# Patient Record
Sex: MALE | Race: WHITE | NOT HISPANIC OR LATINO | Employment: OTHER | URBAN - METROPOLITAN AREA
[De-identification: names, ages, dates, MRNs, and addresses within clinical notes are randomized per-mention and may not be internally consistent; named-entity substitution may affect disease eponyms.]

---

## 2017-02-06 ENCOUNTER — GENERIC CONVERSION - ENCOUNTER (OUTPATIENT)
Dept: OTHER | Facility: OTHER | Age: 72
End: 2017-02-06

## 2017-03-13 ENCOUNTER — GENERIC CONVERSION - ENCOUNTER (OUTPATIENT)
Dept: OTHER | Facility: OTHER | Age: 72
End: 2017-03-13

## 2018-01-05 ENCOUNTER — ALLSCRIPTS OFFICE VISIT (OUTPATIENT)
Dept: OTHER | Facility: OTHER | Age: 73
End: 2018-01-05

## 2018-01-05 DIAGNOSIS — Z12.5 ENCOUNTER FOR SCREENING FOR MALIGNANT NEOPLASM OF PROSTATE: ICD-10-CM

## 2018-01-05 DIAGNOSIS — I25.10 ATHEROSCLEROTIC HEART DISEASE OF NATIVE CORONARY ARTERY WITHOUT ANGINA PECTORIS: ICD-10-CM

## 2018-01-05 DIAGNOSIS — E78.5 HYPERLIPIDEMIA: ICD-10-CM

## 2018-01-06 NOTE — PROGRESS NOTES
Assessment   1  Current every day smoker (305 1) (F17 200)  2  Tobacco use (305 1) (Z72 0)  3  ASCVD (arteriosclerotic cardiovascular disease) (429 2,440 9) (I25 10)  4  Inguinal hernia, right (550 90) (K40 90)  5  Special screening examination for neoplasm of prostate (V76 44) (Z12 5)  6  Hyperlipidemia (272 4) (E78 5)  7  Osteopenia (733 90) (M85 80)  8  At risk for bone density loss (V49 89) (Z91 89)  9  Peripheral arterial disease (443 9) (I73 9)    Plan   ASCVD (arteriosclerotic cardiovascular disease)    · 1 - Aliya Richardson DO  Cardiology Co-Management  *  Status: Active  Requested for:    92MFA7132  () Care Summary provided  : Yes   · (1) CBC/PLT/DIFF; Status:Active; Requested ORJ:[id number]; Health Maintenance    · Stop: Clopidogrel Bisulfate 75 MG Oral Tablet (Plavix)  Hyperlipidemia    · (1) COMPREHENSIVE METABOLIC PANEL; Status:Active; Requested FUB:[id number];    · (1) LIPID PANEL, FASTING; Status:Active; Requested QYT:[id number]; Inguinal hernia, right    · 1 Sheryl Saunders MD, Mamadou Boothe  (General Surgery) Co-Management  *  Status: Active  Requested    for: 87BZQ4179  () Care Summary provided  : Yes  SocHx: Tobacco use    · We recommend you quit smoking  Time spent counseling today was greater than 3    minutes ; Status:Complete - Retrospective By Protocol Authorization;   Done: 99FEV1388  Special screening examination for neoplasm of prostate    · (1) PSA (SCREEN) (Dx V76 44 Screen for Prostate Cancer); Status:Active; Requested    PQX:[id number]; Discussion/Summary      Patient presents today for an inguinal bulge which appears to be a right inguinal hernia and I am going to refer him to surgery  Fortunately, he is not having any pain and this can be done electively  am going to send him to Dr Rachel Ayers for his history of coronary disease as he has had multiple different cardiologist at his current group  is due for some blood work which was ordered  This should be done in a fasting state      he has a history of osteopenia and I will consider repeating a DEXA scan this summer  I will have him follow up for a Medicare wellness visit in the next 4 months or so  regards to his peripheral arterial disease, I did encourage him to quit smoking  Fortunately he is not having any significant claudication at this time and I would encourage ongoing exercise  Chief Complaint   c/o lump on lower right quadrant with Flu shot      History of Present Illness   HPI: Patient presents today for chief complaint of a bulge in his right groin  This has been present for at least 2-3 weeks  He has a history of left hernia repair  exertional chest pain or shortness of breath  He does have known peripheral arterial disease but is having no current claudication  Unfortunately, he does continue to smoke but has cut back to 8 cigarettes per day  Review of Systems        Constitutional: no fever,-- not feeling poorly,-- no chills-- and-- not feeling tired  Cardiovascular: the heart rate was not slow,-- no chest pain,-- the heart rate was not fast-- and-- no palpitations  Respiratory: no shortness of breath,-- no cough,-- no wheezing-- and-- no shortness of breath during exertion  Gastrointestinal: no abdominal pain,-- no nausea,-- no vomiting,-- no constipation-- and-- no diarrhea  Genitourinary: urinary hesitancy, but-- no dysuria-- and-- no nocturia  Active Problems   1  ASCVD (arteriosclerotic cardiovascular disease) (429 2,440 9) (I25 10)  2  At risk for bone density loss (V49 89) (Z91 89)  3  Current every day smoker (305 1) (F17 200)  4  Hearing loss (389 9) (H91 90)  5  Heme + stool (792 1) (R19 5)  6  Hyperlipidemia (272 4) (E78 5)  7  Lower back pain (724 2) (M54 5)  8  Medicare annual wellness visit, initial (V70 0) (Z00 00)  9  Myalgia (729 1) (M79 1)  10  Osteopenia (733 90) (M85 80)  11   Polyp of sigmoid colon (211 3) (D12 5)  12  Special screening examination for neoplasm of prostate (V76 44) (Z12 5)  13  Tobacco use (305 1) (Z72 0)    Past Medical History   1  History of Acute URI (465 9) (J06 9)  2  History of Ecchymoses, spontaneous (782 7) (R23 3)  3  History of acute bronchitis (V12 69) (Z87 09)  4  History of hypertension (V12 59) (Z86 79)  5  History of intermittent claudication (V12 50) (Z86 79)  6  History of Influenza vaccination administered during current admission (V04 81) (Z23)  7  History of Need for pneumococcal vaccination (V03 82) (Z23)  8  History of Trochanteric bursitis, unspecified laterality (726 5) (M70 60)  Active Problems And Past Medical History Reviewed: The active problems and past medical history were reviewed and updated today  Family History   Mother   1  Family history of Diabetes Mellitus (V18 0)  Father   2  Family history of Reported Previous Cardiac Problems  Sister   3  Family history of COPD, severe  Family History Reviewed: The family history was reviewed and updated today  Social History    · Being A Social Drinker   · Current every day smoker (305 1) (F17 200)   · Tobacco use (305 1) (Z72 0)  The social history was reviewed and updated today  Surgical History   1  History of Cardiac Cath Procedure Outcome:  2  History of Hernia Repair  Surgical History Reviewed: The surgical history was reviewed and updated today  Current Meds   1  Atorvastatin Calcium 80 MG Oral Tablet; take one tablet by mouth one time daily; Therapy: 16Hmb6252 to (Evaluate:12Nov2017)  Requested for: 65CEZ2388; Last     Rx:[id number] Ordered  2  Baby Aspirin 81 MG CHEW; CHEW AND SWALLOW 1 TABLET DAILY; Therapy: (Recorded:46Rxe7709) to Recorded  3  Calcium TABS; Take 1 tablet daily; Therapy: (Recorded:86Zzg3467) to Recorded  4  Clopidogrel Bisulfate 75 MG Oral Tablet; Take 1 tablet daily  Requested for: 94Jzl9842; Last Rx:11Sim9810 Ordered  5   Metoprolol Succinate  MG Oral Tablet Extended Release 24 Hour; take 1 tablet     by mouth every day; Therapy: 32Ssz6748 to (Milind Downey)  Requested for: 44Emn1088; Last     Rx:31Rup9054 Ordered  6  Vitamin D TABS; Take 1 tablet daily; Therapy: (Recorded:23Pqr5052) to Recorded     The medication list was reviewed and updated today  Allergies   1  Chantix TABS    Vitals    Recorded: 52IAB1429 08:03AM   Heart Rate 80   Respiration 16   Systolic 203   Diastolic 84   Height 5 ft 7 5 in   Weight 162 lb    BMI Calculated 25   BSA Calculated 1 86   O2 Saturation 92, RA     Physical Exam        Constitutional      General appearance: No acute distress, well appearing and well nourished  Ears, Nose, Mouth, and Throat      Oropharynx: Normal with no erythema, edema, exudate or lesions  Pulmonary      Respiratory effort: No increased work of breathing or signs of respiratory distress  Auscultation of lungs: Clear to auscultation, equal breath sounds bilaterally, no wheezes, no rales, no rhonci  Cardiovascular      Auscultation of heart: Normal rate and rhythm, normal S1 and S2, without murmurs  Examination of extremities for edema and/or varicosities: Normal        Carotid pulses: Normal        Abdomen      Abdomen: Non-tender, no masses  Liver and spleen: No hepatomegaly or splenomegaly  Lymphatic      Palpation of lymph nodes in neck: No lymphadenopathy  Musculoskeletal      Gait and station: Normal        Neurologic      Cranial nerves: Cranial nerves 2-12 intact         Psychiatric      Orientation to person, place and time: Normal        Mood and affect: Normal           Signatures    Electronically signed by : DAYLIN Cook ; Jan 5 2018  9:15PM EST                       (Author)

## 2018-01-17 ENCOUNTER — ALLSCRIPTS OFFICE VISIT (OUTPATIENT)
Dept: OTHER | Facility: OTHER | Age: 73
End: 2018-01-17

## 2018-01-18 NOTE — CONSULTS
Assessment   1  Inguinal hernia, right (550 90) (K40 90)   2  ASCVD (arteriosclerotic cardiovascular disease) (429 2,440 9) (I25 10)    Discussion/Summary   Discussion Summary:    Jaleesa Rivas is a 71year old male who presents today, per referral by Dr Leslie Casillas, for consultation regarding a possible right inguinal hernia  Physical exam revealed a reducible right inguinal hernia  Discussed risks, benefits, and alternatives to laparoscopic right inguinal hernia repair with mesh  Explained the procedure as well as pre- and post-operative protocol and restrictions  Lifting and activity restrictions will remain in place for at least one month after surgery  Encouraged him to walk for exercise to aid his recovery  He will schedule surgery for his earliest convenience after meeting with his new cardiologist for clearance  He knows to contact our office if any concerns or problems arise  cessation- Encouraged him to quit smoking to reduce risk of hernia formation, poor healing after surgery, and to improve overall health  He understands that his smoking increases his risk of recurrence  Goals and Barriers: The patient has the current Goals: Schedule surgery  Smoking cessation  The patent has the current Barriers: Nicotine addiction  Patient's Capacity to Self-Care: Patient is able to Self-Care  Patient Education: Educational resources provided:      Chief Complaint   Chief Complaint Free Text Note Form: Right inguinal hernia  New patient referred by his PCP, Zenon Martinez, whom he saw and was evaluated for a right inguinal bulge, right inguinal hernia  Patient is presently asymptomatic  No n/v/f/c  Urinating and moving bowels normally  History of left inguinal hernia repair in the past  Medical history of heart disease  Had been on Plavix  Now on aspirin 81mg daily  Smokes about 8 cigarettes a day        History of Present Illness   HPI: Jaleesa Rivas is a 71year old male who presents today, per referral by Dr Therese Werner, for consultation regarding a possible right inguinal hernia  He had a left inguinal hernia repaired 14-16 years ago  He noticed a lump on the right side about 2-3 months ago  He reports it bulges out at night  He denies nausea, vomiting, fever, or chills  Urinating and moving bowels normally  He took Aspirin 81 mg and Plavix, and now is only taking Aspirin  He is seeing a new cardiologist in a few weeks  He currently smokes about 8 cigarettes a day  Review of Systems   Complete-Male:      Constitutional: as noted in HPI,-- no fever-- and-- no chills  Eyes: No complaints of eye pain, no red eyes, no discharge from eyes, no itchy eyes  ENT: no complaints of earache, no hearing loss, no nosebleeds, no nasal discharge, no sore throat, no hoarseness  Cardiovascular: No complaints of slow heart rate, no fast heart rate, no chest pain, no palpitations, no leg claudication, no lower extremity  Respiratory: No complaints of shortness of breath, no wheezing, no cough, no SOB on exertion, no orthopnea or PND  Gastrointestinal: abdominal pain, but-- as noted in HPI,-- no nausea,-- no vomiting,-- no constipation,-- no diarrhea-- and-- no blood in stools  Genitourinary: No complaints of dysuria, no incontinence, no hesitancy, no nocturia, no genital lesion, no testicular pain  Musculoskeletal: No complaints of arthralgia, no myalgias, no joint swelling or stiffness, no limb pain or swelling  Integumentary: No complaints of skin rash or skin lesions, no itching, no skin wound, no dry skin  Neurological: No compliants of headache, no confusion, no convulsions, no numbness or tingling, no dizziness or fainting, no limb weakness, no difficulty walking  Psychiatric: Is not suicidal, no sleep disturbances, no anxiety or depression, no change in personality, no emotional problems        Endocrine: No complaints of proptosis, no hot flashes, no muscle weakness, no erectile dysfunction, no deepening of the voice, no feelings of weakness  Hematologic/Lymphatic: No complaints of swollen glands, no swollen glands in the neck, does not bleed easily, no easy bruising  ROS Reviewed:    ROS reviewed  Active Problems   1  ASCVD (arteriosclerotic cardiovascular disease) (429 2,440 9) (I25 10)   2  At risk for bone density loss (V49 89) (Z91 89)   3  Current every day smoker (305 1) (F17 200)   4  Hearing loss (389 9) (H91 90)   5  Heme + stool (792 1) (R19 5)   6  Hyperlipidemia (272 4) (E78 5)   7  Inguinal hernia, right (550 90) (K40 90)   8  Lower back pain (724 2) (M54 5)   9  Medicare annual wellness visit, initial (V70 0) (Z00 00)   10  Myalgia (729 1) (M79 1)   11  Need for vaccination with 13-polyvalent pneumococcal conjugate vaccine (V03 82) (Z23)   12  Osteopenia (733 90) (M85 80)   13  Peripheral arterial disease (443 9) (I73 9)   14  Polyp of sigmoid colon (211 3) (D12 5)   15  Special screening examination for neoplasm of prostate (V76 44) (Z12 5)   16  Tobacco use (305 1) (Z72 0)    Past Medical History   1  History of Acute URI (465 9) (J06 9)   2  History of Ecchymoses, spontaneous (782 7) (R23 3)   3  History of acute bronchitis (V12 69) (Z87 09)   4  History of hypertension (V12 59) (Z86 79)   5  History of intermittent claudication (V12 50) (Z86 79)   6  History of Influenza vaccination administered during current admission (V04 81) (Z23)   7  History of Need for pneumococcal vaccination (V03 82) (Z23)   8  History of Trochanteric bursitis, unspecified laterality (726 5) (M70 60)  Active Problems And Past Medical History Reviewed: The active problems and past medical history were reviewed and updated today  Surgical History   1  History of Cardiac Cath Procedure Outcome:   2  History of Hernia Repair  Surgical History Reviewed: The surgical history was reviewed and updated today  Family History   Mother    1   Family history of Diabetes Mellitus (V18 0)  Father    2  Family history of Reported Previous Cardiac Problems  Sister    3  Family history of COPD, severe  Family History Reviewed: The family history was reviewed and updated today  Social History    · Being A Social Drinker   · Current every day smoker (305 1) (F17 200)   · Tobacco use (305 1) (Z72 0)  Social History Reviewed: The social history was reviewed and updated today  The social history was reviewed and is unchanged  Current Meds    1  Atorvastatin Calcium 80 MG Oral Tablet; take one tablet by mouth one time daily; Therapy: 27Klw5820 to (Evaluate:12Nov2017)  Requested for: 19KDX1450; Last     Rx:30Qzr5764 Ordered   2  Baby Aspirin 81 MG CHEW; CHEW AND SWALLOW 1 TABLET DAILY; Therapy: (Recorded:17Jan2018) to Recorded   3  Calcium TABS; Take 1 tablet daily; Therapy: (Recorded:81Ppb2027) to Recorded   4  Metoprolol Succinate  MG Oral Tablet Extended Release 24 Hour; take 1 tablet by     mouth every day; Therapy: 80Ucf1392 to (Marilyn Ramirez)  Requested for: 65Zak0740; Last     Rx:68Bdz1844 Ordered   5  Vitamin D TABS; Take 1 tablet daily; Therapy: (Recorded:36Ess3535) to Recorded  Medication List Reviewed: The medication list was reviewed and updated today  Allergies   1  Chantix TABS    Vitals   Vital Signs    Recorded: 86ULD3277 09:54AM   Temperature 97 F, Tympanic   Heart Rate 76, R Radial   Pulse Quality Normal, R Radial   Respiration Quality Normal   Respiration 16   Systolic 823, RUE, Sitting   Diastolic 82, RUE, Sitting   Height 5 ft 7 5 in   Weight 162 lb    BMI Calculated 25   BSA Calculated 1 86     Physical Exam        Constitutional      General appearance: No acute distress, well appearing and well nourished  Eyes      Conjunctiva and lids: No swelling, erythema, or discharge  Pupils and irises: Equal, round and reactive to light  Sclera non-icteric         Ears, Nose, Mouth, and Throat External inspection of ears and nose: Normal        Neck      Supple, symmetric, trachea midline, no masses      Pulmonary      Respiratory effort: No increased work of breathing or signs of respiratory distress  Auscultation of lungs: Clear to auscultation, equal breath sounds bilaterally, no wheezes, no rales, no rhonci  Cardiovascular      Auscultation of heart: Normal rate and rhythm, normal S1 and S2, without murmurs  Examination of extremities for edema and/or varicosities: Normal        Carotid pulses: Normal        Abdomen      Abdomen: Abnormal  -- Reducible right inguinal hernia  Liver and spleen: No hepatomegaly or splenomegaly  Lymphatic      Palpation of lymph nodes in neck: No lymphadenopathy  Musculoskeletal      Digits and nails: Normal without clubbing or cyanosis  Extremities: No clubbing, no cyanosis, no edema      Skin      Skin and subcutaneous tissue: Normal without rashes or lesions  Neurologic      Sensation: Motor and sensory grossly intact  Psychiatric      Orientation to person, place and time: Normal        Mood and affect: Normal        Attending Note   Scribe Attestation:      Scribe Attestation Zion RENDON am acting as a scribe in the presence of the attending physician while the attending physician examines the patient  Physician Attestation:      Moose Pena personally performed the services described in this documentation as scribed in my presence, and it is both accurate and complete        Future Appointments      Date/Time Provider Specialty Site   01/29/2018 08:00 AM Krys Ramires DO Cardiology  CARDIOLOGY  [facility]     Signatures    Electronically signed by : Uzair Moser MD; Jan 17 2018  3:12PM EST                       (Author)

## 2018-01-22 VITALS
HEIGHT: 68 IN | TEMPERATURE: 97 F | DIASTOLIC BLOOD PRESSURE: 82 MMHG | WEIGHT: 162 LBS | BODY MASS INDEX: 24.55 KG/M2 | SYSTOLIC BLOOD PRESSURE: 140 MMHG | RESPIRATION RATE: 16 BRPM | HEART RATE: 76 BPM

## 2018-01-23 VITALS
RESPIRATION RATE: 16 BRPM | BODY MASS INDEX: 24.55 KG/M2 | DIASTOLIC BLOOD PRESSURE: 84 MMHG | WEIGHT: 162 LBS | SYSTOLIC BLOOD PRESSURE: 120 MMHG | HEIGHT: 68 IN | OXYGEN SATURATION: 92 % | HEART RATE: 80 BPM

## 2018-01-24 ENCOUNTER — TRANSCRIBE ORDERS (OUTPATIENT)
Dept: ADMINISTRATIVE | Facility: HOSPITAL | Age: 73
End: 2018-01-24

## 2018-01-24 ENCOUNTER — TELEPHONE (OUTPATIENT)
Dept: FAMILY MEDICINE CLINIC | Facility: CLINIC | Age: 73
End: 2018-01-24

## 2018-01-24 ENCOUNTER — ANESTHESIA EVENT (OUTPATIENT)
Dept: PERIOP | Facility: HOSPITAL | Age: 73
End: 2018-01-24
Payer: MEDICARE

## 2018-01-24 ENCOUNTER — APPOINTMENT (OUTPATIENT)
Dept: LAB | Facility: HOSPITAL | Age: 73
End: 2018-01-24
Payer: MEDICARE

## 2018-01-24 ENCOUNTER — HOSPITAL ENCOUNTER (OUTPATIENT)
Dept: NON INVASIVE DIAGNOSTICS | Facility: HOSPITAL | Age: 73
Discharge: HOME | End: 2018-01-24
Attending: SURGERY
Payer: MEDICARE

## 2018-01-24 ENCOUNTER — APPOINTMENT (OUTPATIENT)
Dept: LAB | Facility: HOSPITAL | Age: 73
End: 2018-01-24
Attending: SURGERY
Payer: MEDICARE

## 2018-01-24 ENCOUNTER — APPOINTMENT (OUTPATIENT)
Dept: PREADMISSION TESTING | Facility: HOSPITAL | Age: 73
End: 2018-01-24
Payer: MEDICARE

## 2018-01-24 DIAGNOSIS — E78.5 HYPERLIPIDEMIA: ICD-10-CM

## 2018-01-24 DIAGNOSIS — K40.90 INGUINAL HERNIA WITHOUT OBSTRUCTION OR GANGRENE, RECURRENCE NOT SPECIFIED, UNSPECIFIED LATERALITY: ICD-10-CM

## 2018-01-24 DIAGNOSIS — Z12.5 ENCOUNTER FOR SCREENING FOR MALIGNANT NEOPLASM OF PROSTATE: ICD-10-CM

## 2018-01-24 DIAGNOSIS — Z01.818 PREOP EXAMINATION: Primary | ICD-10-CM

## 2018-01-24 DIAGNOSIS — Z01.818 PREOP EXAMINATION: ICD-10-CM

## 2018-01-24 DIAGNOSIS — I25.10 ATHEROSCLEROTIC HEART DISEASE OF NATIVE CORONARY ARTERY WITHOUT ANGINA PECTORIS: ICD-10-CM

## 2018-01-24 LAB
ALBUMIN SERPL BCP-MCNC: 3.8 G/DL (ref 3.5–5)
ALP SERPL-CCNC: 82 U/L (ref 46–116)
ALT SERPL W P-5'-P-CCNC: 25 U/L (ref 12–78)
ANION GAP SERPL CALCULATED.3IONS-SCNC: 9 MMOL/L (ref 4–13)
AST SERPL W P-5'-P-CCNC: 20 U/L (ref 5–45)
ATRIAL RATE: 75 BPM
BASOPHILS # BLD AUTO: 0.04 THOUSANDS/ΜL (ref 0–0.1)
BASOPHILS NFR BLD AUTO: 0 % (ref 0–1)
BILIRUB SERPL-MCNC: 0.79 MG/DL (ref 0.2–1)
BUN SERPL-MCNC: 13 MG/DL (ref 5–25)
CALCIUM SERPL-MCNC: 8.4 MG/DL (ref 8.3–10.1)
CHLORIDE SERPL-SCNC: 104 MMOL/L (ref 100–108)
CHOLEST SERPL-MCNC: 194 MG/DL (ref 50–200)
CO2 SERPL-SCNC: 26 MMOL/L (ref 21–32)
CREAT SERPL-MCNC: 0.85 MG/DL (ref 0.6–1.3)
EOSINOPHIL # BLD AUTO: 0.15 THOUSAND/ΜL (ref 0–0.61)
EOSINOPHIL NFR BLD AUTO: 1 % (ref 0–6)
ERYTHROCYTE [DISTWIDTH] IN BLOOD BY AUTOMATED COUNT: 13.8 % (ref 11.6–15.1)
GFR SERPL CREATININE-BSD FRML MDRD: 88 ML/MIN/1.73SQ M
GLUCOSE P FAST SERPL-MCNC: 103 MG/DL (ref 65–99)
HCT VFR BLD AUTO: 51.8 % (ref 36.5–49.3)
HDLC SERPL-MCNC: 55 MG/DL (ref 40–60)
HGB BLD-MCNC: 17.9 G/DL (ref 12–17)
LDLC SERPL CALC-MCNC: 121 MG/DL (ref 0–100)
LYMPHOCYTES # BLD AUTO: 3.76 THOUSANDS/ΜL (ref 0.6–4.47)
LYMPHOCYTES NFR BLD AUTO: 28 % (ref 14–44)
MCH RBC QN AUTO: 34.4 PG (ref 26.8–34.3)
MCHC RBC AUTO-ENTMCNC: 34.6 G/DL (ref 31.4–37.4)
MCV RBC AUTO: 99 FL (ref 82–98)
MONOCYTES # BLD AUTO: 1.32 THOUSAND/ΜL (ref 0.17–1.22)
MONOCYTES NFR BLD AUTO: 10 % (ref 4–12)
NEUTROPHILS # BLD AUTO: 8.08 THOUSANDS/ΜL (ref 1.85–7.62)
NEUTS SEG NFR BLD AUTO: 61 % (ref 43–75)
P AXIS: 57 DEGREES
PLATELET # BLD AUTO: 274 THOUSANDS/UL (ref 149–390)
PMV BLD AUTO: 10.3 FL (ref 8.9–12.7)
POTASSIUM SERPL-SCNC: 4.3 MMOL/L (ref 3.5–5.3)
PR INTERVAL: 174 MS
PROT SERPL-MCNC: 7.1 G/DL (ref 6.4–8.2)
PSA SERPL-MCNC: 3.5 NG/ML (ref 0–4)
QRS AXIS: -66 DEGREES
QRSD INTERVAL: 94 MS
QT INTERVAL: 392 MS
QTC INTERVAL: 437 MS
RBC # BLD AUTO: 5.21 MILLION/UL (ref 3.88–5.62)
SODIUM SERPL-SCNC: 139 MMOL/L (ref 136–145)
T WAVE AXIS: 27 DEGREES
TRIGL SERPL-MCNC: 91 MG/DL
VENTRICULAR RATE: 75 BPM
WBC # BLD AUTO: 13.35 THOUSAND/UL (ref 4.31–10.16)

## 2018-01-24 PROCEDURE — 36415 COLL VENOUS BLD VENIPUNCTURE: CPT

## 2018-01-24 PROCEDURE — G0103 PSA SCREENING: HCPCS

## 2018-01-24 PROCEDURE — 80053 COMPREHEN METABOLIC PANEL: CPT

## 2018-01-24 PROCEDURE — 93005 ELECTROCARDIOGRAM TRACING: CPT

## 2018-01-24 PROCEDURE — 85025 COMPLETE CBC W/AUTO DIFF WBC: CPT

## 2018-01-24 PROCEDURE — 80061 LIPID PANEL: CPT

## 2018-01-24 RX ORDER — MELATONIN
1000 DAILY
COMMUNITY
End: 2020-07-20 | Stop reason: ALTCHOICE

## 2018-01-24 RX ORDER — SODIUM CHLORIDE 9 MG/ML
125 INJECTION, SOLUTION INTRAVENOUS CONTINUOUS
Status: CANCELLED | OUTPATIENT
Start: 2018-02-08

## 2018-01-24 RX ORDER — PHENOL 1.4 %
600 AEROSOL, SPRAY (ML) MUCOUS MEMBRANE DAILY
COMMUNITY
End: 2020-07-20 | Stop reason: ALTCHOICE

## 2018-01-24 RX ORDER — ATORVASTATIN CALCIUM 80 MG/1
80 TABLET, FILM COATED ORAL DAILY
COMMUNITY
End: 2018-06-27 | Stop reason: SDUPTHER

## 2018-01-24 RX ORDER — ASPIRIN 81 MG/1
81 TABLET, CHEWABLE ORAL DAILY
COMMUNITY

## 2018-01-24 RX ORDER — METOPROLOL SUCCINATE 100 MG/1
100 TABLET, EXTENDED RELEASE ORAL DAILY
COMMUNITY
End: 2018-06-27 | Stop reason: SDUPTHER

## 2018-01-24 NOTE — ANESTHESIA PREPROCEDURE EVALUATION
Review of Systems/Medical History  Patient summary reviewed  Chart reviewed      Cardiovascular  Exercise tolerance: poor,  Hyperlipidemia, Hypertension controlled, Past MI > 6 months, CAD, ,    Pulmonary  Smoker cigar smoker  , Tobacco cessation counseling given Cumulative Pack Years: 21, Pneumonia, COPD mild- PRN medicaiton ,   Comment: Did not smoke yesterday or today      GI/Hepatic    GERD well controlled,        Negative  ROS        Endo/Other  Negative endo/other ROS      GYN  Negative gynecology ROS          Hematology  Negative hematology ROS      Musculoskeletal  Negative musculoskeletal ROS        Neurology  Negative neurology ROS   Neuromuscular disease ,    Psychology   Negative psychology ROS              Physical Exam    Airway    Mallampati score: II  TM Distance: <3 FB  Neck ROM: full     Dental   lower dentures and upper dentures,     Cardiovascular  Rhythm: regular, Rate: normal,     Pulmonary  Breath sounds clear to auscultation,     Other Findings        Anesthesia Plan  ASA Score- 3     Anesthesia Type- general with ASA Monitors  Additional Monitors:   Airway Plan: ETT  Plan Factors- Patient instructed to abstain from smoking on day of procedure  Patient did not smoke on day of surgery  Induction- intravenous  Postoperative Plan- Plan for postoperative opioid use  Planned trial extubation    Informed Consent- Anesthetic plan and risks discussed with patient  I personally reviewed this patient with the CRNA  Discussed and agreed on the Anesthesia Plan with the CRNA  Dara Soulier

## 2018-01-24 NOTE — TELEPHONE ENCOUNTER
----- Message from Teri Bond MD sent at 1/24/2018 12:18 PM EST -----  Call patient  Labs [id number] Taryn Coles  Continue current therapy

## 2018-01-24 NOTE — ANESTHESIA PREPROCEDURE EVALUATION
Review of Systems/Medical History          Cardiovascular  CAD, ,    Pulmonary       GI/Hepatic            Endo/Other     GYN       Hematology   Musculoskeletal       Neurology   Psychology

## 2018-01-24 NOTE — PRE-PROCEDURE INSTRUCTIONS
Pre-Surgery Instructions:   Medication Instructions    aspirin 81 mg chewable tablet Patient was instructed by Physician and understands   atorvastatin (LIPITOR) 80 mg tablet Patient was instructed by Physician and understands   calcium carbonate (CALCIUM 600) 600 MG tablet Patient was instructed by Physician and understands   cholecalciferol (VITAMIN D3) 1,000 units tablet Patient was instructed by Physician and understands   metoprolol succinate (TOPROL-XL) 100 mg 24 hr tablet Patient was instructed by Physician and understands  Patient was seen by Dr Kira Reeves and was instructed to take metoprolol am of surgery with a sip of water  Patient was instructed to avoid NSAIDS, Aspirin, Vitamins, and supplements 7 days prior to surgery  [facility] pre-op instructions reviewed  Pre-op bathing reviewed and patient was given chlorhexidine soap

## 2018-01-29 ENCOUNTER — OFFICE VISIT (OUTPATIENT)
Dept: CARDIOLOGY CLINIC | Facility: CLINIC | Age: 73
End: 2018-01-29
Payer: MEDICARE

## 2018-01-29 VITALS
WEIGHT: 165.2 LBS | HEART RATE: 81 BPM | BODY MASS INDEX: 25.04 KG/M2 | SYSTOLIC BLOOD PRESSURE: 140 MMHG | DIASTOLIC BLOOD PRESSURE: 80 MMHG | HEIGHT: 68 IN

## 2018-01-29 DIAGNOSIS — E78.5 DYSLIPIDEMIA: ICD-10-CM

## 2018-01-29 DIAGNOSIS — I25.10 CORONARY ARTERY DISEASE INVOLVING NATIVE CORONARY ARTERY OF NATIVE HEART WITHOUT ANGINA PECTORIS: Primary | ICD-10-CM

## 2018-01-29 DIAGNOSIS — I73.9 PERIPHERAL ARTERIAL DISEASE (HCC): ICD-10-CM

## 2018-01-29 DIAGNOSIS — I10 BENIGN ESSENTIAL HTN: ICD-10-CM

## 2018-01-29 PROCEDURE — 99204 OFFICE O/P NEW MOD 45 MIN: CPT | Performed by: INTERNAL MEDICINE

## 2018-01-29 RX ORDER — INFLUENZA A VIRUSA/MICHIGAN/45/2015 X-275 (H1N1) ANTIGEN (FORMALDEHYDE INACTIVATED), INFLUENZA A VIRUS A/HONG KONG/4801/2014 X-263B (H3N2) ANTIGEN (FORMALDEHYDE INACTIVATED), AND INFLUENZA B VIRUS B/BRISBANE/60/2008 ANTIGEN (FORMALDEHYDE INACTIVATED) 60; 60; 60 UG/.5ML; UG/.5ML; UG/.5ML
INJECTION, SUSPENSION INTRAMUSCULAR
Refills: 0 | Status: ON HOLD | COMMUNITY
Start: 2017-11-02 | End: 2018-02-08 | Stop reason: ALTCHOICE

## 2018-01-29 NOTE — PROGRESS NOTES
Cardiology Follow Up        Laurent Lorenz      1945      [id number]    SEE PRIOR FULL CONSULT      Vitals:  Vitals:    01/29/18 0758   BP: 140/80   BP Location: Right arm   Patient Position: Sitting   Cuff Size: Large   Pulse: 81   Weight: 74 9 kg (165 lb 3 2 oz)   Height: 5' 8" (1 727 m)         Past Medical History:   Diagnosis Date    Coronary artery disease     Full dentures     upper and lower    GERD (gastroesophageal reflux disease)     Hyperlipidemia     Hypertension     Inguinal hernia     right side    Myocardial infarction     Pneumonia     Sciatica     Wears glasses      Social History     Social History    Marital status: /Civil Union     Spouse name: N/A    Number of children: N/A    Years of education: N/A     Occupational History    Not on file  Social History Main Topics    Smoking status: Current Every Day Smoker     Packs/day: 0 50     Years: 40 00    Smokeless tobacco: Never Used    Alcohol use 6 0 oz/week     10 Cans of beer per week    Drug use: No    Sexual activity: Not on file     Other Topics Concern    Not on file     Social History Narrative    No narrative on file      No family history on file    Past Surgical History:   Procedure Laterality Date    APPENDECTOMY      CORONARY ANGIOPLASTY WITH STENT PLACEMENT  05/2013    x1    HERNIA REPAIR Left     inguinal hernia     TONSILLECTOMY      WISDOM TOOTH EXTRACTION         Current Outpatient Prescriptions:     aspirin 81 mg chewable tablet, Chew 81 mg daily, Disp: , Rfl:     atorvastatin (LIPITOR) 80 mg tablet, Take 80 mg by mouth daily, Disp: , Rfl:     calcium carbonate (CALCIUM 600) 600 MG tablet, Take 600 mg by mouth daily, Disp: , Rfl:     cholecalciferol (VITAMIN D3) 1,000 units tablet, Take 1,000 Units by mouth daily, Disp: , Rfl:     metoprolol succinate (TOPROL-XL) 100 mg 24 hr tablet, Take 100 mg by mouth daily, Disp: , Rfl:     FLUZONE HIGH-DOSE 0 5 ML ROXANN, TO BE ADMINISTERED BY PHARMACIST FOR IMMUNIZATION, Disp: , Rfl: 0    Labs:not applicable      Review of Systems:  Review of Systems   Respiratory: Negative  Cardiovascular: Negative  All other systems reviewed and are negative  Physical Exam:  Physical Exam   Constitutional: He is oriented to person, place, and time  He appears well-developed and well-nourished  No distress  HENT:   Head: Normocephalic and atraumatic  Eyes: EOM are normal  Pupils are equal, round, and reactive to light  Right eye exhibits no discharge  No scleral icterus  Neck: Normal range of motion  Neck supple  No thyromegaly present  Cardiovascular: Normal rate, regular rhythm and normal heart sounds  Exam reveals no gallop and no friction rub  No murmur heard  Pulmonary/Chest: Effort normal and breath sounds normal    Abdominal: He exhibits no distension  There is no tenderness  There is no rebound and no guarding  Musculoskeletal: Normal range of motion  He exhibits no edema  Neurological: He is alert and oriented to person, place, and time  Coordination normal    Skin: Skin is warm and dry  No rash noted  He is not diaphoretic  No erythema  No pallor  Psychiatric: He has a normal mood and affect   His behavior is normal  Judgment and thought content normal

## 2018-01-29 NOTE — PROGRESS NOTES
Cardiology Initial Consultation        Guille Guerrero      1945      [id number]      Discussion/Summary:    1  CAD status post PCI/ARIK left circumflex artery 2013, setting of non STEMI:  Doing well, no symptoms of angina, patient remains active and compliant with aspirin, beta-blocker and statin  He states clopidogrel has been discontinued in the past   Patient given permission to hold aspirin 5-7 days before surgery if needed  Continue beta-blocker and statin medication  Lipid panel has been ordered on 01/24/2018, which will be followed up next visit  Echocardiogram after next visit in 3 months  2  Dyslipidemia:  Continue high-dose statin, follow up on lipid panel  3  Hypertension:  Well controlled, continue metoprolol  4  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%, prior Doppler 2015: Will follow-up Dopplers later this year, and consider vascular surgery evaluation if symptoms developed  5  Preoperative risk stratification:  Suspect patient at intermediate cardiovascular risk for hernia surgery in light of history of peripheral arterial disease and coronary artery disease  Do not recommend ischemic evaluation preoperatively, as he is asymptomatic with a fairly good functional capacity  Will check echo with next visit  Patient given permission to hold aspirin if needed preoperatively  Continue beta-blocker and statin perioperatively as able  Preoperative ECG reviewed from 01/24/2018 with sinus rhythm, left anterior fascicular block, probable left atrial abnormality with poor R-wave progression and nonspecific T-wave abnormality  Interval History: This is a very pleasant 67-year-old male with a significant history of CAD, status post PCI/ARIK to the mid circumflex in 2013, in the setting of non STEMI, to unknown coronary artery, as well as a history of hypertension and dyslipidemia who presents today to establish cardiovascular care with our practice    He states that have been many changes at the [address] [place] [address] group which he is not happy with, and has chosen to establish his care with us  His prior nuclear stress test June 2013, post left circumflex stenting revealed mid to distal inferolateral scar with mild heath-infarct ischemia  Ejection fraction has been within normal limits, per [facility] notes  Prior lower extremity arterial Dopplers 2015 have revealed greater than 75% stenosis right distal SFA, with BRAD, with prior aortoiliac duplex in 2013 unremarkable  From a symptomatic standpoint he feels very well without exertional chest pain, shortness of breath, lightheadedness, palpitations, lower extremity edema, orthopnea  He states clopidogrel has been discontinued in the past, and he continues only on a baby aspirin once a day, metoprolol, and atorvastatin  He denies any adverse drug reactions to any cardiac medications, and denies any recent ischemic evaluation  He is scheduled for hernia surgery in the near future  Vitals:  Vitals:    01/29/18 0758   BP: 140/80   BP Location: Right arm   Patient Position: Sitting   Cuff Size: Large   Pulse: 81   Weight: 74 9 kg (165 lb 3 2 oz)   Height: 5' 8" (1 727 m)         Past Medical History:   Diagnosis Date    Coronary artery disease     Full dentures     upper and lower    GERD (gastroesophageal reflux disease)     Hyperlipidemia     Hypertension     Inguinal hernia     right side    Myocardial infarction     Pneumonia     Sciatica     Wears glasses      Social History     Social History    Marital status: /Civil Union     Spouse name: N/A    Number of children: N/A    Years of education: N/A     Occupational History    Not on file       Social History Main Topics    Smoking status: Current Every Day Smoker     Packs/day: 0 50     Years: 40 00    Smokeless tobacco: Never Used    Alcohol use 6 0 oz/week     10 Cans of beer per week    Drug use: No    Sexual activity: Not on file     Other Topics Concern    Not on file     Social History Narrative    No narrative on file      No family history on file    Past Surgical History:   Procedure Laterality Date    APPENDECTOMY      CORONARY ANGIOPLASTY WITH STENT PLACEMENT  05/2013    x1    HERNIA REPAIR Left     inguinal hernia     TONSILLECTOMY      WISDOM TOOTH EXTRACTION         Current Outpatient Prescriptions:     aspirin 81 mg chewable tablet, Chew 81 mg daily, Disp: , Rfl:     atorvastatin (LIPITOR) 80 mg tablet, Take 80 mg by mouth daily, Disp: , Rfl:     calcium carbonate (CALCIUM 600) 600 MG tablet, Take 600 mg by mouth daily, Disp: , Rfl:     cholecalciferol (VITAMIN D3) 1,000 units tablet, Take 1,000 Units by mouth daily, Disp: , Rfl:     metoprolol succinate (TOPROL-XL) 100 mg 24 hr tablet, Take 100 mg by mouth daily, Disp: , Rfl:     FLUZONE HIGH-DOSE 0 5 ML ROXANN, TO BE ADMINISTERED BY PHARMACIST FOR IMMUNIZATION, Disp: , Rfl: 0    Labs:  Hospital Outpatient Visit on 01/24/2018   Component Date Value    Ventricular Rate 01/24/2018 75     Atrial Rate 01/24/2018 75     RI Interval 01/24/2018 174     QRSD Interval 01/24/2018 94     QT Interval 01/24/2018 392     QTC Interval 01/24/2018 437     P Axis 01/24/2018 57     QRS Axis 01/24/2018 -77     T Wave [place] 01/24/2018 27    Appointment on 01/24/2018   Component Date Value    Cholesterol 01/24/2018 194     Triglycerides 01/24/2018 91     HDL, Direct 01/24/2018 55     LDL Calculated 01/24/2018 121*    Sodium 01/24/2018 139     Potassium 01/24/2018 4 3     Chloride 01/24/2018 104     CO2 01/24/2018 26     Anion Gap 01/24/2018 9     BUN 01/24/2018 13     Creatinine 01/24/2018 0 85     Glucose, Fasting 01/24/2018 103*    Calcium 01/24/2018 8 4     AST 01/24/2018 20     ALT 01/24/2018 25     Alkaline Phosphatase 01/24/2018 82     Total Protein 01/24/2018 7 1     Albumin 01/24/2018 3 8     Total Bilirubin 01/24/2018 0 79     eGFR 01/24/2018 88     PSA 01/24/2018 3 5    Appointment on 01/24/2018   Component Date Value    WBC 01/24/2018 13 35*    RBC 01/24/2018 5 21     Hemoglobin 01/24/2018 17 9*    Hematocrit 01/24/2018 51 8*    MCV 01/24/2018 99*    MCH 01/24/2018 34 4*    MCHC 01/24/2018 34 6     RDW 01/24/2018 13 8     MPV 01/24/2018 10 3     Platelets 65/32/2860 274     Neutrophils Relative 01/24/2018 61     Lymphocytes Relative 01/24/2018 28     Monocytes Relative 01/24/2018 10     Eosinophils Relative 01/24/2018 1     Basophils Relative 01/24/2018 0     Neutrophils Absolute 01/24/2018 8 08*    Lymphocytes Absolute 01/24/2018 3 76     Monocytes Absolute 01/24/2018 1 32*    Eosinophils Absolute 01/24/2018 0 15     Basophils Absolute 01/24/2018 0 04          Review of Systems:  Review of Systems   Respiratory: Negative  Cardiovascular: Negative  All other systems reviewed and are negative  Physical Exam:  Physical Exam   Constitutional: He is oriented to person, place, and time  He appears well-developed and well-nourished  No distress  HENT:   Head: Normocephalic and atraumatic  Eyes: EOM are normal  Pupils are equal, round, and reactive to light  Right eye exhibits no discharge  No scleral icterus  Neck: Normal range of motion  Neck supple  No thyromegaly present  Cardiovascular: Normal rate, regular rhythm and normal heart sounds  Exam reveals no gallop and no friction rub  No murmur heard  Pulmonary/Chest: Effort normal and breath sounds normal    Abdominal: He exhibits no distension  There is no tenderness  There is no rebound and no guarding  Musculoskeletal: Normal range of motion  He exhibits no edema  Neurological: He is alert and oriented to person, place, and time  Coordination normal    Skin: Skin is warm and dry  No rash noted  He is not diaphoretic  No erythema  No pallor  Psychiatric: He has a normal mood and affect   His behavior is normal  Judgment and thought content normal

## 2018-01-29 NOTE — PROGRESS NOTES
Cardiology Consultation     Petra Ramirez  [id number]  1945      No diagnosis found  There is no problem list on file for this patient  Past Medical History:   Diagnosis Date    Coronary artery disease     Full dentures     upper and lower    GERD (gastroesophageal reflux disease)     Hyperlipidemia     Hypertension     Inguinal hernia     right side    Myocardial infarction     Pneumonia     Sciatica     Wears glasses      Social History     Social History    Marital status: /Civil Union     Spouse name: N/A    Number of children: N/A    Years of education: N/A     Occupational History    Not on file  Social History Main Topics    Smoking status: Current Every Day Smoker     Packs/day: 0 50     Years: 40 00    Smokeless tobacco: Never Used    Alcohol use 6 0 oz/week     10 Cans of beer per week    Drug use: No    Sexual activity: Not on file     Other Topics Concern    Not on file     Social History Narrative    No narrative on file      No family history on file    Past Surgical History:   Procedure Laterality Date    APPENDECTOMY      CORONARY ANGIOPLASTY WITH STENT PLACEMENT  05/2013    x1    HERNIA REPAIR Left     inguinal hernia     TONSILLECTOMY      WISDOM TOOTH EXTRACTION         Current Outpatient Prescriptions:     aspirin 81 mg chewable tablet, Chew 81 mg daily, Disp: , Rfl:     atorvastatin (LIPITOR) 80 mg tablet, Take 80 mg by mouth daily, Disp: , Rfl:     calcium carbonate (CALCIUM 600) 600 MG tablet, Take 600 mg by mouth daily, Disp: , Rfl:     cholecalciferol (VITAMIN D3) 1,000 units tablet, Take 1,000 Units by mouth daily, Disp: , Rfl:     metoprolol succinate (TOPROL-XL) 100 mg 24 hr tablet, Take 100 mg by mouth daily, Disp: , Rfl:     FLUZONE HIGH-DOSE 0 5 ML ROXANN, TO BE ADMINISTERED BY PHARMACIST FOR IMMUNIZATION, Disp: , Rfl: 0  No Known Allergies  Vitals:    01/29/18 0758   BP: 140/80   BP Location: Right arm   Patient Position: Sitting   Cuff Size: Large   Pulse: 81   Weight: 74 9 kg (165 lb 3 2 oz)   Height: 5' 8" (1 727 m)       Labs:  Hospital Outpatient Visit on 01/24/2018   Component Date Value    Ventricular Rate 01/24/2018 75     Atrial Rate 01/24/2018 75     ND Interval 01/24/2018 174     QRSD Interval 01/24/2018 94     QT Interval 01/24/2018 392     QTC Interval 01/24/2018 437     P Axis 01/24/2018 57     QRS Axis 01/24/2018 -66     T Wave [place] 01/24/2018 27    Appointment on 01/24/2018   Component Date Value    Cholesterol 01/24/2018 194     Triglycerides 01/24/2018 91     HDL, Direct 01/24/2018 55     LDL Calculated 01/24/2018 121*    Sodium 01/24/2018 139     Potassium 01/24/2018 4 3     Chloride 01/24/2018 104     CO2 01/24/2018 26     Anion Gap 01/24/2018 9     BUN 01/24/2018 13     Creatinine 01/24/2018 0 85     Glucose, Fasting 01/24/2018 103*    Calcium 01/24/2018 8 4     AST 01/24/2018 20     ALT 01/24/2018 25     Alkaline Phosphatase 01/24/2018 82     Total Protein 01/24/2018 7 1     Albumin 01/24/2018 3 8     Total Bilirubin 01/24/2018 0 79     eGFR 01/24/2018 88     PSA 01/24/2018 3 5    Appointment on 01/24/2018   Component Date Value    WBC 01/24/2018 13 35*    RBC 01/24/2018 5 21     Hemoglobin 01/24/2018 17 9*    Hematocrit 01/24/2018 51 8*    MCV 01/24/2018 99*    MCH 01/24/2018 34 4*    MCHC 01/24/2018 34 6     RDW 01/24/2018 13 8     MPV 01/24/2018 10 3     Platelets 23/90/9281 274     Neutrophils Relative 01/24/2018 61     Lymphocytes Relative 01/24/2018 28     Monocytes Relative 01/24/2018 10     Eosinophils Relative 01/24/2018 1     Basophils Relative 01/24/2018 0     Neutrophils Absolute 01/24/2018 8 08*    Lymphocytes Absolute 01/24/2018 3 76     Monocytes Absolute 01/24/2018 1 32*    Eosinophils Absolute 01/24/2018 0 15     Basophils Absolute 01/24/2018 0 04      Imaging: No results found      Review of Systems:  Review of Systems   Respiratory: Negative  Cardiovascular: Negative  All other systems reviewed and are negative  Physical Exam:  Physical Exam   Constitutional: He is oriented to person, place, and time  He appears well-developed and well-nourished  No distress  HENT:   Head: Normocephalic and atraumatic  Eyes: EOM are normal  Pupils are equal, round, and reactive to light  Right eye exhibits no discharge  No scleral icterus  Neck: Normal range of motion  Neck supple  No thyromegaly present  Cardiovascular: Normal rate, regular rhythm and normal heart sounds  Exam reveals no gallop and no friction rub  No murmur heard  Pulmonary/Chest: Effort normal and breath sounds normal    Abdominal: He exhibits no distension  There is no tenderness  There is no rebound and no guarding  Musculoskeletal: Normal range of motion  He exhibits no edema  Neurological: He is alert and oriented to person, place, and time  Coordination normal    Skin: Skin is warm and dry  No rash noted  He is not diaphoretic  No erythema  No pallor  Psychiatric: He has a normal mood and affect   His behavior is normal  Judgment and thought content normal        Discussion/Summary:see other full consult

## 2018-02-08 ENCOUNTER — HOSPITAL ENCOUNTER (OUTPATIENT)
Facility: HOSPITAL | Age: 73
Setting detail: OUTPATIENT SURGERY
Discharge: HOME | End: 2018-02-08
Attending: SURGERY | Admitting: SURGERY
Payer: MEDICARE

## 2018-02-08 ENCOUNTER — ANESTHESIA (OUTPATIENT)
Dept: PERIOP | Facility: HOSPITAL | Age: 73
End: 2018-02-08
Payer: MEDICARE

## 2018-02-08 VITALS
SYSTOLIC BLOOD PRESSURE: 119 MMHG | HEART RATE: 83 BPM | DIASTOLIC BLOOD PRESSURE: 72 MMHG | TEMPERATURE: 97.5 F | RESPIRATION RATE: 14 BRPM | OXYGEN SATURATION: 95 % | WEIGHT: 165.6 LBS | BODY MASS INDEX: 24.53 KG/M2 | HEIGHT: 69 IN

## 2018-02-08 DIAGNOSIS — K40.90 RIGHT INGUINAL HERNIA: Primary | ICD-10-CM

## 2018-02-08 PROCEDURE — 49650 LAP ING HERNIA REPAIR INIT: CPT | Performed by: SURGERY

## 2018-02-08 PROCEDURE — C1781 MESH (IMPLANTABLE): HCPCS | Performed by: SURGERY

## 2018-02-08 PROCEDURE — 49650 LAP ING HERNIA REPAIR INIT: CPT | Performed by: PHYSICIAN ASSISTANT

## 2018-02-08 DEVICE — LAPAROSCOPIC SELF-FIXATING MESH, RIGHT ANATOMICAL
Type: IMPLANTABLE DEVICE | Site: INGUINAL | Status: FUNCTIONAL
Brand: PROGRIP

## 2018-02-08 RX ORDER — ROCURONIUM BROMIDE 10 MG/ML
INJECTION, SOLUTION INTRAVENOUS AS NEEDED
Status: DISCONTINUED | OUTPATIENT
Start: 2018-02-08 | End: 2018-02-08 | Stop reason: SURG

## 2018-02-08 RX ORDER — BUPIVACAINE HYDROCHLORIDE AND EPINEPHRINE 2.5; 5 MG/ML; UG/ML
INJECTION, SOLUTION EPIDURAL; INFILTRATION; INTRACAUDAL; PERINEURAL AS NEEDED
Status: DISCONTINUED | OUTPATIENT
Start: 2018-02-08 | End: 2018-02-08 | Stop reason: HOSPADM

## 2018-02-08 RX ORDER — SODIUM CHLORIDE, SODIUM LACTATE, POTASSIUM CHLORIDE, CALCIUM CHLORIDE 600; 310; 30; 20 MG/100ML; MG/100ML; MG/100ML; MG/100ML
125 INJECTION, SOLUTION INTRAVENOUS CONTINUOUS
Status: DISCONTINUED | OUTPATIENT
Start: 2018-02-08 | End: 2018-02-08 | Stop reason: HOSPADM

## 2018-02-08 RX ORDER — GLYCOPYRROLATE 0.2 MG/ML
INJECTION INTRAMUSCULAR; INTRAVENOUS AS NEEDED
Status: DISCONTINUED | OUTPATIENT
Start: 2018-02-08 | End: 2018-02-08 | Stop reason: SURG

## 2018-02-08 RX ORDER — MIDAZOLAM HYDROCHLORIDE 1 MG/ML
INJECTION INTRAMUSCULAR; INTRAVENOUS AS NEEDED
Status: DISCONTINUED | OUTPATIENT
Start: 2018-02-08 | End: 2018-02-08 | Stop reason: SURG

## 2018-02-08 RX ORDER — SODIUM CHLORIDE 9 MG/ML
125 INJECTION, SOLUTION INTRAVENOUS CONTINUOUS
Status: DISCONTINUED | OUTPATIENT
Start: 2018-02-08 | End: 2018-02-08 | Stop reason: HOSPADM

## 2018-02-08 RX ORDER — METOPROLOL SUCCINATE 100 MG/1
100 TABLET, EXTENDED RELEASE ORAL DAILY
Status: DISCONTINUED | OUTPATIENT
Start: 2018-02-08 | End: 2018-02-08 | Stop reason: HOSPADM

## 2018-02-08 RX ORDER — ONDANSETRON 2 MG/ML
INJECTION INTRAMUSCULAR; INTRAVENOUS AS NEEDED
Status: DISCONTINUED | OUTPATIENT
Start: 2018-02-08 | End: 2018-02-08 | Stop reason: SURG

## 2018-02-08 RX ORDER — ONDANSETRON 2 MG/ML
4 INJECTION INTRAMUSCULAR; INTRAVENOUS EVERY 6 HOURS PRN
Status: DISCONTINUED | OUTPATIENT
Start: 2018-02-08 | End: 2018-02-08 | Stop reason: HOSPADM

## 2018-02-08 RX ORDER — EPHEDRINE SULFATE 50 MG/ML
INJECTION, SOLUTION INTRAVENOUS AS NEEDED
Status: DISCONTINUED | OUTPATIENT
Start: 2018-02-08 | End: 2018-02-08 | Stop reason: SURG

## 2018-02-08 RX ORDER — HYDROCODONE BITARTRATE AND ACETAMINOPHEN 5; 325 MG/1; MG/1
2 TABLET ORAL EVERY 6 HOURS PRN
Status: DISCONTINUED | OUTPATIENT
Start: 2018-02-08 | End: 2018-02-08 | Stop reason: HOSPADM

## 2018-02-08 RX ORDER — FENTANYL CITRATE 50 UG/ML
50 INJECTION, SOLUTION INTRAMUSCULAR; INTRAVENOUS
Status: DISCONTINUED | OUTPATIENT
Start: 2018-02-08 | End: 2018-02-08 | Stop reason: HOSPADM

## 2018-02-08 RX ORDER — MORPHINE SULFATE 2 MG/ML
2 INJECTION, SOLUTION INTRAMUSCULAR; INTRAVENOUS
Status: DISCONTINUED | OUTPATIENT
Start: 2018-02-08 | End: 2018-02-08 | Stop reason: HOSPADM

## 2018-02-08 RX ORDER — DEXAMETHASONE SODIUM PHOSPHATE 4 MG/ML
INJECTION, SOLUTION INTRA-ARTICULAR; INTRALESIONAL; INTRAMUSCULAR; INTRAVENOUS; SOFT TISSUE AS NEEDED
Status: DISCONTINUED | OUTPATIENT
Start: 2018-02-08 | End: 2018-02-08 | Stop reason: SURG

## 2018-02-08 RX ORDER — HYDROCODONE BITARTRATE AND ACETAMINOPHEN 5; 325 MG/1; MG/1
1 TABLET ORAL EVERY 4 HOURS PRN
Status: DISCONTINUED | OUTPATIENT
Start: 2018-02-08 | End: 2018-02-08 | Stop reason: HOSPADM

## 2018-02-08 RX ORDER — HYDROMORPHONE HYDROCHLORIDE 2 MG/ML
INJECTION, SOLUTION INTRAMUSCULAR; INTRAVENOUS; SUBCUTANEOUS AS NEEDED
Status: DISCONTINUED | OUTPATIENT
Start: 2018-02-08 | End: 2018-02-08 | Stop reason: SURG

## 2018-02-08 RX ORDER — FENTANYL CITRATE 50 UG/ML
INJECTION, SOLUTION INTRAMUSCULAR; INTRAVENOUS AS NEEDED
Status: DISCONTINUED | OUTPATIENT
Start: 2018-02-08 | End: 2018-02-08 | Stop reason: SURG

## 2018-02-08 RX ORDER — HYDROCODONE BITARTRATE AND ACETAMINOPHEN 5; 325 MG/1; MG/1
1-2 TABLET ORAL EVERY 6 HOURS PRN
Qty: 30 TABLET | Refills: 0 | Status: SHIPPED | OUTPATIENT
Start: 2018-02-08 | End: 2018-02-18

## 2018-02-08 RX ORDER — LORAZEPAM 2 MG/ML
0.5 INJECTION INTRAMUSCULAR
Status: DISCONTINUED | OUTPATIENT
Start: 2018-02-08 | End: 2018-02-08 | Stop reason: HOSPADM

## 2018-02-08 RX ORDER — PROPOFOL 10 MG/ML
INJECTION, EMULSION INTRAVENOUS AS NEEDED
Status: DISCONTINUED | OUTPATIENT
Start: 2018-02-08 | End: 2018-02-08 | Stop reason: SURG

## 2018-02-08 RX ADMIN — PROPOFOL 200 MG: 10 INJECTION, EMULSION INTRAVENOUS at 09:41

## 2018-02-08 RX ADMIN — METOPROLOL SUCCINATE 100 MG: 100 TABLET, EXTENDED RELEASE ORAL at 08:52

## 2018-02-08 RX ADMIN — MIDAZOLAM HYDROCHLORIDE 2 MG: 1 INJECTION, SOLUTION INTRAMUSCULAR; INTRAVENOUS at 09:34

## 2018-02-08 RX ADMIN — SODIUM CHLORIDE 125 ML/HR: 0.9 INJECTION, SOLUTION INTRAVENOUS at 08:34

## 2018-02-08 RX ADMIN — FENTANYL CITRATE 50 MCG: 50 INJECTION INTRAMUSCULAR; INTRAVENOUS at 10:43

## 2018-02-08 RX ADMIN — SODIUM CHLORIDE: 0.9 INJECTION, SOLUTION INTRAVENOUS at 09:54

## 2018-02-08 RX ADMIN — CEFAZOLIN SODIUM 1000 MG: 1 SOLUTION INTRAVENOUS at 09:47

## 2018-02-08 RX ADMIN — PROPOFOL 20 MG: 10 INJECTION, EMULSION INTRAVENOUS at 11:16

## 2018-02-08 RX ADMIN — HYDROMORPHONE HYDROCHLORIDE 0.5 MG: 2 INJECTION, SOLUTION INTRAMUSCULAR; INTRAVENOUS; SUBCUTANEOUS at 10:13

## 2018-02-08 RX ADMIN — LIDOCAINE HYDROCHLORIDE 80 MG: 20 INJECTION, SOLUTION INTRAVENOUS at 09:41

## 2018-02-08 RX ADMIN — SODIUM CHLORIDE: 0.9 INJECTION, SOLUTION INTRAVENOUS at 10:39

## 2018-02-08 RX ADMIN — ONDANSETRON HYDROCHLORIDE 4 MG: 2 INJECTION, SOLUTION INTRAVENOUS at 09:34

## 2018-02-08 RX ADMIN — HYDROCODONE BITARTRATE AND ACETAMINOPHEN 1 TABLET: 5; 325 TABLET ORAL at 13:12

## 2018-02-08 RX ADMIN — EPHEDRINE SULFATE 10 MG: 50 INJECTION, SOLUTION INTRAMUSCULAR; INTRAVENOUS; SUBCUTANEOUS at 09:55

## 2018-02-08 RX ADMIN — NEOSTIGMINE METHYLSULFATE 3 MG: 1 INJECTION, SOLUTION INTRAMUSCULAR; INTRAVENOUS; SUBCUTANEOUS at 11:07

## 2018-02-08 RX ADMIN — FENTANYL CITRATE 100 MCG: 50 INJECTION INTRAMUSCULAR; INTRAVENOUS at 10:10

## 2018-02-08 RX ADMIN — FENTANYL CITRATE 100 MCG: 50 INJECTION INTRAMUSCULAR; INTRAVENOUS at 09:41

## 2018-02-08 RX ADMIN — EPHEDRINE SULFATE 10 MG: 50 INJECTION, SOLUTION INTRAMUSCULAR; INTRAVENOUS; SUBCUTANEOUS at 09:52

## 2018-02-08 RX ADMIN — GLYCOPYRROLATE 0.6 MG: 0.2 INJECTION, SOLUTION INTRAMUSCULAR; INTRAVENOUS at 11:07

## 2018-02-08 RX ADMIN — ROCURONIUM BROMIDE 50 MG: 10 INJECTION INTRAVENOUS at 09:41

## 2018-02-08 RX ADMIN — LORAZEPAM 0.5 MG: 2 INJECTION INTRAMUSCULAR; INTRAVENOUS at 11:28

## 2018-02-08 RX ADMIN — DEXAMETHASONE SODIUM PHOSPHATE 4 MG: 4 INJECTION, SOLUTION INTRAMUSCULAR; INTRAVENOUS at 10:05

## 2018-02-08 NOTE — DISCHARGE INSTRUCTIONS
[facility]  Post-Operative Care Instructions  Dr Ashley Ovalle MD, [facility]  [phone]    1  General: You will feel pulling sensations around the wound or funny aches and pains around the incisions  This is normal  Even minor surgery is a change in your body and this is your bodys way of reaction to it  If you have had abdominal surgery, it may help to support the incision with a small pillow or blanket for comfort when moving or coughing  2  Wound care:  Okay to shower  The glue will fall off over the next week or 2  Right arm skin tear - keep xeroform guaze on wound for 48-72 hours  Then change and apply dermagran guaze and change every other day  3  Water: You may shower over the wound, unless there are drain tubes left in place  Do not bathe or use a pool or hot tub until cleared by the physician  4  Activity: You may go up and down stairs, walk as much as you are comfortable, but walk at least 3 times each day  If you have had abdominal surgery, do not lift anything heavier than 20 pounds for at least 4 weeks, unless cleared by the doctor  5  Diet: You may resume a regular diet  If you had a same-day surgery or overnight stay surgery, he may wish to eat lightly for a few days: soups, crackers, and sandwiches  You may resume a regular diet when ready  6  Medications: Resume all of your previous medications, unless told otherwise by the doctor  Avoid aspirin or ibuprofen (Advil, Motrin, etc ) products for 2-3 days after the date of surgery  You may, at that time, began to take them again  Tylenol is always fine, unless you are taking any narcotic pain medication containing Tylenol (such as Percocet, Darvocet, Vicodin, or anything containing acetaminophen)  Do not take Tylenol if you're taking these medications  You do not need to take the narcotic pain medications unless you are having significant pain and discomfort  7  Driving:  You will need someone to drive you home on the day of surgery  Do not drive or make any important decisions while on narcotic pain medication or 24 hours and after anesthesia or sedation for surgery  Generally, you may drive when your off all narcotic pain medications  8  Upset Stomach: You may take Maalox, Tums, or similar items for an upset stomach  If your narcotic pain medication causes an upset stomach, do not take it on an empty stomach  Try taking it with at least some crackers or toast      9  Constipation: Patients often experienced constipation after surgery  You may take over-the-counter medication for this, such as Metamucil, Senokot, Dulcolax, milk of magnesia, etc  You may take a suppository unless you have had anorectal surgery such as a procedure on your hemorrhoids  If you experience significant nausea or vomiting after abdominal surgery, call the office before trying any of these medications  10  Call the office: If you are experiencing any of the following, fevers above 101 5°, significant nausea or vomiting, if the wound develops drainage and/or is excessive redness around the wound, or if you have significant diarrhea or other worsening symptoms  11  Pain: You may be given a prescription for pain  This will be given to the hospital, the day of surgery  12  Sexual Activity: You may resume sexual activity when you feel ready and comfortable and your incision is sealed and healed without apparent infection risk

## 2018-02-08 NOTE — OP NOTE
OPERATIVE REPORT  PATIENT NAME: Marvene Opitz    :  1945  MRN: [id number]  Pt Location: [facility] OR ROOM 06    SURGERY DATE: 2018    Surgeon(s) and Role:     * Cassandra Puente PA-C - Assisting     * Marietta White PA-C - Observing     * Lyly Rodriguez MD - Primary    Preop Diagnosis:  Unilateral inguinal hernia without obstruction or gangrene [K40 90]    Post-Op Diagnosis Codes:     * Unilateral inguinal hernia without obstruction or gangrene [K40 90]    Procedure(s) (LRB):  REPAIR HERNIA INGUINAL LAPAROSCOPIC W/ ROBOTICS WITH MESH (Right)    Specimen(s):  * No specimens in log *    Estimated Blood Loss:   0 mL    Drains:       Anesthesia Type:   General    Operative Indications:  Unilateral inguinal hernia without obstruction or gangrene [K40 90]      Operative Findings:  Large direct inguinal hernia and indirect inguinal hernia    Complications:   None    Procedure and Technique:  The patient was seen again in the Holding Room  The risks, benefits, complications, treatment options, and expected outcomes were discussed with the patient  The possibilities of reaction to medication, pulmonary aspiration, perforation of viscus, bleeding, postoperative short or long term nerve entrapment causing pain,recurrent infection, the need for additional procedures, and development of a complication requiring transfusion or further operation were discussed with the patient and/or family  There was concurrence with the proposed plan, and informed consent was obtained  The site of surgery was properly noted/marked  The patient was taken to the Operating Room, identified as Marvene Opitz and the procedure verified as hernia repair  A Time Out was held and the above information confirmed  The patient was prepped and draped in a sterile fashion  A timeout was again performed  Local anesthesia was used in the incision  An umbilical incision was made    Dissection carried out to the fascia which was grasped with Kocher's and elevated  The fascia was incised and 11 mm trocars placed in the direct visualization  The abdomen is inflated the counters placed in  Two8 mm trocars were placed lateral to rectus muscle approximately at the level of the umbilicus  At this point the patient was placed into Trendelenburg position and the robot was docked and the instruments were placed in  The patient was noted to have right inguinal hernia   The peritoneum was incised from the medial umbilical fold out laterally past the internal ring on the right  Next the direct space was mobilized by exposing Maikel's ligament all the way along its length to the pubic tubercle  There was a large direct hernia defect was seen and this was dissected and reduced  There was indirect hernia sac this was carefully mobilized off the cord structures with care to avoid injury to the gonadal vessels or spermatic cord  The remainder of the inferior flap was created  At this point Pro  mesh was selected and placed into the preperitoneal space  The mesh was deployed and placed in the appropriate position  The edge of the peritoneum was well below the edge of the mesh  The mesh was then sutured closed with the V lock suture  Now the repair was complete the robot was undocked  The umbilical trocor site was closed with an  0-vicryl using a suture Passer  The wound was closed in multiple layers using 3-0 Vicryl sutures and the skin closed using a 4-0 Monocryl subcuticular stitch  The wound was dressed  The patient was anatomically correct at the end of the procedure  The patient tolerated the procedure in good condition  Instrument, sponge, and needle counts were correct prior to closure and at the conclusion of the case  This text is generated with voice recognition software  There may be translation, syntax,  or grammatical errors  If you have any questions, please contact the dictating provider        I was present for the entire procedure and A qualified resident physician was not available    Patient Disposition:  PACU     SIGNATURE: Miriam Estrella MD  DATE: February 8, 2018  TIME: 11:04 AM

## 2018-02-08 NOTE — ANESTHESIA POSTPROCEDURE EVALUATION
Post-Op Assessment Note      CV Status:  Stable    Mental Status:  Alert and awake    Hydration Status:  Euvolemic    PONV Controlled:  Controlled    Airway Patency:  Patent    Post Op Vitals Reviewed:  Yes              /57 (02/08/18 1130)    Temp (!) 97 2 °F (36 2 °C) (02/08/18 1130)    Pulse 80 (02/08/18 1130)   Resp (!) 10 (02/08/18 1130)    SpO2 98 % (02/08/18 1130)

## 2018-02-08 NOTE — H&P (VIEW-ONLY)
Assessment   1  Inguinal hernia, right (550 90) (K40 90)   2  ASCVD (arteriosclerotic cardiovascular disease) (429 2,440 9) (I25 10)    Discussion/Summary   Discussion Summary:    Beth Charles is a 71year old male who presents today, per referral by Dr Dallie Shone, for consultation regarding a possible right inguinal hernia  Physical exam revealed a reducible right inguinal hernia  Discussed risks, benefits, and alternatives to laparoscopic right inguinal hernia repair with mesh  Explained the procedure as well as pre- and post-operative protocol and restrictions  Lifting and activity restrictions will remain in place for at least one month after surgery  Encouraged him to walk for exercise to aid his recovery  He will schedule surgery for his earliest convenience after meeting with his new cardiologist for clearance  He knows to contact our office if any concerns or problems arise  cessation- Encouraged him to quit smoking to reduce risk of hernia formation, poor healing after surgery, and to improve overall health  He understands that his smoking increases his risk of recurrence  Goals and Barriers: The patient has the current Goals: Schedule surgery  Smoking cessation  The patent has the current Barriers: Nicotine addiction  Patient's Capacity to Self-Care: Patient is able to Self-Care  Patient Education: Educational resources provided:      Chief Complaint   Chief Complaint Free Text Note Form: Right inguinal hernia  New patient referred by his PCP, Sherry Bowie, whom he saw and was evaluated for a right inguinal bulge, right inguinal hernia  Patient is presently asymptomatic  No n/v/f/c  Urinating and moving bowels normally  History of left inguinal hernia repair in the past  Medical history of heart disease  Had been on Plavix  Now on aspirin 81mg daily  Smokes about 8 cigarettes a day        History of Present Illness   HPI: Beth Charles is a 71year old male who presents today, per referral by Dr Edel Taylor, for consultation regarding a possible right inguinal hernia  He had a left inguinal hernia repaired 14-16 years ago  He noticed a lump on the right side about 2-3 months ago  He reports it bulges out at night  He denies nausea, vomiting, fever, or chills  Urinating and moving bowels normally  He took Aspirin 81 mg and Plavix, and now is only taking Aspirin  He is seeing a new cardiologist in a few weeks  He currently smokes about 8 cigarettes a day  Review of Systems   Complete-Male:      Constitutional: as noted in HPI,-- no fever-- and-- no chills  Eyes: No complaints of eye pain, no red eyes, no discharge from eyes, no itchy eyes  ENT: no complaints of earache, no hearing loss, no nosebleeds, no nasal discharge, no sore throat, no hoarseness  Cardiovascular: No complaints of slow heart rate, no fast heart rate, no chest pain, no palpitations, no leg claudication, no lower extremity  Respiratory: No complaints of shortness of breath, no wheezing, no cough, no SOB on exertion, no orthopnea or PND  Gastrointestinal: abdominal pain, but-- as noted in HPI,-- no nausea,-- no vomiting,-- no constipation,-- no diarrhea-- and-- no blood in stools  Genitourinary: No complaints of dysuria, no incontinence, no hesitancy, no nocturia, no genital lesion, no testicular pain  Musculoskeletal: No complaints of arthralgia, no myalgias, no joint swelling or stiffness, no limb pain or swelling  Integumentary: No complaints of skin rash or skin lesions, no itching, no skin wound, no dry skin  Neurological: No compliants of headache, no confusion, no convulsions, no numbness or tingling, no dizziness or fainting, no limb weakness, no difficulty walking  Psychiatric: Is not suicidal, no sleep disturbances, no anxiety or depression, no change in personality, no emotional problems        Endocrine: No complaints of proptosis, no hot flashes, no muscle weakness, no erectile dysfunction, no deepening of the voice, no feelings of weakness  Hematologic/Lymphatic: No complaints of swollen glands, no swollen glands in the neck, does not bleed easily, no easy bruising  ROS Reviewed:    ROS reviewed  Active Problems   1  ASCVD (arteriosclerotic cardiovascular disease) (429 2,440 9) (I25 10)   2  At risk for bone density loss (V49 89) (Z91 89)   3  Current every day smoker (305 1) (F17 200)   4  Hearing loss (389 9) (H91 90)   5  Heme + stool (792 1) (R19 5)   6  Hyperlipidemia (272 4) (E78 5)   7  Inguinal hernia, right (550 90) (K40 90)   8  Lower back pain (724 2) (M54 5)   9  Medicare annual wellness visit, initial (V70 0) (Z00 00)   10  Myalgia (729 1) (M79 1)   11  Need for vaccination with 13-polyvalent pneumococcal conjugate vaccine (V03 82) (Z23)   12  Osteopenia (733 90) (M85 80)   13  Peripheral arterial disease (443 9) (I73 9)   14  Polyp of sigmoid colon (211 3) (D12 5)   15  Special screening examination for neoplasm of prostate (V76 44) (Z12 5)   16  Tobacco use (305 1) (Z72 0)    Past Medical History   1  History of Acute URI (465 9) (J06 9)   2  History of Ecchymoses, spontaneous (782 7) (R23 3)   3  History of acute bronchitis (V12 69) (Z87 09)   4  History of hypertension (V12 59) (Z86 79)   5  History of intermittent claudication (V12 50) (Z86 79)   6  History of Influenza vaccination administered during current admission (V04 81) (Z23)   7  History of Need for pneumococcal vaccination (V03 82) (Z23)   8  History of Trochanteric bursitis, unspecified laterality (726 5) (M70 60)  Active Problems And Past Medical History Reviewed: The active problems and past medical history were reviewed and updated today  Surgical History   1  History of Cardiac Cath Procedure Outcome:   2  History of Hernia Repair  Surgical History Reviewed: The surgical history was reviewed and updated today  Family History   Mother    1   Family history of Diabetes Mellitus (V18 0)  Father    2  Family history of Reported Previous Cardiac Problems  Sister    3  Family history of COPD, severe  Family History Reviewed: The family history was reviewed and updated today  Social History    · Being A Social Drinker   · Current every day smoker (305 1) (F17 200)   · Tobacco use (305 1) (Z72 0)  Social History Reviewed: The social history was reviewed and updated today  The social history was reviewed and is unchanged  Current Meds    1  Atorvastatin Calcium 80 MG Oral Tablet; take one tablet by mouth one time daily; Therapy: 70Bzc0332 to (Evaluate:12Nov2017)  Requested for: 59UDC9884; Last     Rx:[id number] Ordered   2  Baby Aspirin 81 MG CHEW; CHEW AND SWALLOW 1 TABLET DAILY; Therapy: (Recorded:17Jan2018) to Recorded   3  Calcium TABS; Take 1 tablet daily; Therapy: (Recorded:32Qxl0981) to Recorded   4  Metoprolol Succinate  MG Oral Tablet Extended Release 24 Hour; take 1 tablet by     mouth every day; Therapy: 49Mfo6100 to ([facility])  Requested for: 81Hvr8518; Last     Rx:[id number] Ordered   5  Vitamin D TABS; Take 1 tablet daily; Therapy: (Recorded:17Qgk0939) to Recorded  Medication List Reviewed: The medication list was reviewed and updated today  Allergies   1  Chantix TABS    Vitals   Vital Signs    Recorded: 87AXF0838 09:54AM   Temperature 97 F, Tympanic   Heart Rate 76, R Radial   Pulse Quality Normal, R Radial   Respiration Quality Normal   Respiration 16   Systolic 464, RUE, Sitting   Diastolic 82, RUE, Sitting   Height 5 ft 7 5 in   Weight 162 lb    BMI Calculated 25   BSA Calculated 1 86     Physical Exam        Constitutional      General appearance: No acute distress, well appearing and well nourished  Eyes      Conjunctiva and lids: No swelling, erythema, or discharge  Pupils and irises: Equal, round and reactive to light  Sclera non-icteric         Ears, Nose, Mouth, and Throat External inspection of ears and nose: Normal        Neck      Supple, symmetric, trachea midline, no masses      Pulmonary      Respiratory effort: No increased work of breathing or signs of respiratory distress  Auscultation of lungs: Clear to auscultation, equal breath sounds bilaterally, no wheezes, no rales, no rhonci  Cardiovascular      Auscultation of heart: Normal rate and rhythm, normal S1 and S2, without murmurs  Examination of extremities for edema and/or varicosities: Normal        Carotid pulses: Normal        Abdomen      Abdomen: Abnormal  -- Reducible right inguinal hernia  Liver and spleen: No hepatomegaly or splenomegaly  Lymphatic      Palpation of lymph nodes in neck: No lymphadenopathy  Musculoskeletal      Digits and nails: Normal without clubbing or cyanosis  Extremities: No clubbing, no cyanosis, no edema      Skin      Skin and subcutaneous tissue: Normal without rashes or lesions  Neurologic      Sensation: Motor and sensory grossly intact  Psychiatric      Orientation to person, place and time: Normal        Mood and affect: Normal        Attending Note   Scribe Attestation:      Scribe Attestation Inocencio RENDON am acting as a scribe in the presence of the attending physician while the attending physician examines the patient  Physician Attestation:      Lesly Leal personally performed the services described in this documentation as scribed in my presence, and it is both accurate and complete        Future Appointments      Date/Time Provider Specialty Site   01/29/2018 08:00 AM Lonny Jackson DO [facility]  [facility]     Signatures    Electronically signed by : Lui Son MD; Jan 17 2018  3:12PM EST                       (Author)

## 2018-02-21 ENCOUNTER — OFFICE VISIT (OUTPATIENT)
Dept: SURGERY | Facility: MEDICAL CENTER | Age: 73
End: 2018-02-21

## 2018-02-21 VITALS — TEMPERATURE: 98 F | HEIGHT: 69 IN | WEIGHT: 166.6 LBS | BODY MASS INDEX: 24.68 KG/M2

## 2018-02-21 DIAGNOSIS — Z87.19 STATUS POST LAPAROSCOPIC HERNIA REPAIR: Primary | ICD-10-CM

## 2018-02-21 DIAGNOSIS — Z98.890 STATUS POST LAPAROSCOPIC HERNIA REPAIR: Primary | ICD-10-CM

## 2018-02-21 PROCEDURE — 99024 POSTOP FOLLOW-UP VISIT: CPT | Performed by: SURGERY

## 2018-02-21 NOTE — PROGRESS NOTES
Assessment/Plan: Andreia Stein is a 71year old male who presents today in post-operative state status post laparoscopic robotic repair of right inguinal hernia repair with mesh done on 2/8/18  Physical exam revealed his incisions are healing well with some bruising of the umbilicus  He understands lifting restrictions will remain in place for 2 more weeks, until 1 full month after surgery  He knows to follow up if any questions or concerns arise  No problem-specific Assessment & Plan notes found for this encounter  Diagnoses and all orders for this visit:    Status post laparoscopic hernia repair          Subjective:      Patient ID: Bernice Aviles is a 71 y o  male  Andreia Stein is a 71year old male who presents today in post-operative state status post laparoscopic robotic repair of right inguinal hernia repair with mesh done on 2/8/18  He reports he is doing well  He only took pain medicine for two days  His appetite is good  Normal bladder and bowel habits  The following portions of the patient's history were reviewed and updated as appropriate: allergies, current medications, past family history, past medical history, past social history, past surgical history and problem list     Review of Systems   Constitutional: Negative  HENT: Negative  Eyes: Negative  Respiratory: Negative  Cardiovascular: Negative  Gastrointestinal: Negative  Endocrine: Negative  Genitourinary: Negative  Musculoskeletal: Negative  Skin: Negative  Allergic/Immunologic: Negative  Neurological: Negative  Hematological: Negative  Psychiatric/Behavioral: Negative  All other systems reviewed and are negative  Objective:      Temp 98 °F (36 7 °C) (Tympanic)   Ht 5' 9" (1 753 m)   Wt 75 6 kg (166 lb 9 6 oz)   BMI 24 60 kg/m²          Physical Exam   Constitutional: He is oriented to person, place, and time  He appears well-developed and well-nourished  No distress  HENT:   Head: Normocephalic and atraumatic  Right Ear: External ear normal    Left Ear: External ear normal    Nose: Nose normal    Mouth/Throat: Oropharynx is clear and moist  No oropharyngeal exudate  Eyes: Conjunctivae and EOM are normal  Right eye exhibits no discharge  Left eye exhibits no discharge  No scleral icterus  Neck: Normal range of motion  Neck supple  No JVD present  No tracheal deviation present  No thyromegaly present  Cardiovascular: Normal rate, regular rhythm, normal heart sounds and intact distal pulses  Exam reveals no gallop and no friction rub  No murmur heard  Pulmonary/Chest: Effort normal and breath sounds normal  No stridor  No respiratory distress  He has no wheezes  He has no rales  He exhibits no tenderness  Abdominal: Soft  Bowel sounds are normal  He exhibits no distension and no mass  There is no tenderness  There is no rebound and no guarding  Incisions are healing well, bruising at the umbilicus  Musculoskeletal: Normal range of motion  He exhibits no edema, tenderness or deformity  Lymphadenopathy:     He has no cervical adenopathy  Neurological: He is alert and oriented to person, place, and time  No cranial nerve deficit  Coordination normal    Skin: Skin is dry  No rash noted  He is not diaphoretic  No erythema  No pallor  Scabs of right forearm  Psychiatric: He has a normal mood and affect  His behavior is normal  Thought content normal    Nursing note and vitals reviewed  By signing my name below, Radhames Delgado, attest that this documentation has been prepared under the direction and in the presence of Dr Uzair Moser MD  Signed: Zion Simmons Medical Scribe  2/21/18  9:26  Moose Pena, personally performed the services described in this documentation  All Medical record entries made by the scribe were at my direction and in my presence   I have reviewed the chart and agree that the record reflects my personal performance and is accurate and complete  Dr Silas Marley MD  2/21/18  9:26

## 2018-05-01 DIAGNOSIS — I25.10 CORONARY ARTERY DISEASE INVOLVING NATIVE HEART WITHOUT ANGINA PECTORIS, UNSPECIFIED VESSEL OR LESION TYPE: Primary | ICD-10-CM

## 2018-06-27 ENCOUNTER — OFFICE VISIT (OUTPATIENT)
Dept: CARDIOLOGY CLINIC | Facility: CLINIC | Age: 73
End: 2018-06-27
Payer: MEDICARE

## 2018-06-27 ENCOUNTER — HOSPITAL ENCOUNTER (OUTPATIENT)
Dept: NON INVASIVE DIAGNOSTICS | Facility: CLINIC | Age: 73
Discharge: HOME | End: 2018-06-27
Payer: MEDICARE

## 2018-06-27 VITALS
SYSTOLIC BLOOD PRESSURE: 140 MMHG | HEIGHT: 69 IN | BODY MASS INDEX: 23.49 KG/M2 | DIASTOLIC BLOOD PRESSURE: 82 MMHG | RESPIRATION RATE: 16 BRPM | HEART RATE: 68 BPM | WEIGHT: 158.6 LBS

## 2018-06-27 DIAGNOSIS — I25.10 CORONARY ARTERY DISEASE INVOLVING NATIVE HEART WITHOUT ANGINA PECTORIS, UNSPECIFIED VESSEL OR LESION TYPE: ICD-10-CM

## 2018-06-27 DIAGNOSIS — E78.5 DYSLIPIDEMIA: ICD-10-CM

## 2018-06-27 DIAGNOSIS — I25.10 CORONARY ARTERY DISEASE INVOLVING NATIVE CORONARY ARTERY OF NATIVE HEART WITHOUT ANGINA PECTORIS: Primary | ICD-10-CM

## 2018-06-27 DIAGNOSIS — I10 BENIGN ESSENTIAL HTN: ICD-10-CM

## 2018-06-27 PROCEDURE — 93306 TTE W/DOPPLER COMPLETE: CPT | Performed by: INTERNAL MEDICINE

## 2018-06-27 PROCEDURE — 93306 TTE W/DOPPLER COMPLETE: CPT

## 2018-06-27 PROCEDURE — 99214 OFFICE O/P EST MOD 30 MIN: CPT | Performed by: INTERNAL MEDICINE

## 2018-06-27 RX ORDER — LOSARTAN POTASSIUM 25 MG/1
25 TABLET ORAL DAILY
Qty: 30 TABLET | Refills: 5 | Status: SHIPPED | OUTPATIENT
Start: 2018-06-27 | End: 2019-04-18 | Stop reason: SDUPTHER

## 2018-06-27 RX ORDER — METOPROLOL SUCCINATE 100 MG/1
100 TABLET, EXTENDED RELEASE ORAL DAILY
Qty: 30 TABLET | Refills: 11 | Status: SHIPPED | OUTPATIENT
Start: 2018-06-27 | End: 2019-08-04 | Stop reason: SDUPTHER

## 2018-06-27 RX ORDER — ATORVASTATIN CALCIUM 80 MG/1
80 TABLET, FILM COATED ORAL DAILY
Qty: 30 TABLET | Refills: 11 | Status: SHIPPED | OUTPATIENT
Start: 2018-06-27 | End: 2019-07-13 | Stop reason: SDUPTHER

## 2018-06-27 NOTE — PATIENT INSTRUCTIONS
Start losartan 25 mg daily   Blood work before next visit, but after you see Dr Denice Gaona he wants to add anything else on  Watch BP at home, keep below 140/90---call me if it stays above that  Low salt diet  Follow up in 2 months

## 2018-06-27 NOTE — PROGRESS NOTES
Cardiology Initial Consultation        Mary Yip      1945      [id number]      Discussion/Summary:    1  CAD status post PCI/ARIK left circumflex artery 2013, setting of non STEMI:  Doing well, no symptoms of angina, patient remains active and compliant with aspirin, beta-blocker and statin  He states clopidogrel has been discontinued in the past, and we will keep him off for now  Echocardiogram today reveals ejection fraction about 60% with congenital hypertrophy  Moderate aortic regurgitation noted  2  Dyslipidemia:  Continue high-dose statin, follow up lipid panel before next visit  3  Hypertension:  Mildly elevated on today's visit, with concentric hypertrophy noted on echocardiogram, would like better blood pressure control  Will start losartan 25 mg daily which patient is agreeable with  He will report any adverse drug reactions  Will check BMP before next visit in 2 months  4  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%, prior Doppler 2015: Will follow-up Dopplers later this year, and consider vascular surgery evaluation if symptoms developed  5  Moderate AR:  Repeat echo in 1 year    Follow-up in 2 months after BMP, lipid panel  Patient to call if any changes  We filled atorvastatin and metoprolol today  Interval History: This is a very pleasant 62-year-old male with a significant history of CAD, status post PCI/ARIK to the mid circumflex in 2013, in the setting of non STEMI, to unknown coronary artery, as well as a history of hypertension and dyslipidemia  [facility] when he transitioned to our practice  His prior nuclear stress test June 2013, post left circumflex stenting revealed mid to distal inferolateral scar with mild heath-infarct ischemia  Ejection fraction has been within normal limits, per [facility] notes    Prior lower extremity arterial Dopplers 2015 have revealed greater than 75% stenosis right distal SFA, with BRAD, with prior aortoiliac duplex in 2013 unremarkable  A symptomatic standpoint he has been feeling well without exertional chest pain, shortness of breath, dizziness, palpitations, lower extremity edema  Clopidogrel has been discontinued in the past     Since his last visit 01/2018, he has undergone hernia surgery which he did well with  He continues to feel well with no complaints on today's visit  Vitals:  Vitals:    06/27/18 1110   BP: 140/82   Pulse: 68   Resp: 16   Weight: 71 9 kg (158 lb 9 6 oz)   Height: 5' 9" (1 753 m)         Past Medical History:   Diagnosis Date    Coronary artery disease     Full dentures     upper and lower    GERD (gastroesophageal reflux disease)     Hyperlipidemia     Hypertension     Inguinal hernia 02/2018    right side    Myocardial infarction ([address] )     Pneumonia     Sciatica     Wears glasses      Social History     Social History    Marital status: /Civil Union     Spouse name: N/A    Number of children: N/A    Years of education: N/A     Occupational History    Not on file  Social History Main Topics    Smoking status: Current Every Day Smoker     Packs/day: 0 50     Years: 40 00    Smokeless tobacco: Never Used    Alcohol use 6 0 oz/week     10 Cans of beer per week    Drug use: No    Sexual activity: Not on file     Other Topics Concern    Not on file     Social History Narrative    No narrative on file      No family history on file    Past Surgical History:   Procedure Laterality Date    APPENDECTOMY      CORONARY ANGIOPLASTY WITH STENT PLACEMENT  05/2013    x1    HERNIA REPAIR Left     inguinal hernia     LAPAROSCOPIC INGUINAL HERNIA REPAIR Right 02/08/2018    ROBOTIC REPAIR WITH MESH-MANASA TAPIA MD    TONSILLECTOMY      WISDOM TOOTH EXTRACTION         Current Outpatient Prescriptions:     aspirin 81 mg chewable tablet, Chew 81 mg daily, Disp: , Rfl:     atorvastatin (LIPITOR) 80 mg tablet, Take 1 tablet (80 mg total) by mouth daily, Disp: 30 tablet, Rfl: 11    calcium carbonate (CALCIUM 600) 600 MG tablet, Take 600 mg by mouth daily, Disp: , Rfl:     cholecalciferol (VITAMIN D3) 1,000 units tablet, Take 1,000 Units by mouth daily, Disp: , Rfl:     metoprolol succinate (TOPROL-XL) 100 mg 24 hr tablet, Take 1 tablet (100 mg total) by mouth daily, Disp: 30 tablet, Rfl: 11    losartan (COZAAR) 25 mg tablet, Take 1 tablet (25 mg total) by mouth daily, Disp: 30 tablet, Rfl: 5    Labs:  Hospital Outpatient Visit on 01/24/2018   Component Date Value    Ventricular Rate 01/24/2018 75     Atrial Rate 01/24/2018 75     ND Interval 01/24/2018 174     QRSD Interval 01/24/2018 94     QT Interval 01/24/2018 392     QTC Interval 01/24/2018 437     P Axis 01/24/2018 57     QRS Axis 01/24/2018 -66     T Wave Plummer 01/24/2018 27    Appointment on 01/24/2018   Component Date Value    Cholesterol 01/24/2018 194     Triglycerides 01/24/2018 91     HDL, Direct 01/24/2018 55     LDL Calculated 01/24/2018 121*    Sodium 01/24/2018 139     Potassium 01/24/2018 4 3     Chloride 01/24/2018 104     CO2 01/24/2018 26     Anion Gap 01/24/2018 9     BUN 01/24/2018 13     Creatinine 01/24/2018 0 85     Glucose, Fasting 01/24/2018 103*    Calcium 01/24/2018 8 4     AST 01/24/2018 20     ALT 01/24/2018 25     Alkaline Phosphatase 01/24/2018 82     Total Protein 01/24/2018 7 1     Albumin 01/24/2018 3 8     Total Bilirubin 01/24/2018 0 79     eGFR 01/24/2018 88     PSA 01/24/2018 3 5    Appointment on 01/24/2018   Component Date Value    WBC 01/24/2018 13 35*    RBC 01/24/2018 5 21     Hemoglobin 01/24/2018 17 9*    Hematocrit 01/24/2018 51 8*    MCV 01/24/2018 99*    MCH 01/24/2018 34 4*    MCHC 01/24/2018 34 6     RDW 01/24/2018 13 8     MPV 01/24/2018 10 3     Platelets 05/87/6047 274     Neutrophils Relative 01/24/2018 61     Lymphocytes Relative 01/24/2018 28     Monocytes Relative 01/24/2018 10     Eosinophils Relative 01/24/2018 1     Basophils Relative 01/24/2018 0     Neutrophils Absolute 01/24/2018 8 08*    Lymphocytes Absolute 01/24/2018 3 76     Monocytes Absolute 01/24/2018 1 32*    Eosinophils Absolute 01/24/2018 0 15     Basophils Absolute 01/24/2018 0 04          Review of Systems:  Review of Systems   Respiratory: Negative  Cardiovascular: Negative  All other systems reviewed and are negative  Physical Exam:  Physical Exam   Constitutional: He is oriented to person, place, and time  He appears well-developed and well-nourished  No distress  HENT:   Head: Normocephalic and atraumatic  Eyes: EOM are normal  Pupils are equal, round, and reactive to light  Right eye exhibits no discharge  No scleral icterus  Neck: Normal range of motion  Neck supple  No thyromegaly present  Cardiovascular: Normal rate, regular rhythm and normal heart sounds  Exam reveals no gallop and no friction rub  No murmur heard  Pulmonary/Chest: Effort normal and breath sounds normal    Abdominal: He exhibits no distension  There is no tenderness  There is no rebound and no guarding  Musculoskeletal: Normal range of motion  He exhibits no edema  Neurological: He is alert and oriented to person, place, and time  Coordination normal    Skin: Skin is warm and dry  No rash noted  He is not diaphoretic  No erythema  No pallor  Psychiatric: He has a normal mood and affect   His behavior is normal  Judgment and thought content normal

## 2018-08-28 ENCOUNTER — OFFICE VISIT (OUTPATIENT)
Dept: CARDIOLOGY CLINIC | Facility: CLINIC | Age: 73
End: 2018-08-28
Payer: MEDICARE

## 2018-08-28 VITALS
BODY MASS INDEX: 23.52 KG/M2 | HEART RATE: 71 BPM | HEIGHT: 69 IN | DIASTOLIC BLOOD PRESSURE: 68 MMHG | SYSTOLIC BLOOD PRESSURE: 136 MMHG | WEIGHT: 158.8 LBS

## 2018-08-28 DIAGNOSIS — I25.10 CORONARY ARTERY DISEASE INVOLVING NATIVE CORONARY ARTERY OF NATIVE HEART WITHOUT ANGINA PECTORIS: ICD-10-CM

## 2018-08-28 DIAGNOSIS — I25.10 CORONARY ARTERY DISEASE INVOLVING NATIVE HEART, ANGINA PRESENCE UNSPECIFIED, UNSPECIFIED VESSEL OR LESION TYPE: Primary | ICD-10-CM

## 2018-08-28 DIAGNOSIS — I10 BENIGN ESSENTIAL HTN: ICD-10-CM

## 2018-08-28 DIAGNOSIS — E78.5 DYSLIPIDEMIA: ICD-10-CM

## 2018-08-28 PROCEDURE — 99214 OFFICE O/P EST MOD 30 MIN: CPT | Performed by: INTERNAL MEDICINE

## 2018-08-28 PROCEDURE — 93000 ELECTROCARDIOGRAM COMPLETE: CPT | Performed by: INTERNAL MEDICINE

## 2018-08-28 NOTE — PROGRESS NOTES
Cardiology Initial Consultation        Arash Smith      1945      [id number]      Discussion/Summary:    1  CAD status post PCI/ARIK left circumflex artery 2013, setting of non STEMI:  Doing well, no symptoms of angina, patient remains active and compliant with aspirin, beta-blocker and statin  He states clopidogrel has been discontinued in the past, and we will keep him off for now  Echocardiogram 06/2018 reveals ejection fraction about 60% with congenital hypertrophy  Moderate aortic regurgitation noted  Will schedule stress echocardiogram to evaluate functional capacity, objective symptoms, and evaluate for myocardial ischemia before next visit in 6 months, especially in light of ongoing tobacco use  Had discussion regarding importance of complete and indefinite tobacco cessation, including risks of continued use  2  Dyslipidemia:  Continue high-dose statin  Patient did not get lipid panel as instructed  Have encouraged him to check in near future and call me 1 day after to discuss results over the phone  3  Hypertension:  Reasonable on today's visit  Have requested he purchase blood pressure monitor and check blood pressures at home, and call me if systolic blood pressure remains greater than 140 mm Hg  4  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%, prior Doppler 2015:  Patient does not report symptoms of claudication  May consider vascular surgery evaluation in the future  5  Moderate AR:  Repeat echo in 1 year    Follow-up in 6 months after stress echocardiogram   Lipid panel after which patient is to call to discuss results over the phone  Interval History: This is a very pleasant male with a significant history of CAD, status post PCI/ARIK to the mid circumflex in 2013, in the setting of non STEMI, to unknown coronary artery, as well as a history of hypertension and dyslipidemia    [facility] when he transitioned to our practice  His prior nuclear stress test June 2013, post left circumflex stenting revealed mid to distal inferolateral scar with mild heath-infarct ischemia  Ejection fraction has been within normal limits, per prior [facility] notes  Prior echocardiogram 06/27/2018 revealed ejection fraction 60% without regional wall motion abnormalities and moderate aortic insufficiency  Prior lower extremity arterial Dopplers 2015 have revealed greater than 75% stenosis right distal SFA, with BRAD, with prior aortoiliac duplex in 2013 unremarkable  He had hernia surgery 02/2018 which she did well with  From a symptomatic standpoint he has been feeling well without exertional chest pain, shortness of breath, dizziness, palpitations, lower extremity edema  Clopidogrel has been discontinued in the past   He continues to smoke 10 cigarettes daily  Vitals:  Vitals:    08/28/18 0949   BP: 136/68   BP Location: Right arm   Patient Position: Sitting   Cuff Size: Standard   Pulse: 71   Weight: 72 kg (158 lb 12 8 oz)   Height: 5' 9" (1 753 m)         Past Medical History:   Diagnosis Date    Coronary artery disease     Full dentures     upper and lower    GERD (gastroesophageal reflux disease)     Hyperlipidemia     Hypertension     Inguinal hernia 02/2018    right side    Myocardial infarction ([address] )     Pneumonia     Sciatica     Wears glasses      Social History     Social History    Marital status: /Civil Union     Spouse name: N/A    Number of children: N/A    Years of education: N/A     Occupational History    Not on file       Social History Main Topics    Smoking status: Current Every Day Smoker     Packs/day: 0 50     Years: 40 00    Smokeless tobacco: Never Used    Alcohol use 6 0 oz/week     10 Cans of beer per week    Drug use: No    Sexual activity: Not on file     Other Topics Concern    Not on file     Social History Narrative    No narrative on file No family history on file  Past Surgical History:   Procedure Laterality Date    APPENDECTOMY      CORONARY ANGIOPLASTY WITH STENT PLACEMENT  05/2013    x1    HERNIA REPAIR Left     inguinal hernia     LAPAROSCOPIC INGUINAL HERNIA REPAIR Right 02/08/2018    ROBOTIC REPAIR WITH MESH-MANASA TAPIA MD    TONSILLECTOMY      WISDOM TOOTH EXTRACTION         Current Outpatient Prescriptions:     aspirin 81 mg chewable tablet, Chew 81 mg daily, Disp: , Rfl:     atorvastatin (LIPITOR) 80 mg tablet, Take 1 tablet (80 mg total) by mouth daily, Disp: 30 tablet, Rfl: 11    calcium carbonate (CALCIUM 600) 600 MG tablet, Take 600 mg by mouth daily, Disp: , Rfl:     cholecalciferol (VITAMIN D3) 1,000 units tablet, Take 1,000 Units by mouth daily, Disp: , Rfl:     losartan (COZAAR) 25 mg tablet, Take 1 tablet (25 mg total) by mouth daily, Disp: 30 tablet, Rfl: 5    metoprolol succinate (TOPROL-XL) 100 mg 24 hr tablet, Take 1 tablet (100 mg total) by mouth daily, Disp: 30 tablet, Rfl: 11    Labs:  No visits with results within 6 Month(s) from this visit  Latest known visit with results is:   Hospital Outpatient Visit on 01/24/2018   Component Date Value    Ventricular Rate 01/24/2018 75     Atrial Rate 01/24/2018 75     WA Interval 01/24/2018 174     QRSD Interval 01/24/2018 94     QT Interval 01/24/2018 392     QTC Interval 01/24/2018 437     P Axis 01/24/2018 57     QRS Axis 01/24/2018 -66     T Wave Axis 01/24/2018 27          Review of Systems:  Review of Systems   Respiratory: Negative  Cardiovascular: Negative  All other systems reviewed and are negative  Physical Exam:  Physical Exam   Constitutional: He is oriented to person, place, and time  He appears well-developed and well-nourished  No distress  HENT:   Head: Normocephalic and atraumatic  Eyes: EOM are normal  Right eye exhibits no discharge  No scleral icterus  Neck: Normal range of motion  Neck supple   No thyromegaly present  Cardiovascular: Normal rate, regular rhythm and normal heart sounds  Exam reveals no gallop and no friction rub  No murmur heard  Pulmonary/Chest: Effort normal and breath sounds normal    Abdominal: He exhibits no distension  There is no tenderness  There is no rebound and no guarding  Musculoskeletal: Normal range of motion  He exhibits no edema  Neurological: He is alert and oriented to person, place, and time  Coordination normal    Skin: Skin is warm and dry  No rash noted  He is not diaphoretic  No erythema  No pallor  Psychiatric: He has a normal mood and affect   His behavior is normal  Judgment and thought content normal

## 2018-08-28 NOTE — PATIENT INSTRUCTIONS
Stress echocardiogram before next visit in 6 months   Check cholesterol and call me 1 day after to discuss results  Follow-up in 6 months   Cut down on smoking   Monitor blood pressures at home

## 2019-02-09 ENCOUNTER — HOSPITAL ENCOUNTER (EMERGENCY)
Facility: HOSPITAL | Age: 74
Discharge: HOME | End: 2019-02-09
Attending: EMERGENCY MEDICINE | Admitting: EMERGENCY MEDICINE
Payer: MEDICARE

## 2019-02-09 VITALS
DIASTOLIC BLOOD PRESSURE: 59 MMHG | RESPIRATION RATE: 16 BRPM | BODY MASS INDEX: 24.78 KG/M2 | SYSTOLIC BLOOD PRESSURE: 110 MMHG | OXYGEN SATURATION: 98 % | HEART RATE: 74 BPM | WEIGHT: 167.77 LBS | TEMPERATURE: 98.1 F

## 2019-02-09 DIAGNOSIS — R10.2 SUPRAPUBIC PRESSURE: ICD-10-CM

## 2019-02-09 DIAGNOSIS — R33.9 URINARY RETENTION: Primary | ICD-10-CM

## 2019-02-09 LAB
ALBUMIN SERPL BCP-MCNC: 3.7 G/DL (ref 3.5–5)
ALP SERPL-CCNC: 87 U/L (ref 46–116)
ALT SERPL W P-5'-P-CCNC: 27 U/L (ref 12–78)
ANION GAP SERPL CALCULATED.3IONS-SCNC: 11 MMOL/L (ref 4–13)
AST SERPL W P-5'-P-CCNC: 18 U/L (ref 5–45)
BACTERIA UR QL AUTO: ABNORMAL /HPF
BILIRUB SERPL-MCNC: 0.53 MG/DL (ref 0.2–1)
BILIRUB UR QL STRIP: NEGATIVE
BUN SERPL-MCNC: 11 MG/DL (ref 5–25)
CALCIUM SERPL-MCNC: 8.8 MG/DL (ref 8.3–10.1)
CHLORIDE SERPL-SCNC: 102 MMOL/L (ref 100–108)
CLARITY UR: CLEAR
CO2 SERPL-SCNC: 24 MMOL/L (ref 21–32)
COLOR UR: YELLOW
CREAT SERPL-MCNC: 0.79 MG/DL (ref 0.6–1.3)
GFR SERPL CREATININE-BSD FRML MDRD: 90 ML/MIN/1.73SQ M
GLUCOSE SERPL-MCNC: 113 MG/DL (ref 65–140)
GLUCOSE UR STRIP-MCNC: NEGATIVE MG/DL
HGB UR QL STRIP.AUTO: ABNORMAL
KETONES UR STRIP-MCNC: NEGATIVE MG/DL
LEUKOCYTE ESTERASE UR QL STRIP: ABNORMAL
NITRITE UR QL STRIP: POSITIVE
NON-SQ EPI CELLS URNS QL MICRO: ABNORMAL /HPF
PH UR STRIP.AUTO: 6 [PH] (ref 4.5–8)
POTASSIUM SERPL-SCNC: 4.1 MMOL/L (ref 3.5–5.3)
PROT SERPL-MCNC: 6.9 G/DL (ref 6.4–8.2)
PROT UR STRIP-MCNC: NEGATIVE MG/DL
RBC #/AREA URNS AUTO: ABNORMAL /HPF
SODIUM SERPL-SCNC: 137 MMOL/L (ref 136–145)
SP GR UR STRIP.AUTO: 1.01 (ref 1–1.03)
UROBILINOGEN UR QL STRIP.AUTO: 0.2 E.U./DL
WBC #/AREA URNS AUTO: ABNORMAL /HPF

## 2019-02-09 PROCEDURE — 81001 URINALYSIS AUTO W/SCOPE: CPT | Performed by: EMERGENCY MEDICINE

## 2019-02-09 PROCEDURE — 80053 COMPREHEN METABOLIC PANEL: CPT | Performed by: EMERGENCY MEDICINE

## 2019-02-09 PROCEDURE — 99284 EMERGENCY DEPT VISIT MOD MDM: CPT

## 2019-02-09 PROCEDURE — 36415 COLL VENOUS BLD VENIPUNCTURE: CPT | Performed by: EMERGENCY MEDICINE

## 2019-02-09 NOTE — ED ATTENDING ATTESTATION
Juan Todd MD, saw and evaluated the patient  I have discussed the patient with the resident/non-physician practitioner and agree with the resident's/non-physician practitioner's findings, Plan of Care, and MDM as documented in the resident's/non-physician practitioner's note, except where noted  All available labs and Radiology studies were reviewed  At this point I agree with the current assessment done in the Emergency Department  I have conducted an independent evaluation of this patient a history and physical is as follows:      Critical Care Time  Procedures     C/o suprapubic abd  Pain today, last urinated this am (just a few drops)  Also has some loose stool (nonbloody)  No fevers, no n/v    Well-appearing, no distress, RRR, CTA b/l, abd  +distention/mild tenderness suprapubic area, ext   No edema

## 2019-02-09 NOTE — ED PROVIDER NOTES
History  Chief Complaint   Patient presents with    Abdominal Pain     Reports lower abdominal pain x 3 hours  Reports pain is constant  Reports when attempts to urinate only a few drops come out, and does not feel relief, states ongoing for three hours  Denies blood in urine  States this has not happened before  Pt denies prostate problems   Back Pain    Diarrhea     HPI    This is a 46-year-old male who presents to the ED for evaluation of suprapubic pressure  He denies any abdominal pain, he just says he has had some discomfort since this morning  Patient states that he has had 1 episode of urination, without any blood around 10:00 a m  He since has been trying to urinate, without any success  He denies any history of BPH  On review of systems, he does endorse that he has some loose stool, with 1 to 2 episodes of nonbloody diarrhea this morning  Otherwise on ROS, he is denying any other symptoms  Prior to Admission Medications   Prescriptions Last Dose Informant Patient Reported?  Taking?   aspirin 81 mg chewable tablet  Self Yes Yes   Sig: Chew 81 mg daily   atorvastatin (LIPITOR) 80 mg tablet   No Yes   Sig: Take 1 tablet (80 mg total) by mouth daily   calcium carbonate (CALCIUM 600) 600 MG tablet  Self Yes Yes   Sig: Take 600 mg by mouth daily   cholecalciferol (VITAMIN D3) 1,000 units tablet  Self Yes Yes   Sig: Take 1,000 Units by mouth daily   losartan (COZAAR) 25 mg tablet   No Yes   Sig: Take 1 tablet (25 mg total) by mouth daily   metoprolol succinate (TOPROL-XL) 100 mg 24 hr tablet   No Yes   Sig: Take 1 tablet (100 mg total) by mouth daily      Facility-Administered Medications: None       Past Medical History:   Diagnosis Date    Coronary artery disease     Full dentures     upper and lower    GERD (gastroesophageal reflux disease)     Hyperlipidemia     Hypertension     Inguinal hernia 02/2018    right side    My[facility] [address] [address]     Pneumonia     Sciatica     Wears glasses        Past Surgical History:   Procedure Laterality Date    APPENDECTOMY      CORONARY ANGIOPLASTY WITH STENT PLACEMENT  05/2013    x1    HERNIA REPAIR Left     inguinal hernia     LAPAROSCOPIC INGUINAL HERNIA REPAIR Right 02/08/2018    ROBOTIC REPAIR WITH MESH-MANASA TAPIA MD    TONSILLECTOMY      WISDOM TOOTH EXTRACTION         History reviewed  No pertinent family history  I have reviewed and agree with the history as documented  Social History   Substance Use Topics    Smoking status: Current Every Day Smoker     Packs/day: 0 50     Years: 40 00    Smokeless tobacco: Never Used    Alcohol use 6 0 oz/week     10 Cans of beer per week        Review of Systems   Constitutional: Negative for chills, fatigue and fever  HENT: Negative for nosebleeds and sore throat  Eyes: Negative for redness and visual disturbance  Respiratory: Negative for shortness of breath and wheezing  Cardiovascular: Negative for chest pain and palpitations  Gastrointestinal: Positive for diarrhea  Negative for abdominal pain  Suprapubic pressure   Endocrine: Negative for polyuria  Genitourinary: Negative for difficulty urinating and testicular pain  Musculoskeletal: Negative for back pain and neck stiffness  Skin: Negative for rash and wound  Neurological: Negative for seizures, speech difficulty and headaches  Psychiatric/Behavioral: Negative for dysphoric mood and hallucinations  All other systems reviewed and are negative        Physical Exam  ED Triage Vitals [02/09/19 1517]   Temperature Pulse Respirations Blood Pressure SpO2   98 1 °F (36 7 °C) 80 16 163/82 97 %      Temp Source Heart Rate Source Patient Position - Orthostatic VS BP Location FiO2 (%)   Temporal Monitor Standing Left arm --      Pain Score       5           Orthostatic Vital Signs  Vitals:    02/09/19 1517 02/09/19 1713   BP: 163/82 110/59   Pulse: 80 74   Patient Position - Orthostatic VS: Standing        Physical Exam   Constitutional: He is oriented to person, place, and time  He appears well-developed and well-nourished  HENT:   Head: Normocephalic and atraumatic  Right Ear: External ear normal    Left Ear: External ear normal    Mouth/Throat: Oropharynx is clear and moist    Eyes: Pupils are equal, round, and reactive to light  Conjunctivae and EOM are normal    Neck: Normal range of motion  Cardiovascular: Normal rate, regular rhythm, normal heart sounds and intact distal pulses  Pulmonary/Chest: Effort normal and breath sounds normal  He has no wheezes  He exhibits no tenderness  Abdominal: Soft  Bowel sounds are normal  There is no tenderness  There is no guarding  On examination of patient's abdomen, he has no tenderness, he does have some discomfort when palpating the suprapubic region, bladder clearly palpated  Musculoskeletal: Normal range of motion  Neurological: He is alert and oriented to person, place, and time  No cranial nerve deficit or sensory deficit  He exhibits normal muscle tone  Coordination normal    Skin: Skin is warm and dry  No rash noted  Psychiatric: He has a normal mood and affect  Nursing note and vitals reviewed        ED Medications  Medications - No data to display    Diagnostic Studies  Results Reviewed     Procedure Component Value Units Date/Time    Urine Microscopic [[id number]]  (Abnormal) Collected:  02/09/19 1713    Lab Status:  Final result Specimen:  Urine from Urine, Indwelling Gupta Catheter Updated:  02/09/19 1738     RBC, UA 4-10 (A) /hpf      WBC, UA 2-4 (A) /hpf      Epithelial Cells Occasional /hpf      Bacteria, UA Moderate (A) /hpf     UA w Reflex to Microscopic [[id number]]  (Abnormal) Collected:  02/09/19 1713    Lab Status:  Final result Specimen:  Urine from Urine, Indwelling Gupta Catheter Updated:  02/09/19 1729     Color, UA Yellow     Clarity, UA Clear     Specific Gravity, UA 1 010     pH, UA 6 0     Leukocytes, UA Small (A)     Nitrite, UA Positive (A)     Protein, UA Negative mg/dl      Glucose, UA Negative mg/dl      Ketones, UA Negative mg/dl      Urobilinogen, UA 0 2 E U /dl      Bilirubin, UA Negative     Blood, UA Large (A)    Comprehensive metabolic panel [[id number]] Collected:  02/09/19 1642    Lab Status:  Final result Specimen:  Blood from Arm, Right Updated:  02/09/19 1703     Sodium 137 mmol/L      Potassium 4 1 mmol/L      Chloride 102 mmol/L      CO2 24 mmol/L      ANION GAP 11 mmol/L      BUN 11 mg/dL      Creatinine 0 79 mg/dL      Glucose 113 mg/dL      Calcium 8 8 mg/dL      AST 18 U/L      ALT 27 U/L      Alkaline Phosphatase 87 U/L      Total Protein 6 9 g/dL      Albumin 3 7 g/dL      Total Bilirubin 0 53 mg/dL      eGFR 90 ml/min/1 73sq m     Narrative:         National Kidney Disease Education Program recommendations are as follows:  GFR calculation is accurate only with a steady state creatinine  Chronic Kidney disease less than 60 ml/min/1 73 sq  meters  Kidney failure less than 15 ml/min/1 73 sq  meters  No orders to display         Procedures  Procedures      Phone Consults  ED Phone Contact    ED Course  ED Course as of Feb 09 2334   Sat Feb 09, 2019   1703 Creatinine: 0 79         MDM    This 61-year-old male who presents to ED for evaluation suprapubic discomfort, likely secondary to urinary retention, as bedside ultrasound revealed large distended bladder  Patient had a Gupta catheter placed, with immediate relief of symptoms  Patient's creatinine was 0 79  Patient denies any urinary symptoms, his urine sample was contaminated with epithelial cells, will not treat at this time  Patient discharged with Urology follow-up informed to call tomorrow for an appointment  The patient was instructed to follow up as documented  Strict return precautions were discussed with the patient and the patient was instructed to return to the emergency department immediately if symptoms worsen   The patient/patient family member acknowledged and were in agreement with plan  Disposition  Final diagnoses:   Urinary retention   Suprapubic pressure     Time reflects when diagnosis was documented in both MDM as applicable and the Disposition within this note     Time User Action Codes Description Comment    2/9/2019  4:48 PM Olivia Nay Add [R33 9] Urinary retention     2/9/2019  4:49 PM Olivia Nay Add [R10 2] Suprapubic pressure       ED Disposition     ED Disposition Condition Date/Time Comment    Discharge  Sat Feb 9, 2019  5:04 PM Guille Cesar discharge to home/self care      Condition at discharge: Good        Follow-up Information     Follow up With Specialties Details Why [address] [facility] Urology [facility] Urology Schedule an appointment as soon as possible for a visit in 1 day For follow up regarding your sal  [address] [facility] [address] [facility] [place] [address]  [address] [place] [address] Urology [facility], [address] [place], [place], [address] [facility] Emergency Department Emergency Medicine Go to If symptoms worsen [facility] [address]  [place] [address] ED, [address] [facility] [place], [place], [address]          Discharge Medication List as of 2/9/2019  5:06 PM      CONTINUE these medications which have NOT CHANGED    Details   aspirin 81 mg chewable tablet Chew 81 mg daily, Historical Med      atorvastatin (LIPITOR) 80 mg tablet Take 1 tablet (80 mg total) by mouth daily, Starting Wed 6/27/2018, Normal      calcium carbonate (CALCIUM 600) 600 MG tablet Take 600 mg by mouth daily, Historical Med      cholecalciferol (VITAMIN D3) 1,000 units tablet Take 1,000 Units by mouth daily, Historical Med      losartan (COZAAR) 25 mg tablet Take 1 tablet (25 mg total) by mouth daily, Starting Wed 6/27/2018, Normal      metoprolol succinate (TOPROL-XL) 100 mg 24 hr tablet Take 1 tablet (100 mg total) by mouth daily, Starting Wed 6/27/2018, Normal           No discharge procedures on file  ED Provider  Attending physically available and evaluated Amada Hart I managed the patient along with the ED Attending      Electronically Signed by         Alfredo Arriola MD  02/09/19 7195

## 2019-02-09 NOTE — DISCHARGE INSTRUCTIONS
Urinary Retention in Men   WHAT YOU NEED TO KNOW:   Urinary retention is a condition that develops when your bladder does not empty completely when you urinate  DISCHARGE INSTRUCTIONS:   Medicines:   · Medicines  can help decrease the size of your prostate, fight infection, and help you urinate more easily  · Take your medicine as directed  Contact your healthcare provider if you think your medicine is not helping or if you have side effects  Tell him or her if you are allergic to any medicine  Keep a list of the medicines, vitamins, and herbs you take  Include the amounts, and when and why you take them  Bring the list or the pill bottles to follow-up visits  Carry your medicine list with you in case of an emergency  Gupta catheter care: You may need a Gupta catheter for up to 2 weeks at home  Healthcare providers will give you a smaller leg bag to collect urine  Keep the bag below your waist  This will prevent urine from flowing back into your bladder and causing an infection or other problems  Also, keep the tube free of kinks so the urine will drain properly  Do not pull on the catheter  This can cause pain and bleeding, and may cause the catheter to come out  Ask your healthcare provider or urologist for more information on Gupta catheter care  Urinate regularly:  When your catheter is removed, do not let your bladder become too full before you urinate  Set regular times each day to urinate  Urinate as soon as you feel the need or at least every 3 hours while you are awake  Do not drink liquids before you go to bed  Urinate right before you go to bed  Follow up with your healthcare provider or urologist as directed:  Write down your questions so you remember to ask them during your visits  Contact your healthcare provider or urologist if:   · You have a fever  · You have pain when you urinate  · You have blood in your urine  · You have problems with your catheter      · You have questions or concerns about your condition or care  Return to the emergency department if:   · You have severe abdominal pain  · You are breathing faster than usual     · Your heartbeat is faster than usual     · Your face, hands, feet, or ankles are swollen  © 2017 [address] Information is for End User's use only and may not be sold, redistributed or otherwise used for commercial purposes  All illustrations and images included in CareNotes® are the copyrighted property of A D A M , Inc  or Jeronimo Garrison  The above information is an  only  It is not intended as medical advice for individual conditions or treatments  Talk to your doctor, nurse or pharmacist before following any medical regimen to see if it is safe and effective for you

## 2019-02-11 ENCOUNTER — TELEPHONE (OUTPATIENT)
Dept: UROLOGY | Facility: AMBULATORY SURGERY CENTER | Age: 74
End: 2019-02-11

## 2019-02-11 NOTE — TELEPHONE ENCOUNTER
Spoke with patient  Per patient, he has never seen a urologist before, never has problems with urinary retention  Patient confirms Medicare as insurance  Patient requesting [facility] office location  Patient scheduled for 2/14/19 at 8:30 in [facility] with Ashley Gold PA-C (R)  Patient confirmed appointment time  Office location and address provided to patient  Patient to arrive 15 minutes early for new patient paperwork

## 2019-02-11 NOTE — TELEPHONE ENCOUNTER
Patient called was seen in the ER and had sal placed  He would like an appointment in the [address] office  Reason for appointment/Complaint/Diagnosis : had sal    Insurance: medicare/BC    History of Cancer? no                       If yes, what kind? Previous urologist?     None                  Records requested/where? In [address] [place] [address] Rd    Outside testing/where? In [facility]    Location Preference for office visit?  [address]

## 2019-02-13 PROBLEM — R33.9 URINARY RETENTION: Status: ACTIVE | Noted: 2019-02-13

## 2019-02-13 NOTE — PROGRESS NOTES
2/14/2019      Chief Complaint   Patient presents with   [address] F/U from 2/9/19 d/c with sal in place       Assessment and Plan    68 y o  male     1  Urinary retention  - start tamsulosin 0 4 mg nightly, side-effects and dosing reviewed  - ensure no constipation  - maintain Sal catheter and undergo void trial next week to allow for tamsulosin benefits  - if patient fails with a PVR > 300 mL, will replace Sal catheter and test urine to ensure no infection, patient can then undergo a 2nd void trial after any necessary treatment  - if patient passes with a PVR < 300 mL will see him back in 1 month with a PVR      History of Present Illness  Pramod Esparza is a 68 y o  male here for initial evaluation of urinary retention  He was seen in the emergency department 2/9/2019 and was found to the have acute urinary retention  He has no history of BPH and was not started on Flomax  His urine was nitrite positive but not sent out for culture as the specimen was contaminated  Does admit to being constipated at that time as well as just having had 3-4 beers  At baseline the patient does admit to urinary hesitancy, straining to go, and postvoid dribbling  Review of Systems   Constitutional: Negative for activity change, chills and fever  Gastrointestinal: Negative for abdominal distention and abdominal pain  Musculoskeletal: Negative for back pain and gait problem  Psychiatric/Behavioral: Negative for behavioral problems and confusion         Past Medical History  Past Medical History:   Diagnosis Date    Coronary artery disease     Full dentures     upper and lower    GERD (gastroesophageal reflux disease)     Hyperlipidemia     Hypertension     Inguinal hernia 02/2018    right side    Myocardial infarction ([address] )     Pneumonia     Sciatica     Wears glasses        Past Social History  Past Surgical History:   Procedure Laterality Date    APPENDECTOMY      CORONARY ANGIOPLASTY WITH STENT PLACEMENT  05/2013    x1    HERNIA REPAIR Left     inguinal hernia     LAPAROSCOPIC INGUINAL HERNIA REPAIR Right 02/08/2018    ROBOTIC REPAIR WITH MESH-MANASA TAPIA MD    TONSILLECTOMY      WISDOM TOOTH EXTRACTION       Social History     Tobacco Use   Smoking Status Current Every Day Smoker    Packs/day: 0 50    Years: 40 00    Pack years: 20 00   Smokeless Tobacco Never Used       Past Family History  History reviewed  No pertinent family history  Past Social history  Social History     Socioeconomic History    Marital status: /Civil Union     Spouse name: Not on file    Number of children: Not on file    Years of education: Not on file    Highest education level: Not on file   Occupational History    Not on file   Social Needs    Financial resource strain: Not on file    Food insecurity:     Worry: Not on file     Inability: Not on file    Transportation needs:     Medical: Not on file     Non-medical: Not on file   Tobacco Use    Smoking status: Current Every Day Smoker     Packs/day: 0 50     Years: 40 00     Pack years: 20 00    Smokeless tobacco: Never Used   Substance and Sexual Activity    Alcohol use:  Yes     Alcohol/week: 6 0 oz     Types: 10 Cans of beer per week    Drug use: No    Sexual activity: Not on file   Lifestyle    Physical activity:     Days per week: Not on file     Minutes per session: Not on file    Stress: Not on file   Relationships    Social connections:     Talks on phone: Not on file     Gets together: Not on file     Attends Che[employer] service: Not on file     Active member of club or organization: Not on file     Attends meetings of clubs or organizations: Not on file     Relationship status: Not on file    Intimate partner violence:     Fear of current or ex partner: Not on file     Emotionally abused: Not on file     Physically abused: Not on file     Forced sexual activity: Not on file   Other Topics Concern    Not on file Social History Narrative    Not on file       Current Medications  Current Outpatient Medications   Medication Sig Dispense Refill    aspirin 81 mg chewable tablet Chew 81 mg daily      atorvastatin (LIPITOR) 80 mg tablet Take 1 tablet (80 mg total) by mouth daily 30 tablet 11    calcium carbonate (CALCIUM 600) 600 MG tablet Take 600 mg by mouth daily      cholecalciferol (VITAMIN D3) 1,000 units tablet Take 1,000 Units by mouth daily      losartan (COZAAR) 25 mg tablet Take 1 tablet (25 mg total) by mouth daily 30 tablet 5    metoprolol succinate (TOPROL-XL) 100 mg 24 hr tablet Take 1 tablet (100 mg total) by mouth daily 30 tablet 11    tamsulosin (FLOMAX) 0 4 mg Take 1 capsule (0 4 mg total) by mouth daily with dinner 30 capsule 11     No current facility-administered medications for this visit  Allergies  No Known Allergies      The following portions of the patient's history were reviewed and updated as appropriate: allergies, current medications, past medical history, past social history, past surgical history and problem list       Vitals  Vitals:    02/14/19 0828   BP: 120/78   BP Location: Left arm   Patient Position: Sitting   Cuff Size: Adult   Pulse: 76   Weight: 75 9 kg (167 lb 6 4 oz)   Height: 5' 9" (1 753 m)       Physical Exam  Constitutional   General appearance: Patient is seated and in no acute distress, well appearing and well nourished  Head and Face   Head and face: Normal     Eyes   Conjunctiva and lids: No erythema, swelling or discharge  Ears, Nose, Mouth, and Throat   Hearing: Normal     Pulmonary   Respiratory effort: No increased work of breathing or signs of respiratory distress  Cardiovascular   Examination of extremities for edema and/or varicosities: Normal     Abdomen   Abdomen: Non-tender, no masses  Musculoskeletal   Gait and station: Normal     Skin   Skin and subcutaneous tissue: Warm, dry, and intact  No visible lesions or rashes    Psychiatric Judgment and insight: Normal  Recent and remote memory:  Normal  Mood and affect: Normal      Results  No results found for this or any previous visit (from the past 1 hour(s)) ]  Lab Results   Component Value Date    PSA 3 5 01/24/2018     Lab Results   Component Value Date    CALCIUM 8 8 02/09/2019    K 4 1 02/09/2019    CO2 24 02/09/2019     02/09/2019    BUN 11 02/09/2019    CREATININE 0 79 02/09/2019     Lab Results   Component Value Date    WBC 13 35 (H) 01/24/2018    HGB 17 9 (H) 01/24/2018    HCT 51 8 (H) 01/24/2018    MCV 99 (H) 01/24/2018     01/24/2018       Orders  No orders of the defined types were placed in this encounter

## 2019-02-14 ENCOUNTER — OFFICE VISIT (OUTPATIENT)
Dept: UROLOGY | Facility: CLINIC | Age: 74
End: 2019-02-14
Payer: MEDICARE

## 2019-02-14 VITALS
SYSTOLIC BLOOD PRESSURE: 120 MMHG | HEIGHT: 69 IN | HEART RATE: 76 BPM | BODY MASS INDEX: 24.79 KG/M2 | DIASTOLIC BLOOD PRESSURE: 78 MMHG | WEIGHT: 167.4 LBS

## 2019-02-14 DIAGNOSIS — R33.9 URINARY RETENTION: Primary | ICD-10-CM

## 2019-02-14 PROCEDURE — 99203 OFFICE O/P NEW LOW 30 MIN: CPT | Performed by: PHYSICIAN ASSISTANT

## 2019-02-14 RX ORDER — TAMSULOSIN HYDROCHLORIDE 0.4 MG/1
0.4 CAPSULE ORAL
Qty: 30 CAPSULE | Refills: 11 | Status: SHIPPED | OUTPATIENT
Start: 2019-02-14 | End: 2019-11-11 | Stop reason: SDUPTHER

## 2019-02-18 ENCOUNTER — PROCEDURE VISIT (OUTPATIENT)
Dept: UROLOGY | Facility: CLINIC | Age: 74
End: 2019-02-18
Payer: MEDICARE

## 2019-02-18 VITALS
HEART RATE: 72 BPM | SYSTOLIC BLOOD PRESSURE: 140 MMHG | BODY MASS INDEX: 24.62 KG/M2 | DIASTOLIC BLOOD PRESSURE: 80 MMHG | HEIGHT: 69 IN | WEIGHT: 166.2 LBS

## 2019-02-18 DIAGNOSIS — R33.9 URINARY RETENTION: Primary | ICD-10-CM

## 2019-02-18 LAB — POST-VOID RESIDUAL VOLUME, ML POC: 79.91 ML

## 2019-02-18 PROCEDURE — 51798 US URINE CAPACITY MEASURE: CPT

## 2019-02-18 NOTE — PROGRESS NOTES
2/18/2019    Bernice Traci  1945  [id number]    Diagnosis  Chief Complaint     Urinary Retention; Benign Prostatic Hypertrophy          Patient presents for sal removal void trial managed by our office    Plan  Patient will hold tamsulosin for now given c/o reaction  Reviewed signs and symptoms of urinary retention and written information provided  Patient will follow up as scheduled in 1 month for a pvr with a provider  He knows to call in the meantime for any difficulty urinating or concerns of incomplete bladder emptying  Procedure Sal removal/voiding trial  Patient with no prior urological history  Acute urinary retention noted 9 days ago and patient presented to the ED  C/o constipation and consumption of alcoholic beverages during the episode  He reports there was difficulty inserting catheter in the ER  He was prescribed tamsulosin last week but only took two doses due to side effects of nasal stuffiness, congestion and neck/chest tightness  Sal catheter removed after deflation of an intact balloon, of note ventral meatal erosion already noted  Patient tolerated well  Encouraged patient to hydrate well and return this afternoon for post void residual   He knows he may return early if uncomfortable and unable to urinate  Patient agrees to this plan  Patient returned this afternoon  Patient states able to void 4 x at home and had a BM  Patient voided 75 ml while in office  Bladder ultrasound performed and PVR measured 79 91ml      Recent Results (from the past 1 hour(s))   POCT Measure PVR    Collection Time: 02/18/19  2:33 PM   Result Value Ref Range    POST-VOID RESIDUAL VOLUME, ML POC 79 91 mL         Vitals:    02/18/19 0751   BP: 140/80   Pulse: 72   Weight: 75 4 kg (166 lb 3 2 oz)   Height: 5' 9" (1 753 m)       LEVON Fabian BSN

## 2019-02-21 ENCOUNTER — TELEPHONE (OUTPATIENT)
Dept: UROLOGY | Facility: MEDICAL CENTER | Age: 74
End: 2019-02-21

## 2019-02-21 NOTE — TELEPHONE ENCOUNTER
Patient managed in the [place] [address] office  Recently seen by Braeden Serna PA-C for urinary retention  Patient passed void trial on Monday, 2/18/19 with PVR of 79 91 ml  Patient calling today with reports of constipation and heart burn  Per patient, he is not having any trouble urinating  Feels empty after voiding  Patient denies taking anything to help relieve constipation  Instructed patient on importance of resolving constipation, as this may affect urination  Patient instructed to start taking stool softeners, if not relief with just stool softeners, should add laxative  Patient aware both can be purchased OTC  Patient also instructed on importance of proper hydration  Patient instructed to follow up with PCP regarding heartburn

## 2019-02-22 ENCOUNTER — OFFICE VISIT (OUTPATIENT)
Dept: FAMILY MEDICINE CLINIC | Facility: CLINIC | Age: 74
End: 2019-02-22
Payer: MEDICARE

## 2019-02-22 VITALS
HEIGHT: 69 IN | OXYGEN SATURATION: 96 % | HEART RATE: 80 BPM | BODY MASS INDEX: 24.65 KG/M2 | RESPIRATION RATE: 18 BRPM | DIASTOLIC BLOOD PRESSURE: 80 MMHG | TEMPERATURE: 98.1 F | SYSTOLIC BLOOD PRESSURE: 124 MMHG | WEIGHT: 166.4 LBS

## 2019-02-22 DIAGNOSIS — K21.9 GASTROESOPHAGEAL REFLUX DISEASE, ESOPHAGITIS PRESENCE NOT SPECIFIED: Primary | ICD-10-CM

## 2019-02-22 PROBLEM — I73.9 PERIPHERAL ARTERIAL DISEASE (HCC): Status: ACTIVE | Noted: 2018-01-05

## 2019-02-22 PROBLEM — K40.90 INGUINAL HERNIA, RIGHT: Status: ACTIVE | Noted: 2018-01-05

## 2019-02-22 PROCEDURE — 99213 OFFICE O/P EST LOW 20 MIN: CPT | Performed by: INTERNAL MEDICINE

## 2019-02-22 RX ORDER — OMEPRAZOLE 20 MG/1
20 CAPSULE, DELAYED RELEASE ORAL DAILY
Qty: 30 CAPSULE | Refills: 2 | Status: SHIPPED | OUTPATIENT
Start: 2019-02-22 | End: 2020-07-20 | Stop reason: ALTCHOICE

## 2019-02-22 NOTE — PROGRESS NOTES
Assessment/Plan:       Diagnoses and all orders for this visit:    Gastroesophageal reflux disease, esophagitis presence not specified  -     omeprazole (PriLOSEC) 20 mg delayed release capsule; Take 1 capsule (20 mg total) by mouth daily      Yaya Montanez was seen and examined in the office today  He appears to have reflux and we discussed what things to avoid and possible triggers  I am going to give him Omeprazole and have him monitor his symptoms  Otherwise no other changes were made  Subjective:      Patient ID: Bard Kelly is a 68 y o  male  Yaya Montanez is here today with complaints of chest/abdominal burning and tightness  This seemed to start after he experienced urinary retention and catheter was removed  He reports noticing it somewhat daily  He has taken OTC medications with some relief  He notes some bloating  He has been able to eat and drink okay  Denies loss of appetite  He does report that he had beer on Saturday and felt the discomfort then  Heartburn   He reports no abdominal pain, no chest pain, no coughing or no nausea  This is a new problem  The current episode started in the past 7 days  The problem occurs constantly  The problem has been unchanged  The symptoms are aggravated by ETOH and lying down  Risk factors include caffeine use and smoking/tobacco exposure  The following portions of the patient's history were reviewed and updated as appropriate: allergies, current medications, past family history, past medical history, past social history, past surgical history and problem list     Review of Systems   Constitutional: Negative for chills and fever  Respiratory: Negative for cough, chest tightness and shortness of breath  Cardiovascular: Negative for chest pain, palpitations and leg swelling  Gastrointestinal: Negative for abdominal pain, blood in stool, constipation, diarrhea, nausea and vomiting          Tightness, bloating  Recent constipation    Genitourinary: Negative for difficulty urinating and dysuria  Objective:      /80   Pulse 80   Temp 98 1 °F (36 7 °C)   Resp 18   Ht 5' 9" (1 753 m)   Wt 75 5 kg (166 lb 6 4 oz)   SpO2 96%   BMI 24 57 kg/m²          Physical Exam   Constitutional: He is oriented to person, place, and time  He appears well-developed and well-nourished  No distress  HENT:   Head: Normocephalic and atraumatic  Eyes: Conjunctivae and EOM are normal  Right eye exhibits no discharge  Left eye exhibits no discharge  No scleral icterus  Neck: Normal range of motion  Cardiovascular: Normal rate, regular rhythm and normal heart sounds  No murmur heard  Pulmonary/Chest: Effort normal and breath sounds normal  No respiratory distress  He has no wheezes  Abdominal: Bowel sounds are normal  There is no tenderness  There is no guarding  Musculoskeletal: Normal range of motion  He exhibits no edema  Lymphadenopathy:     He has no cervical adenopathy  Neurological: He is alert and oriented to person, place, and time  Skin: Skin is warm and dry  He is not diaphoretic  No erythema  Psychiatric: He has a normal mood and affect  His speech is normal and behavior is normal  Judgment and thought content normal    Vitals reviewed

## 2019-03-05 ENCOUNTER — TELEPHONE (OUTPATIENT)
Dept: UROLOGY | Facility: CLINIC | Age: 74
End: 2019-03-05

## 2019-03-05 DIAGNOSIS — N45.1 EPIDIDYMITIS: ICD-10-CM

## 2019-03-05 DIAGNOSIS — S39.91XA INJURY OF GROIN, INITIAL ENCOUNTER: Primary | ICD-10-CM

## 2019-03-05 NOTE — TELEPHONE ENCOUNTER
Patient needs a call back due to left testicle being twice the size and left groin pain  The pain is moderate  Please call back

## 2019-03-05 NOTE — TELEPHONE ENCOUNTER
Recommend the patient obtain a scrotal U/S  In the meantime recommend scrotal support and ibuprofen

## 2019-03-05 NOTE — TELEPHONE ENCOUNTER
Spoke with patient  Advised patient of scrotal ultrasound order  Will call central scheduling to arrange appointment for patient  Also instructed patient on ibuprofen use and scrotal support  Patient verbalized understanding  Patient scheduled for ultrasound tomorrow, 3/6/19 at [facility] for 1:30  Patient aware of time and location of appointment  Patient also provided with ER precautions       Patient knows office will call with U/S results

## 2019-03-05 NOTE — TELEPHONE ENCOUNTER
Patient managed in [address] office  Last seen on 2/18/19 for successful void trial      Patient calling today with reports of pain and swelling to left testicle  Patient unsure if related to shoveling snow yesterday  Per patient he felt a "tear" last night in left testicle  Patient reports since, testicle has become swollen, currently twice the size of the right side  Patient also reports left testicle is firm to touch  Rates current pain at 7/10  Patient denies any fever or any difficulty urinating  Denies any bruising or color changes to skin  Advised patient, will discuss with provider regarding further orders  Will call him back with recommendations  Patient verbalized understanding       Of note patient has pending follow up appointment on 3/18/19

## 2019-03-06 ENCOUNTER — HOSPITAL ENCOUNTER (OUTPATIENT)
Dept: ULTRASOUND IMAGING | Facility: HOSPITAL | Age: 74
Discharge: HOME | End: 2019-03-06
Payer: MEDICARE

## 2019-03-06 ENCOUNTER — OFFICE VISIT (OUTPATIENT)
Dept: CARDIOLOGY CLINIC | Facility: CLINIC | Age: 74
End: 2019-03-06
Payer: MEDICARE

## 2019-03-06 VITALS
SYSTOLIC BLOOD PRESSURE: 110 MMHG | HEART RATE: 72 BPM | HEIGHT: 69 IN | DIASTOLIC BLOOD PRESSURE: 72 MMHG | WEIGHT: 165 LBS | BODY MASS INDEX: 24.44 KG/M2

## 2019-03-06 DIAGNOSIS — I25.10 CORONARY ARTERY DISEASE INVOLVING NATIVE HEART, ANGINA PRESENCE UNSPECIFIED, UNSPECIFIED VESSEL OR LESION TYPE: Primary | ICD-10-CM

## 2019-03-06 DIAGNOSIS — S39.91XA INJURY OF GROIN, INITIAL ENCOUNTER: ICD-10-CM

## 2019-03-06 DIAGNOSIS — E78.5 DYSLIPIDEMIA: ICD-10-CM

## 2019-03-06 PROCEDURE — 99214 OFFICE O/P EST MOD 30 MIN: CPT | Performed by: INTERNAL MEDICINE

## 2019-03-06 PROCEDURE — 93000 ELECTROCARDIOGRAM COMPLETE: CPT | Performed by: INTERNAL MEDICINE

## 2019-03-06 PROCEDURE — 76870 US EXAM SCROTUM: CPT

## 2019-03-06 RX ORDER — LEVOFLOXACIN 250 MG/1
500 TABLET ORAL EVERY 24 HOURS
Qty: 20 TABLET | Refills: 0 | Status: SHIPPED | OUTPATIENT
Start: 2019-03-06 | End: 2019-03-16

## 2019-03-06 RX ORDER — EZETIMIBE 10 MG/1
10 TABLET ORAL DAILY
Qty: 90 TABLET | Refills: 4 | Status: SHIPPED | OUTPATIENT
Start: 2019-03-06 | End: 2020-04-17

## 2019-03-06 NOTE — TELEPHONE ENCOUNTER
U/s revealing epididymoorchitis, Levquin 500mg daily x 10 days sent to pharmacy  Can keep 3/18 visit for re-evaluation

## 2019-03-06 NOTE — TELEPHONE ENCOUNTER
Patient returned phone call  Instructed patient on U/S results per Roberto Bach PA-C  Also instructed patient on Levaquin prescription  Patient instructed to start today  Patient knows to keep follow up appointment on 3/18/19   All questions answered

## 2019-03-06 NOTE — PROGRESS NOTES
Cardiology Follow Up        Camila Miller      1945      [id number]      Discussion/Summary:    1  CAD, prior PCI/ARIK to the left circumflex artery in 2013, setting of NSTEMI  2  Dyslipidemia  3  Hypertension  4  Peripheral arterial disease with greater than 70% right SFA stenosis on prior Dopplers in 2015, without claudication  5  Moderate aortic insufficiency    · No symptoms of angina, continue aspirin  ECG appears changed compared to last year, now with evidence of prior inferior infarction and nonspecific ST and T-wave abnormality  Interestingly, patient without symptoms and denies exertional symptoms upon repeated questioning  Will recheck echocardiogram   Patient to call if any symptoms arise  · Blood pressure controlled continue losartan and metoprolol  · Lipid panel suboptimal with  mg/dL 03/01/2019  Will add ezetimibe 10 mg daily and recheck lipid panel with other health maintenance blood work per PCP  · Follow-up with urology for scrotal swelling and pain  · Will recheck arterial Dopplers after next visit, patient currently without any claudication      Follow-up in 6 months, patient requested to call if any changes in symptoms        Interval History: This is a very pleasant 50-year-old male with a history of CAD status post PCI/ARIK to the mid circumflex in 2013, setting of NSTEMI, as well as a history of hypertension and dyslipidemia  He followed at the [address] [place] [address] up until 01/2018 when he transition to our practice  Prior nuclear stress test 06/2013 after left circumflex stenting reported mid to distal inferolateral scar with mild heath-infarct ischemia  Ejection fraction has been within normal limits by prior echocardiogram 06/2018 without regional wall motion abnormality and moderate aortic insufficiency  Prior lower extremity arterial Dopplers in 2015 revealed greater than 75% stenosis of the right distal SFA    Earlier this week while he was shoveling snow, he had scrotal pain and acute diaphoresis with subsequent scrotal swelling  He is scheduled for a scrotal ultrasound in the near future and follows Urology  In February 2018 he was started on omeprazole for symptoms of GERD, which she states improved 1 day after starting the medication  From a cardiac standpoint he feels well without exertional chest pain, shortness of breath, dizziness, palpitations, lower extremity edema, orthopnea  He is tolerating his medications well  Prior lipid panel 03/01/2019 revealed  mg/dL  Vitals:  Vitals:    03/06/19 0933   BP: 110/72   BP Location: Right arm   Patient Position: Sitting   Cuff Size: Standard   Pulse: 72   Weight: 74 8 kg (165 lb)   Height: 5' 9" (1 753 m)         Past Medical History:   Diagnosis Date    Coronary artery disease     Full dentures     upper and lower    GERD (gastroesophageal reflux disease)     Hyperlipidemia     Hypertension     Inguinal hernia 02/2018    right side    Myocardial infarction ([address] )     Pneumonia     Sciatica     Wears glasses      Social History     Socioeconomic History    Marital status: /Civil Union     Spouse name: Not on file    Number of children: Not on file    Years of education: Not on file    Highest education level: Not on file   Occupational History    Not on file   Social Needs    Financial resource strain: Not on file    Food insecurity:     Worry: Not on file     Inability: Not on file    Transportation needs:     Medical: Not on file     Non-medical: Not on file   Tobacco Use    Smoking status: Current Every Day Smoker     Packs/day: 0 50     Years: 40 00     Pack years: 20 00    Smokeless tobacco: Never Used   Substance and Sexual Activity    Alcohol use:  Yes     Alcohol/week: 6 0 oz     Types: 10 Cans of beer per week    Drug use: No    Sexual activity: Not on file   Lifestyle    Physical activity:     Days per week: Not on file Minutes per session: Not on file    Stress: Not on file   Relationships    Social connections:     Talks on phone: Not on file     Gets together: Not on file     Attends [employer]ism service: Not on file     Active member of club or organization: Not on file     Attends meetings of clubs or organizations: Not on file     Relationship status: Not on file    Intimate partner violence:     Fear of current or ex partner: Not on file     Emotionally abused: Not on file     Physically abused: Not on file     Forced sexual activity: Not on file   Other Topics Concern    Not on file   Social History Narrative    Not on file      Family History   Problem Relation Age of Onset    Gallbladder disease Mother      Past Surgical History:   Procedure Laterality Date    APPENDECTOMY      CORONARY ANGIOPLASTY WITH STENT PLACEMENT  05/2013    x1    HERNIA REPAIR Left     inguinal hernia     LAPAROSCOPIC INGUINAL HERNIA REPAIR Right 02/08/2018    ROBOTIC REPAIR WITH MESH-MANASA TAPIA MD    TONSILLECTOMY      WISDOM TOOTH EXTRACTION         Current Outpatient Medications:     aspirin 81 mg chewable tablet, Chew 81 mg daily, Disp: , Rfl:     atorvastatin (LIPITOR) 80 mg tablet, Take 1 tablet (80 mg total) by mouth daily, Disp: 30 tablet, Rfl: 11    calcium carbonate (CALCIUM 600) 600 MG tablet, Take 600 mg by mouth daily, Disp: , Rfl:     cholecalciferol (VITAMIN D3) 1,000 units tablet, Take 1,000 Units by mouth daily, Disp: , Rfl:     losartan (COZAAR) 25 mg tablet, Take 1 tablet (25 mg total) by mouth daily, Disp: 30 tablet, Rfl: 5    metoprolol succinate (TOPROL-XL) 100 mg 24 hr tablet, Take 1 tablet (100 mg total) by mouth daily, Disp: 30 tablet, Rfl: 11    omeprazole (PriLOSEC) 20 mg delayed release capsule, Take 1 capsule (20 mg total) by mouth daily, Disp: 30 capsule, Rfl: 2    ezetimibe (ZETIA) 10 mg tablet, Take 1 tablet (10 mg total) by mouth daily, Disp: 90 tablet, Rfl: 4    tamsulosin (FLOMAX) 0 4 mg, Take 1 capsule (0 4 mg total) by mouth daily with dinner (Patient not taking: Reported on 2/22/2019), Disp: 30 capsule, Rfl: 11        Review of Systems:  Review of Systems   Respiratory: Negative  Cardiovascular: Negative  Genitourinary: Positive for scrotal swelling and testicular pain  All other systems reviewed and are negative  Physical Exam:  Physical Exam   Constitutional: He is oriented to person, place, and time  He appears well-developed and well-nourished  No distress  HENT:   Head: Normocephalic and atraumatic  Eyes: Pupils are equal, round, and reactive to light  EOM are normal  Right eye exhibits no discharge  No scleral icterus  Neck: Normal range of motion  Neck supple  No thyromegaly present  Cardiovascular: Normal rate, regular rhythm and normal heart sounds  Exam reveals no gallop and no friction rub  No murmur heard  Pulmonary/Chest: Effort normal and breath sounds normal    Abdominal: He exhibits no distension  There is no tenderness  There is no rebound and no guarding  Musculoskeletal: Normal range of motion  He exhibits no edema  Neurological: He is alert and oriented to person, place, and time  Coordination normal    Skin: Skin is warm and dry  No rash noted  He is not diaphoretic  No erythema  No pallor  Psychiatric: He has a normal mood and affect  His behavior is normal  Judgment and thought content normal        This note was completed in part utilizing [employer] Direct Software  Grammatical errors, random word insertions, spelling mistakes, and incomplete sentences can be an occasional consequence of this system secondary to software limitations, ambient noise, and hardware issues  If you have any questions or concerns about the content, text, or information contained within the body of this dictation, please contact the provider for clarification

## 2019-03-08 ENCOUNTER — TELEPHONE (OUTPATIENT)
Dept: CARDIOLOGY CLINIC | Facility: CLINIC | Age: 74
End: 2019-03-08

## 2019-03-08 NOTE — TELEPHONE ENCOUNTER
Called pt and left central scheduling phone number to call for an echo and keep his apt in Sept  [facility]----- Message from Sherly Soni, DO sent at 3/6/2019 10:40 AM EST -----  Jordan Paiz,   Can you please schedule an echo for this pt and let him know? Can keep 6 month f/u for now    Thx    RP

## 2019-03-15 NOTE — PROGRESS NOTES
3/18/2019      Chief Complaint   Patient presents with    Urinary Retention       Assessment and Plan    68 y o  male     1  Urinary retention  - PVR 0mL   - restart tamsulosin, discussed with the patient that if he has return of chest burning and/or any discomfort he is to discontinue the medication and notify our office    2  Epididymoorchitis  - U/S confirmed epididymoorchitis, was given Levquin 500mg daily x 10 days from 3/6 to 3/16  - discussed with the patient that incomplete emptying can be the etiology of his epididymal orchitis, will monitor for any signs and symptoms of recurrence    Follow up 3 months for symptom reassessment and postvoid residual testing      History of Present Illness  Shiv Koehler is a 68 y o  male here for follow up evaluation of history of urinary retention  The patient underwent a void trial 2/18/2019 for which he passed  PVR at this time was 79mL  He presents today for follow-up of his postvoid residual as well as a recent episode of epididymoorchitis  The patient noticed swelling of his left testicle and had pain  An ultrasound was obtained and confirmed epididymoorchitis  He was treated with Levaquin 500 mg daily times 10 days  He states that since completing this antibiotic he is feeling much better  He was previously on tamsulosin but noticed after a couple doses of this some burning burning within his chest   He therefore discontinued the tamsulosin is not currently on this  His PVR in the office today 0 mL  Review of Systems   Constitutional: Negative for activity change, chills and fever  Gastrointestinal: Negative for abdominal distention and abdominal pain  Musculoskeletal: Negative for back pain and gait problem  Psychiatric/Behavioral: Negative for behavioral problems and confusion  Urinary Incontinence Screening      Most Recent Value   Urinary Incontinence   Urinary Incontinence? No   Incomplete emptying? No   Urinary frequency?   No Urinary urgency? No   Urinary hesitancy? No   Dysuria (painful difficult urination)? No   Nocturia (waking up to use the bathroom)? No   Straining (having to push to go)? No   Weak stream?  No   Intermittent stream?  No   Post void dribbling? Yes            Past Medical History  Past Medical History:   Diagnosis Date    Coronary artery disease     Full dentures     upper and lower    GERD (gastroesophageal reflux disease)     Hyperlipidemia     Hypertension     Inguinal hernia 02/2018    right side    Myocardial infarction ([address] )     Pneumonia     Sciatica     Wears glasses        Past Social History  Past Surgical History:   Procedure Laterality Date    APPENDECTOMY      CORONARY ANGIOPLASTY WITH STENT PLACEMENT  05/2013    x1    HERNIA REPAIR Left     inguinal hernia     LAPAROSCOPIC INGUINAL HERNIA REPAIR Right 02/08/2018    ROBOTIC REPAIR WITH MESH-MANASA TAPIA MD    TONSILLECTOMY      WISDOM TOOTH EXTRACTION       Social History     Tobacco Use   Smoking Status Current Every Day Smoker    Packs/day: 0 50    Years: 40 00    Pack years: 20 00   Smokeless Tobacco Never Used       Past Family History  Family History   Problem Relation Age of Onset    Gallbladder disease Mother        Past Social history  Social History     Socioeconomic History    Marital status: /Civil Union     Spouse name: Not on file    Number of children: Not on file    Years of education: Not on file    Highest education level: Not on file   Occupational History    Not on file   Social Needs    Financial resource strain: Not on file    Food insecurity:     Worry: Not on file     Inability: Not on file    Transportation needs:     Medical: Not on file     Non-medical: Not on file   Tobacco Use    Smoking status: Current Every Day Smoker     Packs/day: 0 50     Years: 40 00     Pack years: 20 00    Smokeless tobacco: Never Used   Substance and Sexual Activity    Alcohol use:  Yes     Alcohol/week: 6 0 oz     Types: 10 Cans of beer per week    Drug use: No    Sexual activity: Not on file   Lifestyle    Physical activity:     Days per week: Not on file     Minutes per session: Not on file    Stress: Not on file   Relationships    Social connections:     Talks on phone: Not on file     Gets together: Not on file     Attends Islam service: Not on file     Active member of club or organization: Not on file     Attends meetings of clubs or organizations: Not on file     Relationship status: Not on file    Intimate partner violence:     Fear of current or ex partner: Not on file     Emotionally abused: Not on file     Physically abused: Not on file     Forced sexual activity: Not on file   Other Topics Concern    Not on file   Social History Narrative    Not on file       Current Medications  Current Outpatient Medications   Medication Sig Dispense Refill    aspirin 81 mg chewable tablet Chew 81 mg daily      atorvastatin (LIPITOR) 80 mg tablet Take 1 tablet (80 mg total) by mouth daily 30 tablet 11    calcium carbonate (CALCIUM 600) 600 MG tablet Take 600 mg by mouth daily      cholecalciferol (VITAMIN D3) 1,000 units tablet Take 1,000 Units by mouth daily      ezetimibe (ZETIA) 10 mg tablet Take 1 tablet (10 mg total) by mouth daily 90 tablet 4    losartan (COZAAR) 25 mg tablet Take 1 tablet (25 mg total) by mouth daily 30 tablet 5    metoprolol succinate (TOPROL-XL) 100 mg 24 hr tablet Take 1 tablet (100 mg total) by mouth daily 30 tablet 11    omeprazole (PriLOSEC) 20 mg delayed release capsule Take 1 capsule (20 mg total) by mouth daily 30 capsule 2    tamsulosin (FLOMAX) 0 4 mg Take 1 capsule (0 4 mg total) by mouth daily with dinner 30 capsule 11     No current facility-administered medications for this visit          Allergies  No Known Allergies      The following portions of the patient's history were reviewed and updated as appropriate: allergies, current medications, past medical history, past social history, past surgical history and problem list       Vitals  Vitals:    03/18/19 0949   BP: 120/68   Pulse: 72   Weight: 76 2 kg (168 lb)   Height: 5' 9" (1 753 m)       Physical Exam  Constitutional   General appearance: Patient is seated and in no acute distress, well appearing and well nourished  Head and Face   Head and face: Normal     Eyes   Conjunctiva and lids: No erythema, swelling or discharge  Ears, Nose, Mouth, and Throat   Hearing: Normal     Pulmonary   Respiratory effort: No increased work of breathing or signs of respiratory distress  Cardiovascular   Examination of extremities for edema and/or varicosities: Normal     Abdomen   Abdomen: Non-tender, no masses  Genitourinary   Penis circumcised, phallus normal, meatus patent  Testicles descended into scrotum bilaterally without masses nor tenderness, left hydrocele appreciated  Musculoskeletal   Gait and station: Normal     Skin   Skin and subcutaneous tissue: Warm, dry, and intact  No visible lesions or rashes    Psychiatric   Judgment and insight: Normal  Recent and remote memory:  Normal  Mood and affect: Normal       Results  Recent Results (from the past 1 hour(s))   POCT Measure PVR    Collection Time: 03/18/19  9:56 AM   Result Value Ref Range    POST-VOID RESIDUAL VOLUME, ML POC 0 mL   ]  Lab Results   Component Value Date    PSA 3 5 01/24/2018     Lab Results   Component Value Date    CALCIUM 8 8 02/09/2019    K 4 1 02/09/2019    CO2 24 02/09/2019     02/09/2019    BUN 11 02/09/2019    CREATININE 0 79 02/09/2019     Lab Results   Component Value Date    WBC 13 35 (H) 01/24/2018    HGB 17 9 (H) 01/24/2018    HCT 51 8 (H) 01/24/2018    MCV 99 (H) 01/24/2018     01/24/2018       Orders  Orders Placed This Encounter   Procedures    POCT Measure PVR

## 2019-03-18 ENCOUNTER — OFFICE VISIT (OUTPATIENT)
Dept: UROLOGY | Facility: CLINIC | Age: 74
End: 2019-03-18
Payer: MEDICARE

## 2019-03-18 VITALS
HEIGHT: 69 IN | HEART RATE: 72 BPM | DIASTOLIC BLOOD PRESSURE: 68 MMHG | WEIGHT: 168 LBS | BODY MASS INDEX: 24.88 KG/M2 | SYSTOLIC BLOOD PRESSURE: 120 MMHG

## 2019-03-18 DIAGNOSIS — R33.9 URINARY RETENTION: Primary | ICD-10-CM

## 2019-03-18 LAB — POST-VOID RESIDUAL VOLUME, ML POC: 0 ML

## 2019-03-18 PROCEDURE — 99213 OFFICE O/P EST LOW 20 MIN: CPT | Performed by: PHYSICIAN ASSISTANT

## 2019-03-18 PROCEDURE — 51798 US URINE CAPACITY MEASURE: CPT | Performed by: PHYSICIAN ASSISTANT

## 2019-04-18 DIAGNOSIS — I10 BENIGN ESSENTIAL HTN: ICD-10-CM

## 2019-04-18 DIAGNOSIS — I25.10 CORONARY ARTERY DISEASE INVOLVING NATIVE CORONARY ARTERY OF NATIVE HEART WITHOUT ANGINA PECTORIS: ICD-10-CM

## 2019-04-18 RX ORDER — LOSARTAN POTASSIUM 25 MG/1
TABLET ORAL
Qty: 30 TABLET | Refills: 5 | Status: SHIPPED | OUTPATIENT
Start: 2019-04-18 | End: 2020-06-19

## 2019-07-13 DIAGNOSIS — I25.10 CORONARY ARTERY DISEASE INVOLVING NATIVE CORONARY ARTERY OF NATIVE HEART WITHOUT ANGINA PECTORIS: ICD-10-CM

## 2019-07-13 DIAGNOSIS — E78.5 DYSLIPIDEMIA: ICD-10-CM

## 2019-07-16 RX ORDER — ATORVASTATIN CALCIUM 80 MG/1
TABLET, FILM COATED ORAL
Qty: 30 TABLET | Refills: 11 | Status: SHIPPED | OUTPATIENT
Start: 2019-07-16 | End: 2020-08-24

## 2019-08-04 DIAGNOSIS — I25.10 CORONARY ARTERY DISEASE INVOLVING NATIVE CORONARY ARTERY OF NATIVE HEART WITHOUT ANGINA PECTORIS: ICD-10-CM

## 2019-08-05 RX ORDER — METOPROLOL SUCCINATE 100 MG/1
TABLET, EXTENDED RELEASE ORAL
Qty: 30 TABLET | Refills: 11 | Status: SHIPPED | OUTPATIENT
Start: 2019-08-05 | End: 2020-08-24

## 2019-09-06 ENCOUNTER — HOSPITAL ENCOUNTER (OUTPATIENT)
Dept: NON INVASIVE DIAGNOSTICS | Facility: CLINIC | Age: 74
Discharge: HOME | End: 2019-09-06
Payer: MEDICARE

## 2019-09-06 DIAGNOSIS — I25.10 CORONARY ARTERY DISEASE INVOLVING NATIVE HEART, ANGINA PRESENCE UNSPECIFIED, UNSPECIFIED VESSEL OR LESION TYPE: ICD-10-CM

## 2019-09-06 DIAGNOSIS — E78.5 DYSLIPIDEMIA: ICD-10-CM

## 2019-09-06 PROCEDURE — 93306 TTE W/DOPPLER COMPLETE: CPT | Performed by: INTERNAL MEDICINE

## 2019-09-06 PROCEDURE — 93306 TTE W/DOPPLER COMPLETE: CPT

## 2019-09-11 ENCOUNTER — OFFICE VISIT (OUTPATIENT)
Dept: CARDIOLOGY CLINIC | Facility: CLINIC | Age: 74
End: 2019-09-11
Payer: MEDICARE

## 2019-09-11 VITALS
DIASTOLIC BLOOD PRESSURE: 72 MMHG | SYSTOLIC BLOOD PRESSURE: 118 MMHG | WEIGHT: 159.6 LBS | BODY MASS INDEX: 23.64 KG/M2 | HEIGHT: 69 IN | HEART RATE: 61 BPM

## 2019-09-11 DIAGNOSIS — I25.10 CORONARY ARTERY DISEASE INVOLVING NATIVE CORONARY ARTERY OF NATIVE HEART WITHOUT ANGINA PECTORIS: Primary | ICD-10-CM

## 2019-09-11 DIAGNOSIS — R09.89 BRUIT OF RIGHT CAROTID ARTERY: ICD-10-CM

## 2019-09-11 PROCEDURE — 99214 OFFICE O/P EST MOD 30 MIN: CPT | Performed by: INTERNAL MEDICINE

## 2019-09-11 NOTE — PROGRESS NOTES
Cardiology Follow Up        Michael Farley      1945      [id number]      Discussion/Summary:    1  CAD, prior PCI/ARIK to the left circumflex artery in 2013, setting of NSTEMI  2  Dyslipidemia  3  Hypertension  4  Peripheral arterial disease with greater than 70% right SFA stenosis on prior Dopplers in 2015, without claudication  5  Moderate aortic insufficiency    · No symptoms of angina  Echocardiogram 09/06/2019 with evidence of probable basal inferior hypokinesis with prior ECG revealing the same  This possibly may be due to left circumflex in stent restenosis  Fortunately, he does not have symptoms, therefore I will recommend aggressive medical management  I have implored him to stop smoking  I will recheck his lipid panel in the near future to ensure optimal control with goal LDL cholesterol less than 70 mg/dL  Atorvastatin may be changed to rosuvastatin if we are not yet at goal  · Blood pressure well controlled, continue losartan and metoprolol  · Possible faint right carotid bruit, will check carotid Dopplers and abdominal aortic ultrasound to screen for aneurysm    Follow-up in 4 months      Interval History: This is a very pleasant 60-year-old male with a history of CAD status post PCI/ARIK to the mid circumflex in 2013, setting of NSTEMI, as well as a history of hypertension and dyslipidemia  He followed at the [address] [place] [address] up until 01/2018 when he transition to our practice  Prior nuclear stress test 06/2013 after left circumflex stenting reported mid to distal inferolateral scar with mild haeth-infarct ischemia  Ejection fraction has been within normal limits by prior echocardiogram 06/2018 without regional wall motion abnormality and moderate aortic insufficiency  Prior lower extremity arterial Dopplers in 2015 revealed greater than 75% stenosis of the right distal SFA  During his prior visit 03/2018, he was doing well    He has had med was added for better lipid control as his LDL remains elevated at 110 mg/dL  ECG revealed evidence of inferior myocardial infarction, and he underwent an echocardiogram 09/06/2019 which revealed ejection fraction 55% with possible basal inferior hypokinesis  He presents today for follow-up  Continues to smoke 7-8 cigarretes on a daily basis  He denies exertional chest pain, shortness of breath, dizziness, palpitations, lower extremity edema  Vitals:  Vitals:    09/11/19 0756   BP: 118/72   BP Location: Right arm   Patient Position: Sitting   Cuff Size: Standard   Pulse: 61   Weight: 72 4 kg (159 lb 9 6 oz)   Height: 5' 9" (1 753 m)         Past Medical History:   Diagnosis Date    Coronary artery disease     Full dentures     upper and lower    GERD (gastroesophageal reflux disease)     Hyperlipidemia     Hypertension     Inguinal hernia 02/2018    right side    Myocardial infarction ([address]     Pneumonia     Sciatica     Wears glasses      Social History     Socioeconomic History    Marital status: /Civil Union     Spouse name: Not on file    Number of children: Not on file    Years of education: Not on file    Highest education level: Not on file   Occupational History    Not on file   Social Needs    Financial resource strain: Not on file    Food insecurity:     Worry: Not on file     Inability: Not on file    Transportation needs:     Medical: Not on file     Non-medical: Not on file   Tobacco Use    Smoking status: Current Every Day Smoker     Packs/day: 0 50     Years: 40 00     Pack years: 20 00    Smokeless tobacco: Never Used   Substance and Sexual Activity    Alcohol use:  Yes     Alcohol/week: 10 0 standard drinks     Types: 10 Cans of beer per week    Drug use: No    Sexual activity: Not on file   Lifestyle    Physical activity:     Days per week: Not on file     Minutes per session: Not on file    Stress: Not on file   Relationships    Social connections:     Talks on phone: Not on file     Gets together: Not on file     Attends Holiness service: Not on file     Active member of club or organization: Not on file     Attends meetings of clubs or organizations: Not on file     Relationship status: Not on file    Intimate partner violence:     Fear of current or ex partner: Not on file     Emotionally abused: Not on file     Physically abused: Not on file     Forced sexual activity: Not on file   Other Topics Concern    Not on file   Social History Narrative    Not on file      Family History   Problem Relation Age of Onset    Gallbladder disease Mother      Past Surgical History:   Procedure Laterality Date    APPENDECTOMY      CORONARY ANGIOPLASTY WITH STENT PLACEMENT  05/2013    x1    HERNIA REPAIR Left     inguinal hernia     LAPAROSCOPIC INGUINAL HERNIA REPAIR Right 02/08/2018    ROBOTIC REPAIR WITH MESH-MANASA TAPIA MD    TONSILLECTOMY      WISDOM TOOTH EXTRACTION         Current Outpatient Medications:     aspirin 81 mg chewable tablet, Chew 81 mg daily, Disp: , Rfl:     atorvastatin (LIPITOR) 80 mg tablet, TAKE 1 TABLET BY MOUTH EVERY DAY, Disp: 30 tablet, Rfl: 11    calcium carbonate (CALCIUM 600) 600 MG tablet, Take 600 mg by mouth daily, Disp: , Rfl:     cholecalciferol (VITAMIN D3) 1,000 units tablet, Take 1,000 Units by mouth daily, Disp: , Rfl:     ezetimibe (ZETIA) 10 mg tablet, Take 1 tablet (10 mg total) by mouth daily, Disp: 90 tablet, Rfl: 4    losartan (COZAAR) 25 mg tablet, TAKE 1 TABLET BY MOUTH EVERY DAY, Disp: 30 tablet, Rfl: 5    metoprolol succinate (TOPROL-XL) 100 mg 24 hr tablet, TAKE 1 TABLET BY MOUTH EVERY DAY, Disp: 30 tablet, Rfl: 11    omeprazole (PriLOSEC) 20 mg delayed release capsule, Take 1 capsule (20 mg total) by mouth daily, Disp: 30 capsule, Rfl: 2    tamsulosin (FLOMAX) 0 4 mg, Take 1 capsule (0 4 mg total) by mouth daily with dinner, Disp: 30 capsule, Rfl: 11        Review of Systems:  Review of Systems   Respiratory: Negative  Cardiovascular: Negative  All other systems reviewed and are negative  Physical Exam:  Physical Exam   Constitutional: He is oriented to person, place, and time  He appears well-developed and well-nourished  No distress  HENT:   Head: Normocephalic and atraumatic  Eyes: Pupils are equal, round, and reactive to light  EOM are normal  Right eye exhibits no discharge  No scleral icterus  Neck: Normal range of motion  Neck supple  No thyromegaly present  Cardiovascular: Normal rate, regular rhythm and normal heart sounds  Exam reveals no gallop and no friction rub  No murmur heard  Faint R carotid bruit   Pulmonary/Chest: Effort normal and breath sounds normal    Abdominal: He exhibits no distension  There is no tenderness  There is no rebound and no guarding  Musculoskeletal: Normal range of motion  He exhibits no edema  Neurological: He is alert and oriented to person, place, and time  Coordination normal    Skin: Skin is warm and dry  No rash noted  He is not diaphoretic  No erythema  No pallor  Psychiatric: He has a normal mood and affect  His behavior is normal  Judgment and thought content normal        This note was completed in part utilizing M-Modal Flu[employer] Direct Software  Grammatical errors, random word insertions, spelling mistakes, and incomplete sentences can be an occasional consequence of this system secondary to software limitations, ambient noise, and hardware issues  If you have any questions or concerns about the content, text, or information contained within the body of this dictation, please contact the provider for clarification

## 2019-10-01 ENCOUNTER — HOSPITAL ENCOUNTER (OUTPATIENT)
Dept: ULTRASOUND IMAGING | Facility: HOSPITAL | Age: 74
Discharge: HOME | End: 2019-10-01
Attending: INTERNAL MEDICINE
Payer: MEDICARE

## 2019-10-01 ENCOUNTER — HOSPITAL ENCOUNTER (OUTPATIENT)
Dept: NON INVASIVE DIAGNOSTICS | Facility: HOSPITAL | Age: 74
Discharge: HOME | End: 2019-10-01
Attending: INTERNAL MEDICINE
Payer: MEDICARE

## 2019-10-01 DIAGNOSIS — R09.89 BRUIT OF RIGHT CAROTID ARTERY: ICD-10-CM

## 2019-10-01 DIAGNOSIS — I25.10 CORONARY ARTERY DISEASE INVOLVING NATIVE CORONARY ARTERY OF NATIVE HEART WITHOUT ANGINA PECTORIS: ICD-10-CM

## 2019-10-01 PROCEDURE — 93880 EXTRACRANIAL BILAT STUDY: CPT | Performed by: SURGERY

## 2019-10-01 PROCEDURE — 93880 EXTRACRANIAL BILAT STUDY: CPT

## 2019-10-01 PROCEDURE — 76775 US EXAM ABDO BACK WALL LIM: CPT

## 2019-11-09 ENCOUNTER — HOSPITAL ENCOUNTER (EMERGENCY)
Facility: HOSPITAL | Age: 74
Discharge: HOME | End: 2019-11-10
Attending: EMERGENCY MEDICINE | Admitting: EMERGENCY MEDICINE
Payer: MEDICARE

## 2019-11-09 VITALS
OXYGEN SATURATION: 97 % | WEIGHT: 164.68 LBS | DIASTOLIC BLOOD PRESSURE: 75 MMHG | HEART RATE: 90 BPM | BODY MASS INDEX: 24.32 KG/M2 | SYSTOLIC BLOOD PRESSURE: 150 MMHG | TEMPERATURE: 97.2 F | RESPIRATION RATE: 20 BRPM

## 2019-11-09 DIAGNOSIS — R33.8 ACUTE URINARY RETENTION: Primary | ICD-10-CM

## 2019-11-09 LAB
BILIRUB UR QL STRIP: NEGATIVE
CLARITY UR: CLEAR
COLOR UR: YELLOW
GLUCOSE UR STRIP-MCNC: NEGATIVE MG/DL
HGB UR QL STRIP.AUTO: ABNORMAL
KETONES UR STRIP-MCNC: NEGATIVE MG/DL
LEUKOCYTE ESTERASE UR QL STRIP: NEGATIVE
NITRITE UR QL STRIP: NEGATIVE
PH UR STRIP.AUTO: 5.5 [PH] (ref 4.5–8)
PROT UR STRIP-MCNC: NEGATIVE MG/DL
SP GR UR STRIP.AUTO: <=1.005 (ref 1–1.03)
UROBILINOGEN UR QL STRIP.AUTO: 0.2 E.U./DL

## 2019-11-09 PROCEDURE — 99283 EMERGENCY DEPT VISIT LOW MDM: CPT

## 2019-11-09 PROCEDURE — 81001 URINALYSIS AUTO W/SCOPE: CPT

## 2019-11-09 PROCEDURE — 51702 INSERT TEMP BLADDER CATH: CPT | Performed by: EMERGENCY MEDICINE

## 2019-11-09 PROCEDURE — 51798 US URINE CAPACITY MEASURE: CPT

## 2019-11-09 PROCEDURE — 99283 EMERGENCY DEPT VISIT LOW MDM: CPT | Performed by: EMERGENCY MEDICINE

## 2019-11-09 RX ORDER — ONDANSETRON 4 MG/1
4 TABLET, ORALLY DISINTEGRATING ORAL ONCE
Status: DISCONTINUED | OUTPATIENT
Start: 2019-11-09 | End: 2019-11-10 | Stop reason: HOSPADM

## 2019-11-09 RX ORDER — LIDOCAINE HYDROCHLORIDE 20 MG/ML
1 JELLY TOPICAL ONCE
Status: COMPLETED | OUTPATIENT
Start: 2019-11-09 | End: 2019-11-09

## 2019-11-09 RX ADMIN — LIDOCAINE HYDROCHLORIDE 1 APPLICATION: 20 JELLY TOPICAL at 23:33

## 2019-11-10 LAB
BACTERIA UR QL AUTO: ABNORMAL /HPF
NON-SQ EPI CELLS URNS QL MICRO: ABNORMAL /HPF
RBC #/AREA URNS AUTO: ABNORMAL /HPF
WBC #/AREA URNS AUTO: ABNORMAL /HPF

## 2019-11-10 PROCEDURE — 87086 URINE CULTURE/COLONY COUNT: CPT | Performed by: EMERGENCY MEDICINE

## 2019-11-10 NOTE — ED PROVIDER NOTES
History  Chief Complaint   Patient presents with    Urinary Retention     urinary retention starting today  last void was this am  c/o pressure  states this happened approx 2 years ago and needed catheter  68 y o  M w/h/o urinary retention (had to have sal catheter placed and followed up with urology) p/w urinary retention x this evening  Having suprapubic pressure and nausea  History provided by:  Patient   used: No    Difficulty Urinating   Presenting symptoms: no dysuria    Relieved by:  None tried  Worsened by:  Nothing  Ineffective treatments:  None tried  Associated symptoms: abdominal pain (Suprapubic pressure), nausea and urinary retention    Associated symptoms: no diarrhea, no fever, no flank pain, no hematuria, no urinary frequency and no vomiting    Risk factors comment:  H/o urinary retention      Prior to Admission Medications   Prescriptions Last Dose Informant Patient Reported?  Taking?   aspirin 81 mg chewable tablet  Self Yes No   Sig: Chew 81 mg daily   atorvastatin (LIPITOR) 80 mg tablet   No No   Sig: TAKE 1 TABLET BY MOUTH EVERY DAY   calcium carbonate (CALCIUM 600) 600 MG tablet  Self Yes No   Sig: Take 600 mg by mouth daily   cholecalciferol (VITAMIN D3) 1,000 units tablet  Self Yes No   Sig: Take 1,000 Units by mouth daily   ezetimibe (ZETIA) 10 mg tablet   No No   Sig: Take 1 tablet (10 mg total) by mouth daily   losartan (COZAAR) 25 mg tablet   No No   Sig: TAKE 1 TABLET BY MOUTH EVERY DAY   metoprolol succinate (TOPROL-XL) 100 mg 24 hr tablet   No No   Sig: TAKE 1 TABLET BY MOUTH EVERY DAY   omeprazole (PriLOSEC) 20 mg delayed release capsule  Self No No   Sig: Take 1 capsule (20 mg total) by mouth daily   tamsulosin (FLOMAX) 0 4 mg  Self No No   Sig: Take 1 capsule (0 4 mg total) by mouth daily with dinner      Facility-Administered Medications: None       Past Medical History:   Diagnosis Date    Coronary artery disease     Full dentures     upper and lower    GERD (gastroesophageal reflux disease)     Hyperlipidemia     Hypertension     Inguinal hernia 02/2018    right side    Myocardial infarction ([address] )     Pneumonia     Sciatica     Wears glasses        Past Surgical History:   Procedure Laterality Date    APPENDECTOMY      CORONARY ANGIOPLASTY WITH STENT PLACEMENT  05/2013    x1    HERNIA REPAIR Left     inguinal hernia     LAPAROSCOPIC INGUINAL HERNIA REPAIR Right 02/08/2018    ROBOTIC REPAIR WITH MESH-MANASA TAPIA MD    TONSILLECTOMY      WISDOM TOOTH EXTRACTION         Family History   Problem Relation Age of Onset    Gallbladder disease Mother      I have reviewed and agree with the history as documented  Social History     Tobacco Use    Smoking status: Current Every Day Smoker     Packs/day: 0 50     Years: 40 00     Pack years: 20 00    Smokeless tobacco: Never Used   Substance Use Topics    Alcohol use: Yes     Alcohol/week: 10 0 standard drinks     Types: 10 Cans of beer per week     Comment: social     Drug use: No        Review of Systems   Constitutional: Negative for chills and fever  HENT: Negative for rhinorrhea and sore throat  Respiratory: Negative for cough  Gastrointestinal: Positive for abdominal pain (Suprapubic pressure) and nausea  Negative for abdominal distention, anal bleeding, blood in stool, constipation, diarrhea, rectal pain and vomiting  Genitourinary: Positive for difficulty urinating  Negative for dysuria, flank pain, frequency, hematuria and urgency  Musculoskeletal: Negative for back pain  Skin: Negative for pallor and rash  All other systems reviewed and are negative  Physical Exam  Physical Exam   Constitutional: He is oriented to person, place, and time  He appears well-developed and well-nourished  Non-toxic appearance  He does not have a sickly appearance  He does not appear ill  He appears distressed  HENT:   Head: Normocephalic     Mouth/Throat: Oropharynx is clear and moist and mucous membranes are normal    Neck: Normal range of motion  Neck supple  Cardiovascular: Normal rate, regular rhythm, normal heart sounds and intact distal pulses  Exam reveals no gallop and no friction rub  No murmur heard  Pulmonary/Chest: Effort normal and breath sounds normal  No respiratory distress  He has no wheezes  He has no rales  Abdominal: Soft  Normal appearance and bowel sounds are normal  He exhibits no distension and no mass  There is no hepatosplenomegaly  There is tenderness in the suprapubic area  There is no rigidity, no rebound, no guarding, no CVA tenderness, no tenderness at McBurney's point and negative Wharton's sign  Lymphadenopathy:     He has no cervical adenopathy  Neurological: He is alert and oriented to person, place, and time  Skin: Skin is warm, dry and intact  No rash noted  No pallor  Nursing note and vitals reviewed  Vital Signs  ED Triage Vitals [11/09/19 2259]   Temperature Pulse Respirations Blood Pressure SpO2   (!) 97 2 °F (36 2 °C) 90 20 150/75 97 %      Temp Source Heart Rate Source Patient Position - Orthostatic VS BP Location FiO2 (%)   Temporal Monitor Standing Left arm --      Pain Score       8           Vitals:    11/09/19 2259   BP: 150/75   Pulse: 90   Patient Position - Orthostatic VS: Standing         Visual Acuity      ED Medications  Medications   ondansetron (ZOFRAN-ODT) dispersible tablet 4 mg (4 mg Oral Not Given 11/9/19 2347)   lidocaine (GLYDO) 2 % urethral/mucosal gel 1 application (1 application Urethral Given 11/9/19 2333)       Diagnostic Studies  Results Reviewed     Procedure Component Value Units Date/Time    Urine culture [[id number]] Collected:  11/10/19 0001    Lab Status:   In process Specimen:  Urine, Indwelling Gupta Catheter Updated:  11/10/19 0004    Urine Microscopic [[id number]]  (Abnormal) Collected:  11/09/19 2345    Lab Status:  Final result Specimen:  Urine, Indwelling Gupta Catheter Updated:  11/10/19 0002     RBC, UA None Seen /hpf      WBC, UA 1-2 /hpf      Epithelial Cells Occasional /hpf      Bacteria, UA None Seen /hpf     POCT urinalysis dipstick [[id number]]  (Abnormal) Resulted:  11/09/19 2347    Lab Status:  Final result Specimen:  Urine Updated:  11/09/19 2347    ED Urine Macroscopic [[id number]]  (Abnormal) Collected:  11/09/19 2345    Lab Status:  Final result Specimen:  Urine Updated:  11/09/19 2346     Color, UA Yellow     Clarity, UA Clear     pH, UA 5 5     Leukocytes, UA Negative     Nitrite, UA Negative     Protein, UA Negative mg/dl      Glucose, UA Negative mg/dl      Ketones, UA Negative mg/dl      Urobilinogen, UA 0 2 E U /dl      Bilirubin, UA Negative     Blood, UA Moderate     Specific Gravity, UA <=1 005    Narrative:       CLINITEK RESULT                 No orders to display              Procedures  Procedures       ED Course  ED Course as of Nov 10 0100   Sat Nov 09, 2019   2330 Pt went to the bathroom and "peed a lot," however PVR is 493ml, per nurse  Will place sal  Sun Nov 10, 2019   0007 Pt sleeping in room  Woke pt up  Pt states he has relief of symptoms following sal placement  I instructed him to call urology on Monday for an appt for a voiding trial to see if the sal can be removed  Pt agrees  MDM  Number of Diagnoses or Management Options     Amount and/or Complexity of Data Reviewed  Tests in the medicine section of CPT®: ordered and reviewed        Disposition  Final diagnoses:   Acute urinary retention     Time reflects when diagnosis was documented in both MDM as applicable and the Disposition within this note     Time User Action Codes Description Comment    11/10/2019 12:04 AM Delia Moya Add [R33 8] Acute urinary retention       ED Disposition     ED Disposition Condition Date/Time Comment    Discharge Stable [place] Nov 10, 2019 12:04 AM Nasim Hugo discharge to home/self care              Follow-up Information Follow up With Specialties Details Why [address] [facility] [place] [facility] Schedule an appointment as soon as possible for a visit  Please call on Monday to make an appointment to have a voiding trial to see if the catheter can be removed [place] [phone]  [phone]  [facility] [place], [address], [facility] [place], [address] [phone]          Discharge Medication List as of 11/10/2019 12:05 AM      CONTINUE these medications which have NOT CHANGED    Details   aspirin 81 mg chewable tablet Chew 81 mg daily, Historical Med      atorvastatin (LIPITOR) 80 mg tablet TAKE 1 TABLET BY MOUTH EVERY DAY, Normal      calcium carbonate (CALCIUM 600) 600 MG tablet Take 600 mg by mouth daily, Historical Med      cholecalciferol (VITAMIN D3) 1,000 units tablet Take 1,000 Units by mouth daily, Historical Med      ezetimibe (ZETIA) 10 mg tablet Take 1 tablet (10 mg total) by mouth daily, Starting Wed 3/6/2019, Normal      losartan (COZAAR) 25 mg tablet TAKE 1 TABLET BY MOUTH EVERY DAY, Normal      metoprolol succinate (TOPROL-XL) 100 mg 24 hr tablet TAKE 1 TABLET BY MOUTH EVERY DAY, Normal      omeprazole (PriLOSEC) 20 mg delayed release capsule Take 1 capsule (20 mg total) by mouth daily, Starting Fri 2/22/2019, Normal      tamsulosin (FLOMAX) 0 4 mg Take 1 capsule (0 4 mg total) by mouth daily with dinner, Starting Thu 2/14/2019, Normal           No discharge procedures on file      ED Provider  Electronically Signed by           Sarina Conner DO  11/10/19 0100

## 2019-11-11 ENCOUNTER — TELEPHONE (OUTPATIENT)
Dept: UROLOGY | Facility: MEDICAL CENTER | Age: 74
End: 2019-11-11

## 2019-11-11 DIAGNOSIS — R33.9 URINARY RETENTION: ICD-10-CM

## 2019-11-11 LAB — BACTERIA UR CULT: NORMAL

## 2019-11-11 RX ORDER — TAMSULOSIN HYDROCHLORIDE 0.4 MG/1
0.4 CAPSULE ORAL
Qty: 30 CAPSULE | Refills: 11 | Status: SHIPPED | OUTPATIENT
Start: 2019-11-11 | End: 2020-02-11 | Stop reason: SDUPTHER

## 2019-11-11 NOTE — TELEPHONE ENCOUNTER
Post er follow up  Please triage  Patient of Re Sender seen in the [facility] end office  Patient was seen in the Er on 11/09/19 and need a follow up appointment  Please advise

## 2019-11-11 NOTE — TELEPHONE ENCOUNTER
Patient originally presented to office 2/14/19 with sal catheter that was placed in the ER for retention  Patient passed void trial on 2/18/19  Patient was seen in follow up on 3/18/19 and PVR at that time was 0 ml  At that time patient was instructed to restart Flomax, as patient had previous discontinued it for reports of heartburn  Patient was instructed to contact office if symptoms of heartburn returned and otherwise follow up in 3 months  Patient cancelled his 3 month follow up  Currently no urology follow up scheduled  Patient was seen in the ER on 11/9/19 for urinary retention  PVR measured in ER was 493 ml  Sal was placed and patient instructed to call urology for follow up  Per review of medications, Flomax still listed on patient's active medication list      Please review and advise on appropriate follow up for patient

## 2019-11-11 NOTE — TELEPHONE ENCOUNTER
Called and spoke with patient  Advised patient that flomax has been sent to his pharmacy for him to start taking   Patient scheduled for void trial 11/21/19 at 800 and 200pm

## 2019-11-11 NOTE — TELEPHONE ENCOUNTER
Called and spoke with patient  Patient states that he is not constipated  Patient also stated that he was not given flomax and has not been taking it  Should patient still have void trial in 1 week?

## 2019-11-21 ENCOUNTER — CLINICAL SUPPORT (OUTPATIENT)
Dept: UROLOGY | Facility: CLINIC | Age: 74
End: 2019-11-21
Payer: MEDICARE

## 2019-11-21 VITALS
BODY MASS INDEX: 24.5 KG/M2 | WEIGHT: 165.4 LBS | HEART RATE: 82 BPM | DIASTOLIC BLOOD PRESSURE: 80 MMHG | HEIGHT: 69 IN | SYSTOLIC BLOOD PRESSURE: 142 MMHG

## 2019-11-21 DIAGNOSIS — R33.9 URINARY RETENTION: Primary | ICD-10-CM

## 2019-11-21 LAB — POST-VOID RESIDUAL VOLUME, ML POC: 232 ML

## 2019-11-21 PROCEDURE — 51798 US URINE CAPACITY MEASURE: CPT

## 2019-11-21 PROCEDURE — 99211 OFF/OP EST MAY X REQ PHY/QHP: CPT

## 2019-11-21 NOTE — PROGRESS NOTES
11/21/2019    Ken Mann  1945  [id number]    Diagnosis  Chief Complaint     Urinary Retention; Void Trial          Patient presents for void trial managed by office       Procedure Sal removal/voiding trial    Patient presents with clear yellow urine in sal catheter leg bag  Reports taking Flomax nightly, denies any issues with constipation  16 Fr sal catheter removed after deflation of an intact balloon  Patient tolerated well  Encouraged patient to hydrate well and return this afternoon for post void residual   he knows he may return early if uncomfortable and unable to urinate  Patient agrees to this plan          Vitals:    11/21/19 0747   BP: 142/80   Pulse: 82   Weight: 75 kg (165 lb 6 4 oz)   Height: 5' 9" (1 753 m)           Paty Mills RN

## 2019-11-21 NOTE — PROGRESS NOTES
11/21/2019    Nasim Hugo  1945  [id number]    Diagnosis  Chief Complaint     Urinary Retention; Void Trial          Patient returned this afternoon  Patient states he was able to void  Patient voided 260ml while in office  Bladder ultrasound performed and PVR measured 232ml      Recent Results (from the past 1 hour(s))   POCT Measure PVR    Collection Time: 11/21/19  3:45 PM   Result Value Ref Range    POST-VOID RESIDUAL VOLUME, ML  mL     Plan: Patient is to follow up in 1 month with PVR      Vitals:    11/21/19 0747   BP: 142/80   Pulse: 82   Weight: 75 kg (165 lb 6 4 oz)   Height: 5' 9" (1 753 m)           [facility], [facility]

## 2019-12-19 PROBLEM — N45.3 EPIDIDYMOORCHITIS: Status: ACTIVE | Noted: 2019-12-19

## 2020-01-25 ENCOUNTER — HOSPITAL ENCOUNTER (EMERGENCY)
Facility: HOSPITAL | Age: 75
Discharge: HOME | End: 2020-01-25
Attending: EMERGENCY MEDICINE | Admitting: EMERGENCY MEDICINE
Payer: MEDICARE

## 2020-01-25 VITALS
TEMPERATURE: 96.2 F | RESPIRATION RATE: 18 BRPM | OXYGEN SATURATION: 93 % | SYSTOLIC BLOOD PRESSURE: 164 MMHG | DIASTOLIC BLOOD PRESSURE: 85 MMHG | BODY MASS INDEX: 24.74 KG/M2 | HEART RATE: 102 BPM | WEIGHT: 167.55 LBS

## 2020-01-25 DIAGNOSIS — K62.5 RECTAL BLEEDING: ICD-10-CM

## 2020-01-25 DIAGNOSIS — R33.9 URINARY RETENTION: Primary | ICD-10-CM

## 2020-01-25 LAB
BACTERIA UR QL AUTO: ABNORMAL /HPF
BILIRUB UR QL STRIP: NEGATIVE
CLARITY UR: CLEAR
COLOR UR: YELLOW
GLUCOSE UR STRIP-MCNC: NEGATIVE MG/DL
HGB UR QL STRIP.AUTO: ABNORMAL
KETONES UR STRIP-MCNC: NEGATIVE MG/DL
LEUKOCYTE ESTERASE UR QL STRIP: NEGATIVE
NITRITE UR QL STRIP: NEGATIVE
NON-SQ EPI CELLS URNS QL MICRO: ABNORMAL /HPF
PH UR STRIP.AUTO: 5.5 [PH]
PROT UR STRIP-MCNC: NEGATIVE MG/DL
RBC #/AREA URNS AUTO: ABNORMAL /HPF
SP GR UR STRIP.AUTO: <=1.005 (ref 1–1.03)
UROBILINOGEN UR QL STRIP.AUTO: 0.2 E.U./DL
WBC #/AREA URNS AUTO: ABNORMAL /HPF

## 2020-01-25 PROCEDURE — 99282 EMERGENCY DEPT VISIT SF MDM: CPT | Performed by: EMERGENCY MEDICINE

## 2020-01-25 PROCEDURE — 99283 EMERGENCY DEPT VISIT LOW MDM: CPT

## 2020-01-25 PROCEDURE — 81001 URINALYSIS AUTO W/SCOPE: CPT | Performed by: EMERGENCY MEDICINE

## 2020-01-25 RX ORDER — POLYETHYLENE GLYCOL 3350 17 G/17G
17 POWDER, FOR SOLUTION ORAL DAILY
Qty: 14 EACH | Refills: 0 | Status: SHIPPED | OUTPATIENT
Start: 2020-01-25 | End: 2020-07-20 | Stop reason: ALTCHOICE

## 2020-01-25 NOTE — ED NOTES
Pt ambulates to BR to attempt to urinate but unable to void at this time     Laquita Porter RN  01/25/20 6046

## 2020-01-25 NOTE — ED PROVIDER NOTES
History  Chief Complaint   Patient presents with    Urinary Retention     pt reports he has been unable to void since this morning  reports abdominal discomfort  History provided by:  Patient   used: No    Difficulty Urinating   Presenting symptoms: dysuria    Context: spontaneously    Relieved by:  Nothing  Worsened by:  Nothing  Ineffective treatments:  None tried  Associated symptoms: abdominal pain and urinary retention    Associated symptoms: no diarrhea, no fever, no flank pain, no nausea, no scrotal swelling, no urinary incontinence and no vomiting    Abdominal pain:     Location:  Suprapubic    Quality: pressure      Severity:  Mild    Onset quality:  Gradual    Duration:  10 hours    Timing:  Intermittent    Progression:  Waxing and waning    Chronicity:  New  Risk factors comment:  Hx of urinary retention and Gupta insertion in past      Prior to Admission Medications   Prescriptions Last Dose Informant Patient Reported?  Taking?   aspirin 81 mg chewable tablet 1/24/2020 at Unknown time Self Yes Yes   Sig: Chew 81 mg daily   atorvastatin (LIPITOR) 80 mg tablet 1/24/2020 at Unknown time Self No Yes   Sig: TAKE 1 TABLET BY MOUTH EVERY DAY   calcium carbonate (CALCIUM 600) 600 MG tablet 1/24/2020 at Unknown time Self Yes Yes   Sig: Take 600 mg by mouth daily   cholecalciferol (VITAMIN D3) 1,000 units tablet Not Taking at Unknown time Self Yes No   Sig: Take 1,000 Units by mouth daily   ezetimibe (ZETIA) 10 mg tablet 1/24/2020 at Unknown time Self No Yes   Sig: Take 1 tablet (10 mg total) by mouth daily   losartan (COZAAR) 25 mg tablet 1/24/2020 at Unknown time Self No Yes   Sig: TAKE 1 TABLET BY MOUTH EVERY DAY   metoprolol succinate (TOPROL-XL) 100 mg 24 hr tablet 1/24/2020 at Unknown time Self No Yes   Sig: TAKE 1 TABLET BY MOUTH EVERY DAY   omeprazole (PriLOSEC) 20 mg delayed release capsule Not Taking at Unknown time Self No No   Sig: Take 1 capsule (20 mg total) by mouth daily Patient not taking: Reported on 11/21/2019   tamsulosin (FLOMAX) 0 4 mg 1/25/2020 at Unknown time Self No Yes   Sig: Take 1 capsule (0 4 mg total) by mouth daily with dinner      Facility-Administered Medications: None       Past Medical History:   Diagnosis Date    Coronary artery disease     Full dentures     upper and lower    GERD (gastroesophageal reflux disease)     Hyperlipidemia     Hypertension     Inguinal hernia 02/2018    right side    Myocardial infarction ([address] )     Pneumonia     Sciatica     Wears glasses        Past Surgical History:   Procedure Laterality Date    APPENDECTOMY      CORONARY ANGIOPLASTY WITH STENT PLACEMENT  05/2013    x1    HERNIA REPAIR Left     inguinal hernia     LAPAROSCOPIC INGUINAL HERNIA REPAIR Right 02/08/2018    ROBOTIC REPAIR WITH MESH-MANASA TAPIA MD    TONSILLECTOMY      WISDOM TOOTH EXTRACTION         Family History   Problem Relation Age of Onset    Gallbladder disease Mother      I have reviewed and agree with the history as documented  Social History     Tobacco Use    Smoking status: Current Every Day Smoker     Packs/day: 0 50     Years: 40 00     Pack years: 20 00    Smokeless tobacco: Never Used   Substance Use Topics    Alcohol use: Yes     Alcohol/week: 10 0 standard drinks     Types: 10 Cans of beer per week     Comment: social     Drug use: No        Review of Systems   Constitutional: Negative for chills and fever  HENT: Negative for facial swelling, sore throat and trouble swallowing  Eyes: Negative for pain and visual disturbance  Respiratory: Negative for cough and shortness of breath  Cardiovascular: Negative for chest pain and leg swelling  Gastrointestinal: Positive for abdominal pain  Negative for blood in stool, diarrhea, nausea and vomiting  Genitourinary: Positive for dysuria  Negative for bladder incontinence, flank pain and scrotal swelling     Musculoskeletal: Negative for back pain, neck pain and neck stiffness  Skin: Negative for pallor and rash  Allergic/Immunologic: Negative for environmental allergies and immunocompromised state  Neurological: Negative for dizziness and headaches  Hematological: Negative for adenopathy  Does not bruise/bleed easily  Psychiatric/Behavioral: Negative for agitation and behavioral problems  All other systems reviewed and are negative  Physical Exam  Physical Exam   Constitutional: He is oriented to person, place, and time  He appears well-developed and well-nourished  No distress  HENT:   Head: Normocephalic and atraumatic  Eyes: EOM are normal    Neck: Normal range of motion  Neck supple  Cardiovascular: Normal rate, regular rhythm, normal heart sounds and intact distal pulses  Pulmonary/Chest: Effort normal and breath sounds normal    Abdominal: Soft  Bowel sounds are normal  There is tenderness (suprapubic fullness)  There is no rebound and no guarding  Musculoskeletal: Normal range of motion  Neurological: He is alert and oriented to person, place, and time  Skin: Skin is warm and dry  Psychiatric: He has a normal mood and affect  Nursing note and vitals reviewed        Vital Signs  ED Triage Vitals   Temperature Pulse Respirations Blood Pressure SpO2   01/25/20 1611 01/25/20 1611 01/25/20 1611 01/25/20 1614 01/25/20 1611   (!) 96 2 °F (35 7 °C) 102 18 164/85 93 %      Temp Source Heart Rate Source Patient Position - Orthostatic VS BP Location FiO2 (%)   01/25/20 1611 01/25/20 1611 01/25/20 1611 01/25/20 1611 --   Temporal Monitor Sitting Right arm       Pain Score       --                  Vitals:    01/25/20 1611 01/25/20 1614   BP:  164/85   Pulse: 102    Patient Position - Orthostatic VS: Sitting Sitting         Visual Acuity      ED Medications  Medications - No data to display    Diagnostic Studies  Results Reviewed     Procedure Component Value Units Date/Time    UA w Reflex to Microscopic w Reflex to Culture [[id number]]  (Abnormal) Collected:  01/25/20 1711    Lab Status:  Final result Specimen:  Urine, Indwelling Gupta Catheter Updated:  01/25/20 1721     Color, UA Yellow     Clarity, UA Clear     Specific Gravity, UA <=1 005     pH, UA 5 5     Leukocytes, UA Negative     Nitrite, UA Negative     Protein, UA Negative mg/dl      Glucose, UA Negative mg/dl      Ketones, UA Negative mg/dl      Urobilinogen, UA 0 2 E U /dl      Bilirubin, UA Negative     Blood, UA Trace-Intact    Urine Microscopic [[id number]] Collected:  01/25/20 1711    Lab Status: In process Specimen:  Urine, Indwelling Gupta Catheter Updated:  01/25/20 1721                 No orders to display              Procedures  Procedures         ED Course  ED Course as of Jan 25 1743   Sat Jan 25, 2020 1701 Bladder scan 488 ml; patient unable to empty bladder, appears uncomfortable, has suprapubic fullness; we will insert Gupta, have patient follow up with Urology  1725 Leukocytes, UA: Negative   1725 UA negative for leucs or nitrites, trace blood noted s/p Gupta insertion  Nitrite, UA: Negative   1725 Patient also c/o constipation and noticed small amount of blood when he wiped, stable vitals; we will have follow up with Colorectal, patient has seen Dr Diego Marshall in past                                   MDM  Number of Diagnoses or Management Options  Rectal bleeding:   Urinary retention: new and requires workup  Diagnosis management comments: 50-year-old male, comes in here attention, states that he has been unable urinate since morning, he had to strain, that caused a little bleeding per rectum, does report mild constipation; denies fever, vomiting, diarrhea  No acute distress:  Vital signs are stable, afebrile, abdomen soft, mild suprapubic tenderness/pressure noted  CT scan 488 mL    Impression:  Urine retention, has history of similar symptoms past, will place Gupta, check urine, advise follow-up with urology and colorectal for rectal bleeding, which may be related to constipation, will advise MiraLax for that  Amount and/or Complexity of Data Reviewed  Clinical lab tests: ordered and reviewed          Disposition  Final diagnoses:   Urinary retention   Rectal bleeding     Time reflects when diagnosis was documented in both MDM as applicable and the Disposition within this note     Time User Action Codes Description Comment    1/25/2020  4:37 PM Mardella Donis Add [R33 9] Urinary retention     1/25/2020  4:37 PM Mardella Donis Add [K62 5] Rectal bleeding       ED Disposition     ED Disposition Condition Date/Time Comment    Discharge Stable Sat Jan 25, 2020  4:37 PM Imani Santana discharge to home/self care  Follow-up Information     Follow up With Specialties Details Why Contact Info Additional [address] [facility] [address] [facility] Schedule an appointment as soon as possible for a visit   [address]  [facility] [address]  [phone]  [facility], [address], [address] [phone]    Kameron Giles MD Colon and Rectal Surgery Schedule an appointment as soon as possible for a visit   [phone]  [facility]    Karlo Lilly   [phone]  [phone]  [phone]             Discharge Medication List as of 1/25/2020  5:27 PM      START taking these medications    Details   polyethylene glycol (MIRALAX) 17 g packet Take 17 g by mouth daily, Starting Sat 1/25/2020, Print         CONTINUE these medications which have NOT CHANGED    Details   aspirin 81 mg chewable tablet Chew 81 mg daily, Historical Med      atorvastatin (LIPITOR) 80 mg tablet TAKE 1 TABLET BY MOUTH EVERY DAY, Normal      calcium carbonate (CALCIUM 600) 600 MG tablet Take 600 mg by mouth daily, Historical Med      ezetimibe (ZETIA) 10 mg tablet Take 1 tablet (10 mg total) by mouth daily, Starting Wed 3/6/2019, Normal      losartan (COZAAR) 25 mg tablet TAKE 1 TABLET BY MOUTH EVERY DAY, Normal      metoprolol succinate (TOPROL-XL) 100 mg 24 hr tablet TAKE 1 TABLET BY MOUTH EVERY DAY, Normal      tamsulosin (FLOMAX) 0 4 mg Take 1 capsule (0 4 mg total) by mouth daily with dinner, Starting Mon 11/11/2019, Normal      cholecalciferol (VITAMIN D3) 1,000 units tablet Take 1,000 Units by mouth daily, Historical Med      omeprazole (PriLOSEC) 20 mg delayed release capsule Take 1 capsule (20 mg total) by mouth daily, Starting Fri 2/22/2019, Normal           No discharge procedures on file      ED Provider  Electronically Signed by           Aristides Mendez MD  01/25/20 0029

## 2020-01-27 ENCOUNTER — TELEPHONE (OUTPATIENT)
Dept: UROLOGY | Facility: MEDICAL CENTER | Age: 75
End: 2020-01-27

## 2020-01-27 NOTE — TELEPHONE ENCOUNTER
Braulio Chew is a 68 y o  male here for follow up evaluation of history of urinary retention  The patient underwent a void trial 2/18/2019 for which he passed  PVR at this time was 79mL  Pt missed 2 appointments and currently has no follow up urology appt      Patient seen in ER for urinary retention on 1/25/20, a which time sal placed  Pt states he went to ER because he was constipated and couldn't urinate  Pt currently taking miralax and he had a small bowel movement yesterday  Pt states he is currently taking tamsulosin 0 4mg at bedtime  Please review and advise appropriate follow up for patient

## 2020-01-27 NOTE — TELEPHONE ENCOUNTER
Patient managed by Dr Jan Qiu on 03/18/19  Patient was in Emergency Room at [facility] on 01/25/20 due to urinary retention  Please triage for appointment  He can be reached at [phone]

## 2020-01-27 NOTE — TELEPHONE ENCOUNTER
Pt has never seen Dr Yesica Nur    It appears he is managed by [facility] and Dr Rosa Peacock at [facility]

## 2020-01-28 NOTE — TELEPHONE ENCOUNTER
Spoke with pt  Advised of Hilary's recommendations  Pt verbalized understanding  Pt states his constipation has resolved   Appointment scheduled on 2/11 at 9:30am

## 2020-01-28 NOTE — TELEPHONE ENCOUNTER
PVR in the ER was 488mL  The patient can undergo a void trial in the next 1-2 weeks and it is very important he resolve his constipation prior to this  He should highly consider cystoscopy for evaluation of his bladder outlet

## 2020-02-11 ENCOUNTER — PROCEDURE VISIT (OUTPATIENT)
Dept: UROLOGY | Facility: CLINIC | Age: 75
End: 2020-02-11
Payer: MEDICARE

## 2020-02-11 VITALS
HEIGHT: 69 IN | SYSTOLIC BLOOD PRESSURE: 140 MMHG | DIASTOLIC BLOOD PRESSURE: 82 MMHG | HEART RATE: 78 BPM | WEIGHT: 170 LBS | BODY MASS INDEX: 25.18 KG/M2

## 2020-02-11 DIAGNOSIS — R33.9 URINARY RETENTION: Primary | ICD-10-CM

## 2020-02-11 DIAGNOSIS — R33.9 URINARY RETENTION: ICD-10-CM

## 2020-02-11 LAB — POST-VOID RESIDUAL VOLUME, ML POC: 47 ML

## 2020-02-11 PROCEDURE — 3079F DIAST BP 80-89 MM HG: CPT | Performed by: UROLOGY

## 2020-02-11 PROCEDURE — 3077F SYST BP >= 140 MM HG: CPT | Performed by: UROLOGY

## 2020-02-11 PROCEDURE — 4040F PNEUMOC VAC/ADMIN/RCVD: CPT | Performed by: UROLOGY

## 2020-02-11 PROCEDURE — 3008F BODY MASS INDEX DOCD: CPT | Performed by: UROLOGY

## 2020-02-11 PROCEDURE — 1160F RVW MEDS BY RX/DR IN RCRD: CPT | Performed by: UROLOGY

## 2020-02-11 PROCEDURE — 51798 US URINE CAPACITY MEASURE: CPT | Performed by: UROLOGY

## 2020-02-11 RX ORDER — TAMSULOSIN HYDROCHLORIDE 0.4 MG/1
0.4 CAPSULE ORAL
Qty: 30 CAPSULE | Refills: 11 | Status: SHIPPED | OUTPATIENT
Start: 2020-02-11 | End: 2021-02-19

## 2020-02-11 NOTE — PROGRESS NOTES
2/11/2020    Braulio Chew  1945  [id number]    Diagnosis  Chief Complaint     Urinary Retention          Patient presents for Gupta removal/Voiding Trial managed by our office    Plan  F/u as scheduled with Andrew Schmitt PA-C  Reviewed signs and symptoms of urinary retention and encouraged to call back with worsening symptoms or the inability to vo    Procedure Gupta removal/voiding trial    Gupta catheter removed after deflation of an intact balloon  Patient tolerated well  Encouraged patient to hydrate well and return this afternoon for post void residual   he knows he may return early if uncomfortable and unable to urinate  Patient agrees to this plan  Patient returned this afternoon  Patient states able to void 4 to 5 times a home  Patient voided 100 ml while in office  Bladder ultrasound performed and PVR measured 48ml  Jose Martin Johnson   Recent Results (from the past 1 hour(s))   POCT Measure PVR    Collection Time: 02/11/20  2:15 PM   Result Value Ref Range    POST-VOID RESIDUAL VOLUME, ML POC 47 mL           Vitals:    02/11/20 0943   BP: 140/82   BP Location: Right arm   Patient Position: Sitting   Cuff Size: Adult   Pulse: 78   Weight: 77 1 kg (170 lb)   Height: 5' 9" (1 753 m)           Darin Arreaga LPN

## 2020-03-17 ENCOUNTER — LAB REQUISITION (OUTPATIENT)
Dept: LAB | Facility: HOSPITAL | Age: 75
End: 2020-03-17
Payer: MEDICARE

## 2020-03-17 DIAGNOSIS — R19.5 OTHER FECAL ABNORMALITIES: ICD-10-CM

## 2020-03-17 DIAGNOSIS — R19.4 CHANGE IN BOWEL HABIT: ICD-10-CM

## 2020-03-17 DIAGNOSIS — K59.00 CONSTIPATION, UNSPECIFIED: ICD-10-CM

## 2020-03-17 PROCEDURE — 88305 TISSUE EXAM BY PATHOLOGIST: CPT | Performed by: PATHOLOGY

## 2020-04-15 DIAGNOSIS — I25.10 CORONARY ARTERY DISEASE INVOLVING NATIVE HEART, ANGINA PRESENCE UNSPECIFIED, UNSPECIFIED VESSEL OR LESION TYPE: ICD-10-CM

## 2020-04-15 DIAGNOSIS — E78.5 DYSLIPIDEMIA: ICD-10-CM

## 2020-04-17 RX ORDER — EZETIMIBE 10 MG/1
TABLET ORAL
Qty: 30 TABLET | Refills: 14 | Status: SHIPPED | OUTPATIENT
Start: 2020-04-17 | End: 2021-05-03

## 2020-04-20 ENCOUNTER — APPOINTMENT (EMERGENCY)
Dept: ULTRASOUND IMAGING | Facility: HOSPITAL | Age: 75
End: 2020-04-20
Payer: MEDICARE

## 2020-04-20 ENCOUNTER — HOSPITAL ENCOUNTER (EMERGENCY)
Facility: HOSPITAL | Age: 75
Discharge: HOME | End: 2020-04-20
Attending: EMERGENCY MEDICINE | Admitting: EMERGENCY MEDICINE
Payer: MEDICARE

## 2020-04-20 VITALS
TEMPERATURE: 98.3 F | BODY MASS INDEX: 25.1 KG/M2 | DIASTOLIC BLOOD PRESSURE: 88 MMHG | RESPIRATION RATE: 16 BRPM | WEIGHT: 170 LBS | OXYGEN SATURATION: 98 % | SYSTOLIC BLOOD PRESSURE: 140 MMHG | HEART RATE: 77 BPM

## 2020-04-20 DIAGNOSIS — N45.1 EPIDIDYMITIS: Primary | ICD-10-CM

## 2020-04-20 LAB
BACTERIA UR QL AUTO: ABNORMAL /HPF
BILIRUB UR QL STRIP: NEGATIVE
CLARITY UR: CLEAR
COLOR UR: YELLOW
GLUCOSE UR STRIP-MCNC: NEGATIVE MG/DL
HGB UR QL STRIP.AUTO: ABNORMAL
KETONES UR STRIP-MCNC: NEGATIVE MG/DL
LEUKOCYTE ESTERASE UR QL STRIP: ABNORMAL
NITRITE UR QL STRIP: NEGATIVE
NON-SQ EPI CELLS URNS QL MICRO: ABNORMAL /HPF
PH UR STRIP.AUTO: 5.5 [PH] (ref 4.5–8)
PROT UR STRIP-MCNC: NEGATIVE MG/DL
RBC #/AREA URNS AUTO: ABNORMAL /HPF
SP GR UR STRIP.AUTO: 1.01 (ref 1–1.03)
UROBILINOGEN UR QL STRIP.AUTO: 0.2 E.U./DL
WBC #/AREA URNS AUTO: ABNORMAL /HPF

## 2020-04-20 PROCEDURE — 76870 US EXAM SCROTUM: CPT

## 2020-04-20 PROCEDURE — 81001 URINALYSIS AUTO W/SCOPE: CPT

## 2020-04-20 PROCEDURE — 99284 EMERGENCY DEPT VISIT MOD MDM: CPT

## 2020-04-20 PROCEDURE — 99284 EMERGENCY DEPT VISIT MOD MDM: CPT | Performed by: EMERGENCY MEDICINE

## 2020-04-20 RX ORDER — SULFAMETHOXAZOLE AND TRIMETHOPRIM 800; 160 MG/1; MG/1
1 TABLET ORAL 2 TIMES DAILY
Qty: 19 TABLET | Refills: 0 | Status: SHIPPED | OUTPATIENT
Start: 2020-04-20 | End: 2020-04-30

## 2020-04-20 RX ORDER — SULFAMETHOXAZOLE AND TRIMETHOPRIM 800; 160 MG/1; MG/1
1 TABLET ORAL ONCE
Status: COMPLETED | OUTPATIENT
Start: 2020-04-20 | End: 2020-04-20

## 2020-04-20 RX ADMIN — SULFAMETHOXAZOLE AND TRIMETHOPRIM 1 TABLET: 800; 160 TABLET ORAL at 10:24

## 2020-06-19 DIAGNOSIS — I25.10 CORONARY ARTERY DISEASE INVOLVING NATIVE CORONARY ARTERY OF NATIVE HEART WITHOUT ANGINA PECTORIS: ICD-10-CM

## 2020-06-19 DIAGNOSIS — I10 BENIGN ESSENTIAL HTN: ICD-10-CM

## 2020-06-19 RX ORDER — LOSARTAN POTASSIUM 25 MG/1
TABLET ORAL
Qty: 30 TABLET | Refills: 5 | Status: SHIPPED | OUTPATIENT
Start: 2020-06-19 | End: 2022-01-10

## 2020-07-04 ENCOUNTER — HOSPITAL ENCOUNTER (EMERGENCY)
Facility: HOSPITAL | Age: 75
Discharge: HOME | End: 2020-07-04
Attending: EMERGENCY MEDICINE
Payer: MEDICARE

## 2020-07-04 VITALS
SYSTOLIC BLOOD PRESSURE: 152 MMHG | BODY MASS INDEX: 24.51 KG/M2 | WEIGHT: 166.01 LBS | OXYGEN SATURATION: 95 % | DIASTOLIC BLOOD PRESSURE: 80 MMHG | HEART RATE: 86 BPM | TEMPERATURE: 97.5 F | RESPIRATION RATE: 18 BRPM

## 2020-07-04 DIAGNOSIS — R33.9 URINARY RETENTION: Primary | ICD-10-CM

## 2020-07-04 LAB
BACTERIA UR QL AUTO: ABNORMAL /HPF
BILIRUB UR QL STRIP: NEGATIVE
CLARITY UR: CLEAR
COLOR UR: YELLOW
GLUCOSE UR STRIP-MCNC: NEGATIVE MG/DL
HGB UR QL STRIP.AUTO: ABNORMAL
KETONES UR STRIP-MCNC: NEGATIVE MG/DL
LEUKOCYTE ESTERASE UR QL STRIP: NEGATIVE
NITRITE UR QL STRIP: NEGATIVE
NON-SQ EPI CELLS URNS QL MICRO: ABNORMAL /HPF
PH UR STRIP.AUTO: 5.5 [PH] (ref 4.5–8)
PROT UR STRIP-MCNC: NEGATIVE MG/DL
RBC #/AREA URNS AUTO: ABNORMAL /HPF
SP GR UR STRIP.AUTO: 1.02 (ref 1–1.03)
UROBILINOGEN UR QL STRIP.AUTO: 0.2 E.U./DL
WBC #/AREA URNS AUTO: ABNORMAL /HPF

## 2020-07-04 PROCEDURE — 81001 URINALYSIS AUTO W/SCOPE: CPT

## 2020-07-04 PROCEDURE — 99283 EMERGENCY DEPT VISIT LOW MDM: CPT

## 2020-07-04 PROCEDURE — 99283 EMERGENCY DEPT VISIT LOW MDM: CPT | Performed by: EMERGENCY MEDICINE

## 2020-07-04 RX ORDER — TAMSULOSIN HYDROCHLORIDE 0.4 MG/1
0.4 CAPSULE ORAL
Qty: 20 CAPSULE | Refills: 0 | Status: SHIPPED | OUTPATIENT
Start: 2020-07-04 | End: 2020-07-20 | Stop reason: SDUPTHER

## 2020-07-04 NOTE — DISCHARGE INSTRUCTIONS
The number for the urologist is included in these discharge instructions  Call Tuesday, let them know you were in the ER had had a Gupta Catheter placed  You are to be seen in the office for continued management, the office will determine the appropriate time for a follow up visit

## 2020-07-04 NOTE — ED PROVIDER NOTES
History  Chief Complaint   Patient presents with    Difficulty Urinating     Difficulty urinating, lower abdominal pain since yesterday  History of same in past  No known fever, nausea, vomiting  68 YO male presents with urinary retention  Pt states symptoms began 3 days prior but have bene worsening  He states he was able to produce drops of urine 1 hour ago but not since  He has no fevers, no pain with urination  He has developed increasing lower abdominal pain, pressure  This has been constant, non-radiating  Pt states he has had similar in the past, requiring Gupta Catheter placement 3 previous times  Pt has followed with urology, states thinks it is his prostate that is the primary cause  Pt denies CP/SOB/F/C/N/V/D/C  History provided by:  Patient  Difficulty Urinating   Presenting symptoms: dysuria    Context: spontaneously    Relieved by:  Nothing  Worsened by:  Nothing  Ineffective treatments:  None tried  Associated symptoms: urinary retention    Associated symptoms: no abdominal pain, no fever, no flank pain, no hematuria, no nausea, no urinary frequency and no vomiting        Prior to Admission Medications   Prescriptions Last Dose Informant Patient Reported?  Taking?   aspirin 81 mg chewable tablet  Self Yes No   Sig: Chew 81 mg daily   atorvastatin (LIPITOR) 80 mg tablet  Self No No   Sig: TAKE 1 TABLET BY MOUTH EVERY DAY   calcium carbonate (CALCIUM 600) 600 MG tablet  Self Yes No   Sig: Take 600 mg by mouth daily   cholecalciferol (VITAMIN D3) 1,000 units tablet  Self Yes No   Sig: Take 1,000 Units by mouth daily   ezetimibe (ZETIA) 10 mg tablet   No No   Sig: TAKE 1 TABLET BY MOUTH EVERY DAY   losartan (COZAAR) 25 mg tablet   No No   Sig: TAKE 1 TABLET BY MOUTH EVERY DAY   metoprolol succinate (TOPROL-XL) 100 mg 24 hr tablet  Self No No   Sig: TAKE 1 TABLET BY MOUTH EVERY DAY   omeprazole (PriLOSEC) 20 mg delayed release capsule  Self No No   Sig: Take 1 capsule (20 mg total) by mouth daily Patient not taking: Reported on 11/21/2019   polyethylene glycol (MIRALAX) 17 g packet   No No   Sig: Take 17 g by mouth daily   tamsulosin (FLOMAX) 0 4 mg   No No   Sig: Take 1 capsule (0 4 mg total) by mouth daily with dinner      Facility-Administered Medications: None       Past Medical History:   Diagnosis Date    Coronary artery disease     Full dentures     upper and lower    GERD (gastroesophageal reflux disease)     Hyperlipidemia     Hypertension     Inguinal hernia 02/2018    right side    Myocardial infarction ([address] )     Pneumonia     Sciatica     Wears glasses        Past Surgical History:   Procedure Laterality Date    APPENDECTOMY      CORONARY ANGIOPLASTY WITH STENT PLACEMENT  05/2013    x1    HERNIA REPAIR Left     inguinal hernia     LAPAROSCOPIC INGUINAL HERNIA REPAIR Right 02/08/2018    ROBOTIC REPAIR WITH MESH-MANASA TAPIA MD    TONSILLECTOMY      WISDOM TOOTH EXTRACTION         Family History   Problem Relation Age of Onset    Gallbladder disease Mother      I have reviewed and agree with the history as documented  E-Cigarette/Vaping     E-Cigarette/Vaping Substances     Social History     Tobacco Use    Smoking status: Current Every Day Smoker     Packs/day: 0 50     Years: 40 00     Pack years: 20 00    Smokeless tobacco: Never Used   Substance Use Topics    Alcohol use: Yes     Alcohol/week: 10 0 standard drinks     Types: 10 Cans of beer per week     Comment: social     Drug use: No       Review of Systems   Constitutional: Negative for fever  HENT: Negative for dental problem  Eyes: Negative for visual disturbance  Respiratory: Negative for shortness of breath  Cardiovascular: Negative for chest pain  Gastrointestinal: Negative for abdominal pain, nausea and vomiting  Genitourinary: Positive for dysuria  Negative for flank pain, frequency and hematuria  Musculoskeletal: Negative for neck pain and neck stiffness  Skin: Negative for rash  Neurological: Negative for dizziness, weakness and light-headedness  Psychiatric/Behavioral: Negative for agitation, behavioral problems and confusion  All other systems reviewed and are negative  Physical Exam  Physical Exam   Constitutional: He is oriented to person, place, and time  He appears well-developed and well-nourished  HENT:   Head: Normocephalic and atraumatic  Eyes: Pupils are equal, round, and reactive to light  EOM are normal    Neck: Normal range of motion  Cardiovascular: Normal rate, regular rhythm and normal heart sounds  Pulmonary/Chest: Effort normal and breath sounds normal    Abdominal: Soft  There is tenderness  Musculoskeletal: Normal range of motion  Neurological: He is alert and oriented to person, place, and time  Skin: Skin is warm and dry  Psychiatric: He has a normal mood and affect  His behavior is normal    Nursing note and vitals reviewed        Vital Signs  ED Triage Vitals [07/04/20 1701]   Temperature Pulse Respirations Blood Pressure SpO2   97 5 °F (36 4 °C) 86 18 152/80 95 %      Temp Source Heart Rate Source Patient Position - Orthostatic VS BP Location FiO2 (%)   Tympanic Monitor Standing Left arm --      Pain Score       7           Vitals:    07/04/20 1701   BP: 152/80   Pulse: 86   Patient Position - Orthostatic VS: Standing         Visual Acuity      ED Medications  Medications - No data to display    Diagnostic Studies  Results Reviewed     Procedure Component Value Units Date/Time    Urine Microscopic [[id number]]  (Abnormal) Collected:  07/04/20 1834    Lab Status:  Final result Specimen:  Urine, Indwelling Gupta Catheter Updated:  07/04/20 1916     RBC, UA None Seen /hpf      WBC, UA 1-2 /hpf      Epithelial Cells Occasional /hpf      Bacteria, UA None Seen /hpf     Urine Macroscopic, POC [[id number]]  (Abnormal) Collected:  07/04/20 1834    Lab Status:  Final result Specimen:  Urine Updated:  07/04/20 1836     Color, UA Yellow     Clarity, UA Clear     pH, UA 5 5     Leukocytes, UA Negative     Nitrite, UA Negative     Protein, UA Negative mg/dl      Glucose, UA Negative mg/dl      Ketones, UA Negative mg/dl      Urobilinogen, UA 0 2 E U /dl      Bilirubin, UA Negative     Blood, UA Moderate     Specific [place], UA 1 020    Narrative:       CLINITEK RESULT                 No orders to display              Procedures  Procedures         ED Course                                             MDM  Number of Diagnoses or Management Options  Urinary retention: new and requires workup  Diagnosis management comments: 1  Urinary retention - Pt with worsening retention, now unable to produce urine  Will have Gupta catheter placed and check urine for infection  Amount and/or Complexity of Data Reviewed  Clinical lab tests: ordered and reviewed  Obtain history from someone other than the patient: yes  Review and summarize past medical records: yes    Patient Progress  Patient progress: improved        Disposition  Final diagnoses:   Urinary retention     Time reflects when diagnosis was documented in both MDM as applicable and the Disposition within this note     Time User Action Codes Description Comment    7/4/2020  6:40 PM Trena Snellen E Add [R33 9] Urinary retention       ED Disposition     ED Disposition Condition Date/Time Comment    Discharge Stable Sat Jul 4, 2020  6:40 PM Raúl Skinner discharge to home/self care              Follow-up Information     Follow up With Specialties Details Why Contact Info Additional [address] For [facility] Urology Schedule an appointment as soon as possible for a visit   [facility] [phone]  [phone]  [facility], [address] [place] [address], [id number] [phone]          Discharge Medication List as of 7/4/2020  6:51 PM      CONTINUE these medications which have NOT CHANGED Details   aspirin 81 mg chewable tablet Chew 81 mg daily, Historical Med      atorvastatin (LIPITOR) 80 mg tablet TAKE 1 TABLET BY MOUTH EVERY DAY, Normal      calcium carbonate (CALCIUM 600) 600 MG tablet Take 600 mg by mouth daily, Historical Med      cholecalciferol (VITAMIN D3) 1,000 units tablet Take 1,000 Units by mouth daily, Historical Med      ezetimibe (ZETIA) 10 mg tablet TAKE 1 TABLET BY MOUTH EVERY DAY, Normal      losartan (COZAAR) 25 mg tablet TAKE 1 TABLET BY MOUTH EVERY DAY, Normal      metoprolol succinate (TOPROL-XL) 100 mg 24 hr tablet TAKE 1 TABLET BY MOUTH EVERY DAY, Normal      omeprazole (PriLOSEC) 20 mg delayed release capsule Take 1 capsule (20 mg total) by mouth daily, Starting Fri 2/22/2019, Normal      polyethylene glycol (MIRALAX) 17 g packet Take 17 g by mouth daily, Starting Sat 1/25/2020, Print      tamsulosin (FLOMAX) 0 4 mg Take 1 capsule (0 4 mg total) by mouth daily with dinner, Starting Tue 2/11/2020, Normal           No discharge procedures on file      PDMP Review     None          ED Provider  Electronically Signed by           Balwinder Martinez MD  07/05/20 4425

## 2020-07-07 ENCOUNTER — TELEPHONE (OUTPATIENT)
Dept: UROLOGY | Facility: MEDICAL CENTER | Age: 75
End: 2020-07-07

## 2020-07-07 NOTE — TELEPHONE ENCOUNTER
Patient of [address] office  Patient called in regard to need for follow up and removal of the catheter   Reported catheter can be removed 7/17 or 7/20  Please call to advise at [phone]  Thank you

## 2020-07-08 NOTE — TELEPHONE ENCOUNTER
Patient has a history of retention  Patient has never seen a MD in the office  Was in the ER on 7/4 Gupta was placed   Please advise on follow up

## 2020-07-08 NOTE — TELEPHONE ENCOUNTER
Patient scheduled July 20th at 9 am for sal removal informed patient that he would have to return at 2 pm for bladder scan

## 2020-07-08 NOTE — TELEPHONE ENCOUNTER
In and out of retention several times over the past year, seen Nish Hamm a few times  Ensure he is taking flomax every day and treating any constipation as well  No docmentation on volume of retention at 7/4 visit, so formal void trial at 1 week at soonest  He should have MD appt after that for cysto/TRUS

## 2020-07-20 ENCOUNTER — PROCEDURE VISIT (OUTPATIENT)
Dept: UROLOGY | Facility: CLINIC | Age: 75
End: 2020-07-20
Payer: MEDICARE

## 2020-07-20 VITALS
WEIGHT: 184.8 LBS | SYSTOLIC BLOOD PRESSURE: 130 MMHG | TEMPERATURE: 98.2 F | DIASTOLIC BLOOD PRESSURE: 70 MMHG | HEART RATE: 72 BPM | HEIGHT: 69 IN | BODY MASS INDEX: 27.37 KG/M2

## 2020-07-20 DIAGNOSIS — R33.9 URINARY RETENTION: ICD-10-CM

## 2020-07-20 DIAGNOSIS — N45.1 EPIDIDYMITIS: Primary | ICD-10-CM

## 2020-07-20 LAB — POST-VOID RESIDUAL VOLUME, ML POC: 11 ML

## 2020-07-20 PROCEDURE — 51798 US URINE CAPACITY MEASURE: CPT | Performed by: UROLOGY

## 2020-07-20 RX ORDER — DOXYCYCLINE 100 MG/1
100 TABLET ORAL 2 TIMES DAILY
Qty: 28 TABLET | Refills: 0 | Status: SHIPPED | OUTPATIENT
Start: 2020-07-20 | End: 2020-08-03

## 2020-07-20 NOTE — PROGRESS NOTES
7/20/2020    Shekhar Horton  1945  [id number]    Diagnosis  Chief Complaint     Urinary Retention          Patient presents for sal removal void trial managed by our office    Plan  · Start Doxycycline for 14 days as prescribed for c/o right testicular pain and h/o epididymitis per MICHELLE Joyce  · 2 week FU with advanced practitioner for right epididymitis complaints  · First available Cysto/TURS with MD for further prostate evaluation  · Continue tamsulosin, stressed urinary symptoms likely improved because of this medication and he should not stop it again without speaking to the office  · We did discuss CIC briefly if patient develops another episode of urinary retention  ·   Assessment:  History:  Patient with several recent episodes of urinary retention over the past few months  After last episode of retenetion he also developed left epididymitis which was treated accordingly  Subjective:  Most recently patient developed urinary retention on 7/4/2020  He reports he stopped his tamsulosin 1-2 weeks prior to this because " everything was fine "  He also report 2 beers 1-2 days prior to catheter replacement  Prior to this he had stopped drinking for 90 days  He denies constipation, narcotics, cold or allergy meds prior to this episode of retention  He notes for the past three days worsening pain to right testicle similar to pain he had with epididymitis on the left  Objective:   Catheter draining clear yellow urine  Right testicle swollen, reddened and tender to palpation  Symptoms of right epididymitis discuss with Beatrice Diaz, see plan  Procedure Sal removal/voiding trial    Sal catheter removed after deflation of an intact balloon  Patient tolerated well  Encouraged patient to hydrate well and return this afternoon for post void residual   He knows he may return early if uncomfortable and unable to urinate  Patient agrees to this plan      Patient returned this afternoon  Patient states able to void x2 at home, "once a good amount and once for a small amount "  Patient voided 75 ml while in office  Bladder ultrasound performed and PVR measured 11ml      Recent Results (from the past 1 hour(s))   POCT Measure PVR    Collection Time: 07/20/20  2:24 PM   Result Value Ref Range    POST-VOID RESIDUAL VOLUME, ML POC 11 mL         Vitals:    07/20/20 0856   BP: 130/70   BP Location: Right arm   Patient Position: Sitting   Cuff Size: Standard   Pulse: 72   Temp: 98 2 °F (36 8 °C)   TempSrc: Temporal   Weight: 83 8 kg (184 lb 12 8 oz)   Height: 5' 9" (1 753 m)           LEVON Garcia BSN

## 2020-08-07 ENCOUNTER — OFFICE VISIT (OUTPATIENT)
Dept: UROLOGY | Facility: CLINIC | Age: 75
End: 2020-08-07
Payer: MEDICARE

## 2020-08-07 VITALS
HEIGHT: 69 IN | DIASTOLIC BLOOD PRESSURE: 66 MMHG | BODY MASS INDEX: 24.29 KG/M2 | HEART RATE: 77 BPM | SYSTOLIC BLOOD PRESSURE: 136 MMHG | TEMPERATURE: 97.8 F | WEIGHT: 164 LBS

## 2020-08-07 DIAGNOSIS — N45.3 EPIDIDYMOORCHITIS: ICD-10-CM

## 2020-08-07 DIAGNOSIS — R33.9 URINARY RETENTION: Primary | ICD-10-CM

## 2020-08-07 LAB — POST-VOID RESIDUAL VOLUME, ML POC: 13 ML

## 2020-08-07 PROCEDURE — 51798 US URINE CAPACITY MEASURE: CPT | Performed by: PHYSICIAN ASSISTANT

## 2020-08-07 PROCEDURE — 4040F PNEUMOC VAC/ADMIN/RCVD: CPT | Performed by: PHYSICIAN ASSISTANT

## 2020-08-07 PROCEDURE — 3008F BODY MASS INDEX DOCD: CPT | Performed by: PHYSICIAN ASSISTANT

## 2020-08-07 PROCEDURE — 1160F RVW MEDS BY RX/DR IN RCRD: CPT | Performed by: PHYSICIAN ASSISTANT

## 2020-08-07 PROCEDURE — 99213 OFFICE O/P EST LOW 20 MIN: CPT | Performed by: PHYSICIAN ASSISTANT

## 2020-08-07 PROCEDURE — 3075F SYST BP GE 130 - 139MM HG: CPT | Performed by: PHYSICIAN ASSISTANT

## 2020-08-07 PROCEDURE — 3078F DIAST BP <80 MM HG: CPT | Performed by: PHYSICIAN ASSISTANT

## 2020-08-07 NOTE — PROGRESS NOTES
8/7/2020      Chief Complaint   Patient presents with    Follow-up    epididymitis    Urinary Retention         Assessment and Plan    69 y o  male managed by Dr Mari Troncoso    1  Right epididymorchitis  - likely related to underlying retention and catheterization  - completed doxycycline x 14 days  - continue scrotal support, nsaids, flomax  - anticipate continued improvement in residual induration over the next several weeks    2  BPH with urinary retention  - Gupta placed 7/4/2020    urinalysis not suggestive of infection, no culture data  - s/p successful void trial 7/20/2020  - pvr today 13ml  - resumed flomax 0 4mg nightly and doing well    Already scheduled for cystoscopy/TRUS measurement on 8/21 for retention and bladder outlet evaluation as has had 3 retention episodes in recent years and twice epididymorchitis  Fortunately emptying better today after resumation of flomax  History of Present Illness  Camila Miller is a 69 y o  male here for evaluation of 2 week check for right epididymitis  Patient with longstanding BPH on flomax, was feeling well so discontinued flomax use and within few weeks developed urinary retention requiring Gupta catheter  At void trial was noted to have signs and symptoms of right epididymorchitis as well and this was treated promptly with doxycycline  He has since resumed flomax and is voiding well, he also completed the antibiotics earlier this week and feeling better though the testicle remains firm and swollen there is less pain and no edema or erythema to the scrotal skin  He's had no fevers  Review of Systems   Constitutional: Negative for activity change, appetite change, chills, fever and unexpected weight change  HENT: Negative  Respiratory: Negative  Negative for shortness of breath  Cardiovascular: Negative  Negative for chest pain  Gastrointestinal: Negative for abdominal pain, diarrhea, nausea and vomiting  Endocrine: Negative  Genitourinary: Positive for scrotal swelling and testicular pain  Negative for decreased urine volume, difficulty urinating (resolved- back on flomax), discharge, dysuria, flank pain, frequency, hematuria, penile pain, penile swelling and urgency  Musculoskeletal: Negative for back pain and gait problem  Skin: Negative  Allergic/Immunologic: Negative  Neurological: Negative  Hematological: Negative for adenopathy  Does not bruise/bleed easily  Urinary Incontinence Screening      Most Recent Value   Urinary Incontinence   Urinary Incontinence? No   Incomplete emptying? No   Urinary frequency? No   Urinary urgency? No   Urinary hesitancy? No   Dysuria (painful difficult urination)? No   Nocturia (waking up to use the bathroom)? No   Straining (having to push to go)?   Yes [at times]   Weak stream?  No   Intermittent stream?  Yes               Past Medical History  Past Medical History:   Diagnosis Date    Coronary artery disease     Full dentures     upper and lower    GERD (gastroesophageal reflux disease)     Hyperlipidemia     Hypertension     Inguinal hernia 02/2018    right side    Myocardial infarction ([address] )     Pneumonia     Sciatica     Wears glasses      Past Social History  Past Surgical History:   Procedure Laterality Date    APPENDECTOMY      CORONARY ANGIOPLASTY WITH STENT PLACEMENT  05/2013    x1    HERNIA REPAIR Left     inguinal hernia     LAPAROSCOPIC INGUINAL HERNIA REPAIR Right 02/08/2018    ROBOTIC REPAIR WITH MESH-MANASA TAPIA MD    TONSILLECTOMY      WISDOM TOOTH EXTRACTION       Social History     Tobacco Use   Smoking Status Current Every Day Smoker    Packs/day: 0 50    Years: 40 00    Pack years: 20 00   Smokeless Tobacco Never Used     Past Family History  Family History   Problem Relation Age of Onset    Gallbladder disease Mother      Past Social history  Social History     Socioeconomic History    Marital status: /Civil Union Spouse name: Not on file    Number of children: Not on file    Years of education: Not on file    Highest education level: Not on file   Occupational History    Not on file   Social Needs    Financial resource strain: Not on file    Food insecurity     Worry: Not on file     Inability: Not on file    Transportation needs     Medical: Not on file     Non-medical: Not on file   Tobacco Use    Smoking status: Current Every Day Smoker     Packs/day: 0 50     Years: 40 00     Pack years: 20 00    Smokeless tobacco: Never Used   Substance and Sexual Activity    Alcohol use:  Yes     Alcohol/week: 10 0 standard drinks     Types: 10 Cans of beer per week     Comment: social     Drug use: No    Sexual activity: Not on file   Lifestyle    Physical activity     Days per week: Not on file     Minutes per session: Not on file    Stress: Not on file   Relationships    Social connections     Talks on phone: Not on file     Gets together: Not on file     Attends Islam service: Not on file     Active member of club or organization: Not on file     Attends meetings of clubs or organizations: Not on file     Relationship status: Not on file    Intimate partner violence     Fear of current or ex partner: Not on file     Emotionally abused: Not on file     Physically abused: Not on file     Forced sexual activity: Not on file   Other Topics Concern    Not on file   Social History Narrative    Not on file     Current Medications  Current Outpatient Medications   Medication Sig Dispense Refill    aspirin 81 mg chewable tablet Chew 81 mg daily      atorvastatin (LIPITOR) 80 mg tablet TAKE 1 TABLET BY MOUTH EVERY DAY 30 tablet 11    ezetimibe (ZETIA) 10 mg tablet TAKE 1 TABLET BY MOUTH EVERY DAY 30 tablet 14    losartan (COZAAR) 25 mg tablet TAKE 1 TABLET BY MOUTH EVERY DAY 30 tablet 5    metoprolol succinate (TOPROL-XL) 100 mg 24 hr tablet TAKE 1 TABLET BY MOUTH EVERY DAY 30 tablet 11    tamsulosin (FLOMAX) 0 4 mg Take 1 capsule (0 4 mg total) by mouth daily with dinner 30 capsule 11     No current facility-administered medications for this visit  Allergies  No Known Allergies      The following portions of the patient's history were reviewed and updated as appropriate: allergies, current medications, past medical history, past social history, past surgical history and problem list       Vitals  Vitals:    08/07/20 1026   BP: 136/66   BP Location: Left arm   Patient Position: Sitting   Cuff Size: Adult   Pulse: 77   Temp: 97 8 °F (36 6 °C)   Weight: 74 4 kg (164 lb)   Height: 5' 9" (1 753 m)       Physical Exam  Vitals signs and nursing note reviewed  Constitutional:       General: He is not in acute distress  Appearance: He is well-developed  He is not diaphoretic  HENT:      Head: Normocephalic and atraumatic  Pulmonary:      Effort: Pulmonary effort is normal    Genitourinary:     Comments: Circumcised penis with either hypospadias or some ventral meatal erosion patient reports longstanding  Testes descended bilaterally  Left testicle smooth nontender nonenlarged  Right testicle smooth firm and enlarged 2x size and mildly tender  Scrotal skin without edema or erythema  Skin:     General: Skin is warm and dry  Neurological:      Mental Status: He is alert and oriented to person, place, and time        Gait: Gait normal    Psychiatric:         Speech: Speech normal          Behavior: Behavior normal            Results  No results found for this or any previous visit (from the past 1 hour(s)) ]  Lab Results   Component Value Date    PSA 3 5 01/24/2018     Lab Results   Component Value Date    CALCIUM 8 8 02/09/2019    K 4 1 02/09/2019    CO2 24 02/09/2019     02/09/2019    BUN 11 02/09/2019    CREATININE 0 79 02/09/2019     Lab Results   Component Value Date    WBC 13 35 (H) 01/24/2018    HGB 17 9 (H) 01/24/2018    HCT 51 8 (H) 01/24/2018    MCV 99 (H) 01/24/2018     01/24/2018 Orders  Orders Placed This Encounter   Procedures    POCT Measure PVR

## 2020-08-17 ENCOUNTER — TELEPHONE (OUTPATIENT)
Dept: FAMILY MEDICINE CLINIC | Facility: CLINIC | Age: 75
End: 2020-08-17

## 2020-08-21 ENCOUNTER — PROCEDURE VISIT (OUTPATIENT)
Dept: UROLOGY | Facility: CLINIC | Age: 75
End: 2020-08-21
Payer: MEDICARE

## 2020-08-21 VITALS
TEMPERATURE: 97.3 F | WEIGHT: 162 LBS | BODY MASS INDEX: 23.99 KG/M2 | HEIGHT: 69 IN | DIASTOLIC BLOOD PRESSURE: 58 MMHG | HEART RATE: 70 BPM | SYSTOLIC BLOOD PRESSURE: 120 MMHG

## 2020-08-21 DIAGNOSIS — I25.10 CORONARY ARTERY DISEASE INVOLVING NATIVE CORONARY ARTERY OF NATIVE HEART WITHOUT ANGINA PECTORIS: ICD-10-CM

## 2020-08-21 DIAGNOSIS — R33.9 URINARY RETENTION: Primary | ICD-10-CM

## 2020-08-21 DIAGNOSIS — N13.8 BENIGN PROSTATIC HYPERPLASIA WITH URINARY OBSTRUCTION: ICD-10-CM

## 2020-08-21 DIAGNOSIS — E78.5 DYSLIPIDEMIA: ICD-10-CM

## 2020-08-21 DIAGNOSIS — N40.1 BENIGN PROSTATIC HYPERPLASIA WITH URINARY OBSTRUCTION: ICD-10-CM

## 2020-08-21 LAB
SL AMB  POCT GLUCOSE, UA: NORMAL
SL AMB LEUKOCYTE ESTERASE,UA: NORMAL
SL AMB POCT BILIRUBIN,UA: NORMAL
SL AMB POCT BLOOD,UA: NORMAL
SL AMB POCT CLARITY,UA: CLEAR
SL AMB POCT COLOR,UA: YELLOW
SL AMB POCT KETONES,UA: 5
SL AMB POCT NITRITE,UA: NORMAL
SL AMB POCT PH,UA: 6
SL AMB POCT SPECIFIC GRAVITY,UA: 1
SL AMB POCT URINE PROTEIN: NORMAL
SL AMB POCT UROBILINOGEN: 0.2

## 2020-08-21 PROCEDURE — 81002 URINALYSIS NONAUTO W/O SCOPE: CPT | Performed by: UROLOGY

## 2020-08-21 PROCEDURE — 52000 CYSTOURETHROSCOPY: CPT | Performed by: UROLOGY

## 2020-08-21 RX ORDER — FINASTERIDE 5 MG/1
5 TABLET, FILM COATED ORAL DAILY
Qty: 90 TABLET | Refills: 3 | Status: SHIPPED | OUTPATIENT
Start: 2020-08-21 | End: 2021-05-21 | Stop reason: SDUPTHER

## 2020-08-21 NOTE — PROGRESS NOTES
Cystoscopy    Date/Time: 8/21/2020 11:23 AM  Performed by: Charleen Irby MD  Authorized by: Charleen Irby MD     Procedure details: cystoscopy        Shannon Fisher is a 78-year-old male who has been seen in our office previously by advanced practitioners for BPH and urinary retention  His most recent PSA is from just over 2 years ago in January 2018  The PSA at that time was 3 5  Since then he has had intermittent urinary retention  He has had a catheter on 3 separate occasions  He is currently on tamsulosin  There is also been reported epididymitis  This was confirmed on ultrasound performed in April 2020  This issue has resolved  He presents today to undergo cystoscopy  Male cystoscopy procedure note:    Risk and benefits of flexible cystoscopy were discussed  Informed consent was obtained  The patient was placed in the supine position  His genitalia was prepped and draped in a sterile fashion  Viscous lidocaine jelly was instilled into the urethra and flexible cystoscopy was then performed  The urethra, prostatic urethra, and bladder were all thoroughly inspected   The bladder wall was thickened with multiple cellules  Lateral lobe hyperplasia of the prostate was appreciated  Significant intravesical protrusion of the prostate was noted  There were no mucosal abnormalities or lesions otherwise  Both ureteral orifices were visualized  Retroflexion showed significant intravesical protrusion without significant median lobe  Digital rectal examination revealed a 60-80 g prostate without nodularity    Impression:  BPH with intermittent obstruction/retention    Plan:  I recommend continuing tamsulosin 0 4 mg daily at bedtime  I recommend adding finasteride 5 mg daily  We discussed that if he were to have another episode of urinary retention that trans urethral resection of the prostate would be indicated  In the interim I will obtain a PSA level prior to starting finasteride    I will then repeat a PSA level prior to follow-up in the next 9 months with postvoid residual assessment and uroflow evaluation

## 2020-08-24 RX ORDER — METOPROLOL SUCCINATE 100 MG/1
TABLET, EXTENDED RELEASE ORAL
Qty: 30 TABLET | Refills: 11 | Status: SHIPPED | OUTPATIENT
Start: 2020-08-24 | End: 2021-09-08

## 2020-08-24 RX ORDER — ATORVASTATIN CALCIUM 80 MG/1
TABLET, FILM COATED ORAL
Qty: 30 TABLET | Refills: 11 | Status: SHIPPED | OUTPATIENT
Start: 2020-08-24 | End: 2021-09-08

## 2020-08-26 ENCOUNTER — OFFICE VISIT (OUTPATIENT)
Dept: FAMILY MEDICINE CLINIC | Facility: CLINIC | Age: 75
End: 2020-08-26
Payer: MEDICARE

## 2020-08-26 VITALS
BODY MASS INDEX: 24.26 KG/M2 | DIASTOLIC BLOOD PRESSURE: 60 MMHG | TEMPERATURE: 97.6 F | RESPIRATION RATE: 18 BRPM | WEIGHT: 163.8 LBS | OXYGEN SATURATION: 98 % | HEIGHT: 69 IN | HEART RATE: 70 BPM | SYSTOLIC BLOOD PRESSURE: 118 MMHG

## 2020-08-26 DIAGNOSIS — I73.9 PERIPHERAL ARTERIAL DISEASE (HCC): ICD-10-CM

## 2020-08-26 DIAGNOSIS — Z71.6 ENCOUNTER FOR SMOKING CESSATION COUNSELING: ICD-10-CM

## 2020-08-26 DIAGNOSIS — Z00.00 MEDICARE ANNUAL WELLNESS VISIT, SUBSEQUENT: Primary | ICD-10-CM

## 2020-08-26 PROBLEM — R33.9 URINARY RETENTION: Status: RESOLVED | Noted: 2019-02-13 | Resolved: 2020-08-26

## 2020-08-26 PROCEDURE — 1170F FXNL STATUS ASSESSED: CPT | Performed by: INTERNAL MEDICINE

## 2020-08-26 PROCEDURE — 3078F DIAST BP <80 MM HG: CPT | Performed by: INTERNAL MEDICINE

## 2020-08-26 PROCEDURE — 3008F BODY MASS INDEX DOCD: CPT | Performed by: INTERNAL MEDICINE

## 2020-08-26 PROCEDURE — 1125F AMNT PAIN NOTED PAIN PRSNT: CPT | Performed by: INTERNAL MEDICINE

## 2020-08-26 PROCEDURE — 1160F RVW MEDS BY RX/DR IN RCRD: CPT | Performed by: INTERNAL MEDICINE

## 2020-08-26 PROCEDURE — 4004F PT TOBACCO SCREEN RCVD TLK: CPT | Performed by: INTERNAL MEDICINE

## 2020-08-26 PROCEDURE — 3074F SYST BP LT 130 MM HG: CPT | Performed by: INTERNAL MEDICINE

## 2020-08-26 PROCEDURE — G0439 PPPS, SUBSEQ VISIT: HCPCS | Performed by: INTERNAL MEDICINE

## 2020-08-26 PROCEDURE — 4040F PNEUMOC VAC/ADMIN/RCVD: CPT | Performed by: INTERNAL MEDICINE

## 2020-08-26 RX ORDER — BUPROPION HYDROCHLORIDE 150 MG/1
150 TABLET, EXTENDED RELEASE ORAL 2 TIMES DAILY
Qty: 60 TABLET | Refills: 2 | Status: SHIPPED | OUTPATIENT
Start: 2020-08-26

## 2020-08-26 NOTE — PROGRESS NOTES
Assessment and Plan:     Problem List Items Addressed This Visit        Cardiovascular and Mediastinum    Peripheral arterial disease ([address] )      Other Visit Diagnoses     Medicare annual wellness visit, subsequent    -  Primary    Relevant Orders    Comprehensive metabolic panel    TSH, 3rd generation with Free T4 reflex    CBC and differential    Lipid panel    Vitamin D 25 hydroxy    UA (URINE) with reflex to Scope    Encounter for smoking cessation counseling        Relevant Medications    buPROPion (WELLBUTRIN SR) 150 mg 12 hr tablet      Patient feels okay, denies any complaints  Denies chest pain, palpitation, abdominal pain  He follows up with urology because of BPH he is currently on tamsulosin and fine as try that was started recently overall doing better  He also reported right knee pain which is chronic for him but overall pain is controlled and not limiting his ambulation  He smokes half a pack a day  He wants to quit  Discussed with the patient script for Wellbutrin 150 mg twice daily sent to the pharmacy  Patient denied low-dose CT scan screening for lung cancer  Patient had colonoscopy few months ago with removal of 2 beak polyps, he follows up with GI for his next colonoscopy in 6 months  He is active, walks a lot, trying to keep healthy diet  No history of recent falls  He lives in prison apartment with his wife  His last blood work was in 2018 will put regular blood work for him  Tobacco Cessation Counseling: Tobacco cessation counseling was provided  The patient is sincerely urged to quit consumption of tobacco  He is ready to quit tobacco  Medication options and side effects of medication discussed  Patient agreed to medication  Bupropion SR was prescribed  Preventive health issues were discussed with patient, and age appropriate screening tests were ordered as noted in patient's After Visit Summary    Personalized health advice and appropriate referrals for health education or preventive services given if needed, as noted in patient's After Visit Summary       History of Present Illness:     Patient presents for Medicare Annual Wellness visit    Patient Care Team:  Lisa Whittaker MD as PCP - General     Problem List:     Patient Active Problem List   Diagnosis    ASCVD (arteriosclerotic cardiovascular disease)    Osteopenia    Hearing loss    Hyperlipidemia    Inguinal hernia, right    Peripheral [facility] [address]    Polyp of sigmoid colon    Esophageal reflux disease    Epididymoorchitis      Past Medical and Surgical History:     Past Medical History:   Diagnosis Date    Coronary artery disease     Full dentures     upper and lower    GERD (gastroesophageal reflux disease)     Hyperlipidemia     Hypertension     Inguinal hernia 02/2018    right side    Myocardial infarction ([facility] [address] )     Pneumonia     Sciatica     Urinary retention 2/13/2019    Wears glasses      Past Surgical History:   Procedure Laterality Date    APPENDECTOMY      CORONARY ANGIOPLASTY WITH STENT PLACEMENT  05/2013    x1    CYSTOSCOPY  08/21/2020    HERNIA REPAIR Left     inguinal hernia     LAPAROSCOPIC INGUINAL HERNIA REPAIR Right 02/08/2018    ROBOTIC REPAIR WITH MESH-MANASA TAPIA MD    TONSILLECTOMY      WISDOM TOOTH EXTRACTION        Family History:     Family History   Problem Relation Age of Onset    Gallbladder disease Mother       Social History:        Social History     Socioeconomic History    Marital status: /Civil Union     Spouse name: Not on file    Number of children: Not on file    Years of education: Not on file    Highest education level: Not on file   Occupational History    Not on file   Social Needs    Financial resource strain: Not on file    Food insecurity     Worry: Not on file     Inability: Not on file    Transportation needs     Medical: Not on file     Non-medical: Not on file   Tobacco Use    Smoking status: Current Every Day Smoker     Packs/day: 0 50     Years: 40 00     Pack years: 20 00    Smokeless tobacco: Never Used   Substance and Sexual Activity    Alcohol use: Yes     Alcohol/week: 10 0 standard drinks     Types: 10 Cans of beer per week     Comment: social     Drug use: No    Sexual activity: Not on file   Lifestyle    Physical activity     Days per week: Not on file     Minutes per session: Not on file    Stress: Not on file   Relationships    Social connections     Talks on phone: Not on file     Gets together: Not on file     Attends Christian service: Not on file     Active member of club or organization: Not on file     Attends meetings of clubs or organizations: Not on file     Relationship status: Not on file    Intimate partner violence     Fear of current or ex partner: Not on file     Emotionally abused: Not on file     Physically abused: Not on file     Forced sexual activity: Not on file   Other Topics Concern    Not on file   Social History Narrative    Not on file      Medications and Allergies:     Current Outpatient Medications   Medication Sig Dispense Refill    aspirin 81 mg chewable tablet Chew 81 mg daily      atorvastatin (LIPITOR) 80 mg tablet TAKE 1 TABLET BY MOUTH EVERY DAY 30 tablet 11    ezetimibe (ZETIA) 10 mg tablet TAKE 1 TABLET BY MOUTH EVERY DAY 30 tablet 14    finasteride (PROSCAR) 5 mg tablet Take 1 tablet (5 mg total) by mouth daily 90 tablet 3    losartan (COZAAR) 25 mg tablet TAKE 1 TABLET BY MOUTH EVERY DAY 30 tablet 5    metoprolol succinate (TOPROL-XL) 100 mg 24 hr tablet TAKE 1 TABLET BY MOUTH EVERY DAY 30 tablet 11    tamsulosin (FLOMAX) 0 4 mg Take 1 capsule (0 4 mg total) by mouth daily with dinner 30 capsule 11    buPROPion (WELLBUTRIN SR) 150 mg 12 hr tablet Take 1 tablet (150 mg total) by mouth 2 (two) times a day 60 tablet 2     No current facility-administered medications for this visit        No Known Allergies   Immunizations:     Immunization History Administered Date(s) Administered    INFLUENZA 11/02/2017, 09/15/2018, 10/08/2019    Influenza Split High Dose Preservative Free IM 09/23/2014, 10/01/2017    Pneumococcal Conjugate 13-Valent 01/05/2018    Pneumococcal Polysaccharide PPV23 09/23/2014      Health Maintenance:         Topic Date Due    Hepatitis C Screening  1945         Topic Date Due    DTaP,Tdap,and Td Vaccines (1 - Tdap) 11/20/1967    Influenza Vaccine  07/01/2020      Medicare Health Risk Assessment:     /60   Pulse 70   Temp 97 6 °F (36 4 °C)   Resp 18   Ht 5' 9" (1 753 m)   Wt 74 3 kg (163 lb 12 8 oz)   SpO2 98%   BMI 24 19 kg/m²      Annual Wellness Visit    Missy Greene MD

## 2020-08-26 NOTE — PROGRESS NOTES
Assessment and Plan:     Problem List Items Addressed This Visit     None           Preventive health issues were discussed with patient, and age appropriate screening tests were ordered as noted in patient's After Visit Summary  Personalized health advice and appropriate referrals for health education or preventive services given if needed, as noted in patient's After Visit Summary       History of Present Illness:     Patient presents for Medicare Annual Wellness visit    Patient Care Team:  Kirk Duran MD as PCP - General     Problem List:     Patient Active Problem List   Diagnosis    Urinary retention    ASCVD (arteriosclerotic cardiovascular disease)    Osteopenia    Hearing loss    Hyperlipidemia    Inguinal hernia, right    Peripheral arterial disease ([address] )    Polyp of sigmoid colon    Esophageal reflux disease    Epididymoorchitis      Past Medical and Surgical History:     Past Medical History:   Diagnosis Date    Coronary artery disease     Full dentures     upper and lower    GERD (gastroesophageal reflux disease)     Hyperlipidemia     Hypertension     Inguinal hernia 02/2018    right side    Myocardial infarction ([address] )     Pneumonia     Sciatica     Wears glasses      Past Surgical History:   Procedure Laterality Date    APPENDECTOMY      CORONARY ANGIOPLASTY WITH STENT PLACEMENT  05/2013    x1    CYSTOSCOPY  08/21/2020    HERNIA REPAIR Left     inguinal hernia     LAPAROSCOPIC INGUINAL HERNIA REPAIR Right 02/08/2018    ROBOTIC REPAIR WITH MESH-MANASA TAPIA MD    TONSILLECTOMY      WISDOM TOOTH EXTRACTION        Family History:     Family History   Problem Relation Age of Onset    Gallbladder disease Mother       Social History:        Social History     Socioeconomic History    Marital status: /Civil Union     Spouse name: Not on file    Number of children: Not on file    Years of education: Not on file    Highest education level: Not on file Occupational History    Not on file   Social Needs    Financial resource strain: Not on file    Food insecurity     Worry: Not on file     Inability: Not on file    Transportation needs     Medical: Not on file     Non-medical: Not on file   Tobacco Use    Smoking status: Current Every Day Smoker     Packs/day: 0 50     Years: 40 00     Pack years: 20 00    Smokeless tobacco: Never Used   Substance and Sexual Activity    Alcohol use:  Yes     Alcohol/week: 10 0 standard drinks     Types: 10 Cans of beer per week     Comment: social     Drug use: No    Sexual activity: Not on file   Lifestyle    Physical activity     Days per week: Not on file     Minutes per session: Not on file    Stress: Not on file   Relationships    Social connections     Talks on phone: Not on file     Gets together: Not on file     Attends Rastafarian service: Not on file     Active member of club or organization: Not on file     Attends meetings of clubs or organizations: Not on file     Relationship status: Not on file    Intimate partner violence     Fear of current or ex partner: Not on file     Emotionally abused: Not on file     Physically abused: Not on file     Forced sexual activity: Not on file   Other Topics Concern    Not on file   Social History Narrative    Not on file      Medications and Allergies:     Current Outpatient Medications   Medication Sig Dispense Refill    aspirin 81 mg chewable tablet Chew 81 mg daily      atorvastatin (LIPITOR) 80 mg tablet TAKE 1 TABLET BY MOUTH EVERY DAY 30 tablet 11    ezetimibe (ZETIA) 10 mg tablet TAKE 1 TABLET BY MOUTH EVERY DAY 30 tablet 14    finasteride (PROSCAR) 5 mg tablet Take 1 tablet (5 mg total) by mouth daily 90 tablet 3    losartan (COZAAR) 25 mg tablet TAKE 1 TABLET BY MOUTH EVERY DAY 30 tablet 5    metoprolol succinate (TOPROL-XL) 100 mg 24 hr tablet TAKE 1 TABLET BY MOUTH EVERY DAY 30 tablet 11    tamsulosin (FLOMAX) 0 4 mg Take 1 capsule (0 4 mg total) by mouth daily with dinner 30 capsule 11     No current facility-administered medications for this visit  No Known Allergies   Immunizations:     Immunization History   Administered Date(s) Administered    INFLUENZA 11/02/2017, 09/15/2018, 10/08/2019    Influenza Split High Dose Preservative Free IM 09/23/2014, 10/01/2017    Pneumococcal Conjugate 13-Valent 01/05/2018    Pneumococcal Polysaccharide PPV23 09/23/2014      Health Maintenance:         Topic Date Due    Hepatitis C Screening  1945         Topic Date Due    DTaP,Tdap,and Td Vaccines (1 - Tdap) 11/20/1967    Influenza Vaccine  07/01/2020      Medicare Health Risk Assessment:     /60   Pulse 70   Temp 97 6 °F (36 4 °C)   Resp 18   Ht 5' 9" (1 753 m)   Wt 74 3 kg (163 lb 12 8 oz)   SpO2 98%   BMI 24 19 kg/m²      Lolita Parker is here for his Subsequent Wellness visit  Health Risk Assessment:   Patient rates overall health as good  Patient feels that their physical health rating is same  Eyesight was rated as same  Hearing was rated as slightly worse  Patient feels that their emotional and mental health rating is same  Pain experienced in the last 7 days has been none  Patient states that he has experienced no weight loss or gain in last 6 months  Depression Screening:   PHQ-2 Score: 0      Fall Risk Screening: In the past year, patient has experienced: no history of falling in past year      Home Safety:  Patient does not have trouble with stairs inside or outside of their home  Patient has working smoke alarms and has working carbon monoxide detector  Home safety hazards include: none  Nutrition:   Current diet is Regular and Limited junk food  Medications:   Patient is not currently taking any over-the-counter supplements  Patient is able to manage medications       Activities of Daily Living (ADLs)/Instrumental Activities of Daily Living (IADLs):   Walk and transfer into and out of bed and chair?: Yes  Dress and groom yourself?: Yes    Bathe or shower yourself?: Yes    Feed yourself? Yes  Do your laundry/housekeeping?: Yes  Manage your money, pay your bills and track your expenses?: Yes  Make your own meals?: Yes    Do your own shopping?: Yes    Previous Hospitalizations:   Any hospitalizations or ED visits within the last 12 months?: No      Advance Care Planning:   Living will: Yes    Durable POA for healthcare:  Yes    Advanced directive: Yes      PREVENTIVE SCREENINGS      Cardiovascular Screening:    General: Screening Not Indicated and History Lipid Disorder      Colorectal Cancer Screening:     General: Screening Current      Abdominal Aortic Aneurysm (AAA) Screening:    Risk factors include: age between 73-67 yo and tobacco use        Lung Cancer Screening:     General: Screening Not Indicated      Breanne Draper MD

## 2020-08-26 NOTE — PATIENT INSTRUCTIONS
Medicare Preventive Visit Patient Instructions  Thank you for completing your Welcome to Medicare Visit or Medicare Annual Wellness Visit today  Your next wellness visit will be due in one year (8/26/2021)  The screening/preventive services that you may require over the next 5-10 years are detailed below  Some tests may not apply to you based off risk factors and/or age  Screening tests ordered at today's visit but not completed yet may show as past due  Also, please note that scanned in results may not display below  Preventive Screenings:  Service Recommendations Previous Testing/Comments   Colorectal Cancer Screening  · Colonoscopy    · Fecal Occult Blood Test (FOBT)/Fecal Immunochemical Test (FIT)  · Fecal DNA/Cologuard Test  · Flexible Sigmoidoscopy Age: 54-65 years old   Colonoscopy: every 10 years (May be performed more frequently if at higher risk)  OR  FOBT/FIT: every 1 year  OR  Cologuard: every 3 years  OR  Sigmoidoscopy: every 5 years  Screening may be recommended earlier than age 48 if at higher risk for colorectal cancer  Also, an individualized decision between you and your healthcare provider will decide whether screening between the ages of 74-80 would be appropriate   Colonoscopy: 03/17/2020  FOBT/FIT: Not on file  Cologuard: Not on file  Sigmoidoscopy: Not on file    Screening Current     Prostate Cancer Screening Individualized decision between patient and health care provider in men between ages of 53-78   Medicare will cover every 12 months beginning on the day after your 50th birthday PSA: 3 5 ng/mL          Hepatitis C Screening Once for adults born between 1945 and 1965  More frequently in patients at high risk for Hepatitis C Hep C Antibody: Not on file       Diabetes Screening 1-2 times per year if you're at risk for diabetes or have pre-diabetes Fasting glucose: 103 mg/dL   A1C: No results in last 5 years       Cholesterol Screening Once every 5 years if you don't have a lipid disorder  May order more often based on risk factors  Lipid panel: 01/24/2018    Screening Not Indicated  History Lipid Disorder      Other Preventive Screenings Covered by Medicare:  1  Abdominal Aortic Aneurysm (AAA) Screening: covered once if your at risk  You're considered to be at risk if you have a family history of AAA or a male between the age of 73-68 who smoking at least 100 cigarettes in your lifetime  2  Lung Cancer Screening: covers low dose CT scan once per year if you meet all of the following conditions: (1) Age 50-69; (2) No signs or symptoms of lung cancer; (3) Current smoker or have quit smoking within the last 15 years; (4) You have a tobacco smoking history of at least 30 pack years (packs per day x number of years you smoked); (5) You get a written order from a healthcare provider  3  Glaucoma Screening: covered annually if you're considered high risk: (1) You have diabetes OR (2) Family history of glaucoma OR (3)  aged [address] and older OR (3)  American aged 72 and older  3  Osteoporosis Screening: covered every 2 years if you meet one of the following conditions: (1) Have a vertebral abnormality; (2) On glucocorticoid therapy for more than 3 months; (3) Have primary hyperparathyroidism; (4) On osteoporosis medications and need to assess response to drug therapy  5  HIV Screening: covered annually if you're between the age of 12-76  Also covered annually if you are younger than 13 and older than 72 with risk factors for HIV infection  For pregnant patients, it is covered up to 3 times per pregnancy      Immunizations:  Immunization Recommendations   Influenza Vaccine Annual influenza vaccination during flu season is recommended for all persons aged >= 6 months who do not have contraindications   Pneumococcal Vaccine (Prevnar and Pneumovax)  * Prevnar = PCV13  * Pneumovax = PPSV23 Adults 25-60 years old: 1-3 doses may be recommended based on certain risk factors  Adults 65+ years old: Prevnar (PCV13) vaccine recommended followed by Pneumovax (PPSV23) vaccine  If already received PPSV23 since turning 65, then PCV13 recommended at least one year after PPSV23 dose  Hepatitis B Vaccine 3 dose series if at intermediate or high risk (ex: diabetes, end stage renal disease, liver disease)   Tetanus (Td) Vaccine - COST NOT COVERED BY MEDICARE PART B Following completion of primary series, a booster dose should be given every 10 years to maintain immunity against tetanus  Td may also be given as tetanus wound prophylaxis  Tdap Vaccine - COST NOT COVERED BY MEDICARE PART B Recommended at least once for all adults  For pregnant patients, recommended with each pregnancy  Shingles Vaccine (Shingrix) - COST NOT COVERED BY MEDICARE PART B  2 shot series recommended in those aged 48 and above     Health Maintenance Due:      Topic Date Due    Hepatitis C Screening  1945     Immunizations Due:      Topic Date Due    DTaP,Tdap,and Td Vaccines (1 - Tdap) 11/20/1967    Influenza Vaccine  07/01/2020     Advance Directives   What are advance directives? Advance directives are legal documents that state your wishes and plans for medical care  These plans are made ahead of time in case you lose your ability to make decisions for yourself  Advance directives can apply to any medical decision, such as the treatments you want, and if you want to donate organs  What are the types of advance directives? There are many types of advance directives, and each state has rules about how to use them  You may choose a combination of any of the following:  · Living will: This is a written record of the treatment you want  You can also choose which treatments you do not want, which to limit, and which to stop at a certain time  This includes surgery, medicine, IV fluid, and tube feedings  · Durable power of  for healthcare [facility]):   This is a written record that states who you want to make healthcare choices for you when you are unable to make them for yourself  This person, called a proxy, is usually a family member or a friend  You may choose more than 1 proxy  · Do not resuscitate (DNR) order:  A DNR order is used in case your heart stops beating or you stop breathing  It is a request not to have certain forms of treatment, such as CPR  A DNR order may be included in other types of advance directives  · Medical directive: This covers the care that you want if you are in a coma, near death, or unable to make decisions for yourself  You can list the treatments you want for each condition  Treatment may include pain medicine, surgery, blood transfusions, dialysis, IV or tube feedings, and a ventilator (breathing machine)  · Values history: This document has questions about your views, beliefs, and how you feel and think about life  This information can help others choose the care that you would choose  Why are advance directives important? An advance directive helps you control your care  Although spoken wishes may be used, it is better to have your wishes written down  Spoken wishes can be misunderstood, or not followed  Treatments may be given even if you do not want them  An advance directive may make it easier for your family to make difficult choices about your care  Cigarette Smoking and Your Health   Risks to your health if you smoke:  Nicotine and other chemicals found in tobacco damage every cell in your body  Even if you are a light smoker, you have an increased risk for cancer, heart disease, and lung disease  If you are pregnant or have diabetes, smoking increases your risk for complications  Benefits to your health if you stop smoking:   · You decrease respiratory symptoms such as coughing, wheezing, and shortness of breath  · You reduce your risk for cancers of the lung, mouth, throat, kidney, bladder, pancreas, stomach, and cervix   If you already have cancer, you increase the benefits of chemotherapy  You also reduce your risk for cancer returning or a second cancer from developing  · You reduce your risk for heart disease, blood clots, heart attack, and stroke  · You reduce your risk for lung infections, and diseases such as pneumonia, asthma, chronic bronchitis, and emphysema  · Your circulation improves  More oxygen can be delivered to your body  If you have diabetes, you lower your risk for complications, such as kidney, artery, and eye diseases  You also lower your risk for nerve damage  Nerve damage can lead to amputations, poor vision, and blindness  · You improve your body's ability to heal and to fight infections  For more information and support to stop smoking:   · mySugr  gov  Phone: 8- 203 - 093-7380  Web Address: www [employer]   [address] Information is for End User's use only and may not be sold, redistributed or otherwise used for commercial purposes  All illustrations and images included in CareNotes® are the copyrighted property of [employer]  or [facility] Preventive Visit Patient Instructions  Thank you for completing your Welcome to Medicare Visit or Medicare Annual Wellness Visit today  Your next wellness visit will be due in one year (8/26/2021)  The screening/preventive services that you may require over the next 5-10 years are detailed below  Some tests may not apply to you based off risk factors and/or age  Screening tests ordered at today's visit but not completed yet may show as past due  Also, please note that scanned in results may not display below    Preventive Screenings:  Service Recommendations Previous Testing/Comments   Colorectal Cancer Screening  · Colonoscopy    · Fecal Occult Blood Test (FOBT)/Fecal Immunochemical Test (FIT)  · Fecal DNA/Cologuard Test  · Flexible Sigmoidoscopy Age: 54-65 years old   Colonoscopy: every 10 years (May be performed more frequently if at higher risk) OR  FOBT/FIT: every 1 year  OR  Cologuard: every 3 years  OR  Sigmoidoscopy: every 5 years  Screening may be recommended earlier than age 48 if at higher risk for colorectal cancer  Also, an individualized decision between you and your healthcare provider will decide whether screening between the ages of 74-80 would be appropriate  Colonoscopy: 03/17/2020  FOBT/FIT: Not on file  Cologuard: Not on file  Sigmoidoscopy: Not on file    Screening Current     Prostate Cancer Screening Individualized decision between patient and health care provider in men between ages of 53-78   Medicare will cover every 12 months beginning on the day after your 50th birthday PSA: 3 5 ng/mL          Hepatitis C Screening Once for adults born between 1945 and 1965  More frequently in patients at high risk for Hepatitis C Hep C Antibody: Not on file       Diabetes Screening 1-2 times per year if you're at risk for diabetes or have pre-diabetes Fasting glucose: 103 mg/dL   A1C: No results in last 5 years       Cholesterol Screening Once every 5 years if you don't have a lipid disorder  May order more often based on risk factors  Lipid panel: 01/24/2018    Screening Not Indicated  History Lipid Disorder      Other Preventive Screenings Covered by Medicare:  6  Abdominal Aortic Aneurysm (AAA) Screening: covered once if your at risk  You're considered to be at risk if you have a family history of AAA or a male between the age of 73-68 who smoking at least 100 cigarettes in your lifetime  7  Lung Cancer Screening: covers low dose CT scan once per year if you meet all of the following conditions: (1) Age 50-69; (2) No signs or symptoms of lung cancer; (3) Current smoker or have quit smoking within the last 15 years; (4) You have a tobacco smoking history of at least 30 pack years (packs per day x number of years you smoked); (5) You get a written order from a healthcare provider    8  Glaucoma Screening: covered annually if you're considered high risk: (1) You have diabetes OR (2) Family history of glaucoma OR (3)  aged 48 and older OR (4)  American aged 72 and older  5  Osteoporosis Screening: covered every 2 years if you meet one of the following conditions: (1) Have a vertebral abnormality; (2) On glucocorticoid therapy for more than 3 months; (3) Have primary hyperparathyroidism; (4) On osteoporosis medications and need to assess response to drug therapy  10  HIV Screening: covered annually if you're between the age of 12-76  Also covered annually if you are younger than 13 and older than 72 with risk factors for HIV infection  For pregnant patients, it is covered up to 3 times per pregnancy  Immunizations:  Immunization Recommendations   Influenza Vaccine Annual influenza vaccination during flu season is recommended for all persons aged >= 6 months who do not have contraindications   Pneumococcal Vaccine (Prevnar and Pneumovax)  * Prevnar = PCV13  * Pneumovax = PPSV23 Adults 25-60 years old: 1-3 doses may be recommended based on certain risk factors  Adults 72 years old: Prevnar (PCV13) vaccine recommended followed by Pneumovax (PPSV23) vaccine  If already received PPSV23 since turning 65, then PCV13 recommended at least one year after PPSV23 dose  Hepatitis B Vaccine 3 dose series if at intermediate or high risk (ex: diabetes, end stage renal disease, liver disease)   Tetanus (Td) Vaccine - COST NOT COVERED BY MEDICARE PART B Following completion of primary series, a booster dose should be given every 10 years to maintain immunity against tetanus  Td may also be given as tetanus wound prophylaxis  Tdap Vaccine - COST NOT COVERED BY MEDICARE PART B Recommended at least once for all adults  For pregnant patients, recommended with each pregnancy     Shingles Vaccine (Shingrix) - COST NOT COVERED BY MEDICARE PART B  2 shot series recommended in those aged 48 and above     Health Maintenance Due:      Topic Date Due    Hepatitis C Screening  1945     Immunizations Due:      Topic Date Due    DTaP,Tdap,and Td Vaccines (1 - Tdap) 11/20/1967    Influenza Vaccine  07/01/2020     Advance Directives   What are advance directives? Advance directives are legal documents that state your wishes and plans for medical care  These plans are made ahead of time in case you lose your ability to make decisions for yourself  Advance directives can apply to any medical decision, such as the treatments you want, and if you want to donate organs  What are the types of advance directives? There are many types of advance directives, and each state has rules about how to use them  You may choose a combination of any of the following:  · Living will: This is a written record of the treatment you want  You can also choose which treatments you do not want, which to limit, and which to stop at a certain time  This includes surgery, medicine, IV fluid, and tube feedings  · Durable power of  for healthcare [facility]): This is a written record that states who you want to make healthcare choices for you when you are unable to make them for yourself  This person, called a proxy, is usually a family member or a friend  You may choose more than 1 proxy  · Do not resuscitate (DNR) order:  A DNR order is used in case your heart stops beating or you stop breathing  It is a request not to have certain forms of treatment, such as CPR  A DNR order may be included in other types of advance directives  · Medical directive: This covers the care that you want if you are in a coma, near death, or unable to make decisions for yourself  You can list the treatments you want for each condition  Treatment may include pain medicine, surgery, blood transfusions, dialysis, IV or tube feedings, and a ventilator (breathing machine)  · Values history: This document has questions about your views, beliefs, and how you feel and think about life   This information can help others choose the care that you would choose  Why are advance directives important? An advance directive helps you control your care  Although spoken wishes may be used, it is better to have your wishes written down  Spoken wishes can be misunderstood, or not followed  Treatments may be given even if you do not want them  An advance directive may make it easier for your family to make difficult choices about your care  Cigarette Smoking and Your Health   Risks to your health if you smoke:  Nicotine and other chemicals found in tobacco damage every cell in your body  Even if you are a light smoker, you have an increased risk for cancer, heart disease, and lung disease  If you are pregnant or have diabetes, smoking increases your risk for complications  Benefits to your health if you stop smoking:   · You decrease respiratory symptoms such as coughing, wheezing, and shortness of breath  · You reduce your risk for cancers of the lung, mouth, throat, kidney, bladder, pancreas, stomach, and cervix  If you already have cancer, you increase the benefits of chemotherapy  You also reduce your risk for cancer returning or a second cancer from developing  · You reduce your risk for heart disease, blood clots, heart attack, and stroke  · You reduce your risk for lung infections, and diseases such as pneumonia, asthma, chronic bronchitis, and emphysema  · Your circulation improves  More oxygen can be delivered to your body  If you have diabetes, you lower your risk for complications, such as kidney, artery, and eye diseases  You also lower your risk for nerve damage  Nerve damage can lead to amputations, poor vision, and blindness  · You improve your body's ability to heal and to fight infections  For more information and support to stop smoking:   · Smokefree  gov  Phone: 0- 928 - 551-8129  Web Address: [employer]   [address] 2018 Information is for End User's use only and may not be sold, redistributed or otherwise used for commercial purposes   All illustrations and images included in CareNotes® are the copyrighted property of [employer]  or [facility] Shellie Padgett

## 2021-02-19 DIAGNOSIS — R33.9 URINARY RETENTION: ICD-10-CM

## 2021-02-19 RX ORDER — TAMSULOSIN HYDROCHLORIDE 0.4 MG/1
CAPSULE ORAL
Qty: 30 CAPSULE | Refills: 11 | Status: SHIPPED | OUTPATIENT
Start: 2021-02-19 | End: 2021-04-27 | Stop reason: SDUPTHER

## 2021-03-23 DIAGNOSIS — I10 HTN (HYPERTENSION), BENIGN: Primary | ICD-10-CM

## 2021-03-23 RX ORDER — LOSARTAN POTASSIUM 50 MG/1
TABLET ORAL
Qty: 15 TABLET | Refills: 4 | Status: SHIPPED | OUTPATIENT
Start: 2021-03-23 | End: 2022-01-10 | Stop reason: SDUPTHER

## 2021-03-24 PROCEDURE — 88305 TISSUE EXAM BY PATHOLOGIST: CPT | Performed by: PATHOLOGY

## 2021-03-25 ENCOUNTER — LAB REQUISITION (OUTPATIENT)
Dept: LAB | Facility: HOSPITAL | Age: 76
End: 2021-03-25
Payer: MEDICARE

## 2021-03-25 DIAGNOSIS — Z12.11 ENCOUNTER FOR SCREENING FOR MALIGNANT NEOPLASM OF COLON: ICD-10-CM

## 2021-03-25 DIAGNOSIS — Z86.010 PERSONAL HISTORY OF COLONIC POLYPS: ICD-10-CM

## 2021-03-30 DIAGNOSIS — Z23 ENCOUNTER FOR IMMUNIZATION: ICD-10-CM

## 2021-04-16 ENCOUNTER — HOSPITAL ENCOUNTER (EMERGENCY)
Facility: HOSPITAL | Age: 76
Discharge: HOME | End: 2021-04-16
Attending: EMERGENCY MEDICINE | Admitting: EMERGENCY MEDICINE
Payer: MEDICARE

## 2021-04-16 VITALS
HEART RATE: 87 BPM | OXYGEN SATURATION: 97 % | DIASTOLIC BLOOD PRESSURE: 65 MMHG | SYSTOLIC BLOOD PRESSURE: 129 MMHG | RESPIRATION RATE: 18 BRPM | TEMPERATURE: 98.1 F

## 2021-04-16 DIAGNOSIS — N39.0 UTI (URINARY TRACT INFECTION): ICD-10-CM

## 2021-04-16 DIAGNOSIS — R10.30 LOWER ABDOMINAL PAIN: Primary | ICD-10-CM

## 2021-04-16 DIAGNOSIS — R33.8 ACUTE URINARY RETENTION: ICD-10-CM

## 2021-04-16 LAB
BACTERIA UR QL AUTO: ABNORMAL /HPF
BILIRUB UR QL STRIP: NEGATIVE
CLARITY UR: CLEAR
COLOR UR: YELLOW
GLUCOSE UR STRIP-MCNC: NEGATIVE MG/DL
HGB UR QL STRIP.AUTO: ABNORMAL
KETONES UR STRIP-MCNC: NEGATIVE MG/DL
LEUKOCYTE ESTERASE UR QL STRIP: ABNORMAL
NITRITE UR QL STRIP: POSITIVE
NON-SQ EPI CELLS URNS QL MICRO: ABNORMAL /HPF
PH UR STRIP.AUTO: 5.5 [PH]
PROT UR STRIP-MCNC: NEGATIVE MG/DL
RBC #/AREA URNS AUTO: ABNORMAL /HPF
SP GR UR STRIP.AUTO: 1.01 (ref 1–1.03)
UROBILINOGEN UR QL STRIP.AUTO: 0.2 E.U./DL
WBC #/AREA URNS AUTO: ABNORMAL /HPF

## 2021-04-16 PROCEDURE — 99284 EMERGENCY DEPT VISIT MOD MDM: CPT | Performed by: PHYSICIAN ASSISTANT

## 2021-04-16 PROCEDURE — 81001 URINALYSIS AUTO W/SCOPE: CPT | Performed by: PHYSICIAN ASSISTANT

## 2021-04-16 PROCEDURE — 99284 EMERGENCY DEPT VISIT MOD MDM: CPT

## 2021-04-16 RX ORDER — CEPHALEXIN 500 MG/1
500 CAPSULE ORAL 2 TIMES DAILY
Qty: 14 CAPSULE | Refills: 0 | Status: SHIPPED | OUTPATIENT
Start: 2021-04-16 | End: 2021-04-23

## 2021-04-16 RX ORDER — LIDOCAINE HYDROCHLORIDE 20 MG/ML
1 JELLY TOPICAL ONCE
Status: COMPLETED | OUTPATIENT
Start: 2021-04-16 | End: 2021-04-16

## 2021-04-16 RX ORDER — CEPHALEXIN 250 MG/1
500 CAPSULE ORAL ONCE
Status: COMPLETED | OUTPATIENT
Start: 2021-04-16 | End: 2021-04-16

## 2021-04-16 RX ADMIN — LIDOCAINE HYDROCHLORIDE 1 APPLICATION: 20 JELLY TOPICAL at 21:11

## 2021-04-16 RX ADMIN — CEPHALEXIN 500 MG: 250 CAPSULE ORAL at 22:21

## 2021-04-17 NOTE — ED PROVIDER NOTES
History  Chief Complaint   Patient presents with    Abdominal Pain     Pt states "I have a blockage " Pt reports hx of same, urinary cath placed, now with LLQ pain, began yesterday  80-year-old male with past medical history significant for hypertension, hyperlipidemia, BPH with urinary retention, epididymo-orchitis, inguinal hernia who presents to the emergency department for complaint of lower abdominal pain, distention, and acute urinary retention  Patient endorses past episodes of acute urinary retention requiring Gupta catheter placement  He has established follow-up with Urology  He takes Flomax daily  He last urinated normally yesterday  He was able to urinate a few drops just before  He denies any preceding hematuria, dysuria, or flank pain  He endorses progressive lower abdominal discomfort and bloating  He denies any fever or chills, nausea or vomiting, constipation or diarrhea, back pain, dizziness  He denies any new medications or supplements  Prior to Admission Medications   Prescriptions Last Dose Informant Patient Reported?  Taking?   aspirin 81 mg chewable tablet  Self Yes No   Sig: Chew 81 mg daily   atorvastatin (LIPITOR) 80 mg tablet   No No   Sig: TAKE 1 TABLET BY MOUTH EVERY DAY   buPROPion (WELLBUTRIN SR) 150 mg 12 hr tablet   No No   Sig: Take 1 tablet (150 mg total) by mouth 2 (two) times a day   ezetimibe (ZETIA) 10 mg tablet  Self No No   Sig: TAKE 1 TABLET BY MOUTH EVERY DAY   finasteride (PROSCAR) 5 mg tablet   No No   Sig: Take 1 tablet (5 mg total) by mouth daily   losartan (COZAAR) 25 mg tablet  Self No No   Sig: TAKE 1 TABLET BY MOUTH EVERY DAY   losartan (COZAAR) 50 mg tablet   No No   Sig: TAKE 1/2 TABLET BY MOUTH DAILY   metoprolol succinate (TOPROL-XL) 100 mg 24 hr tablet   No No   Sig: TAKE 1 TABLET BY MOUTH EVERY DAY   tamsulosin (FLOMAX) 0 4 mg   No No   Sig: TAKE 1 CAPSULE BY MOUTH EVERY DAY WITH DINNER      Facility-Administered Medications: None Past Medical History:   Diagnosis Date    Coronary artery disease     Full dentures     upper and lower    GERD (gastroesophageal reflux disease)     Hyperlipidemia     Hypertension     Inguinal hernia 02/2018    right side    Myocardial infarction ([address] )     Pneumonia     Sciatica     Urinary retention 2/13/2019    Wears glasses        Past Surgical History:   Procedure Laterality Date    APPENDECTOMY      CORONARY ANGIOPLASTY WITH STENT PLACEMENT  05/2013    x1    CYSTOSCOPY  08/21/2020    HERNIA REPAIR Left     inguinal hernia     LAPAROSCOPIC INGUINAL HERNIA REPAIR Right 02/08/2018    ROBOTIC REPAIR WITH MESH-MANASA TAPIA MD    TONSILLECTOMY      WISDOM TOOTH EXTRACTION         Family History   Problem Relation Age of Onset    Gallbladder disease Mother      I have reviewed and agree with the history as documented  E-Cigarette/Vaping    E-Cigarette Use Never User      E-Cigarette/Vaping Substances     Social History     Tobacco Use    Smoking status: Current Every Day Smoker     Packs/day: 0 50     Years: 40 00     Pack years: 20 00    Smokeless tobacco: Never Used   Substance Use Topics    Alcohol use: Yes     Alcohol/week: 10 0 standard drinks     Types: 10 Cans of beer per week     Comment: social     Drug use: No       Review of Systems   Constitutional: Negative for appetite change, chills, fatigue, fever and unexpected weight change  Respiratory: Negative for shortness of breath  Cardiovascular: Negative for chest pain  Gastrointestinal: Positive for abdominal distention and abdominal pain  Negative for constipation, diarrhea, nausea and vomiting  Genitourinary: Positive for decreased urine volume and difficulty urinating  Negative for discharge, dysuria, flank pain, frequency, hematuria, penile pain, penile swelling, scrotal swelling, testicular pain and urgency  Musculoskeletal: Negative for back pain and myalgias  Skin: Negative for color change and rash  Neurological: Negative for dizziness, weakness, light-headedness and headaches  Hematological: Negative for adenopathy  All other systems reviewed and are negative  Physical Exam  Physical Exam  Vitals signs reviewed  Constitutional:       General: He is awake  He is not in acute distress  Appearance: Normal appearance  He is well-developed and normal weight  He is not ill-appearing or toxic-appearing  HENT:      Head: Normocephalic and atraumatic  Mouth/Throat:      Lips: Pink  Mouth: Mucous membranes are moist       Pharynx: Oropharynx is clear  Uvula midline  Eyes:      Extraocular Movements: Extraocular movements intact  Conjunctiva/sclera: Conjunctivae normal       Pupils: Pupils are equal, round, and reactive to light  Neck:      Musculoskeletal: Normal range of motion and neck supple  Cardiovascular:      Rate and Rhythm: Normal rate and regular rhythm  Pulses: Normal pulses  Pulmonary:      Effort: Pulmonary effort is normal       Breath sounds: Normal breath sounds  Abdominal:      General: Bowel sounds are normal  There is no distension  Palpations: Abdomen is soft  There is no hepatomegaly, splenomegaly or mass  Tenderness: There is abdominal tenderness in the right lower quadrant, suprapubic area and left lower quadrant  There is no right CVA tenderness, left CVA tenderness, guarding or rebound  Hernia: No hernia is present  Musculoskeletal: Normal range of motion  Skin:     General: Skin is warm  Capillary Refill: Capillary refill takes less than 2 seconds  Findings: No erythema, lesion or rash  Neurological:      Mental Status: He is alert and oriented to person, place, and time           Vital Signs  ED Triage Vitals   Temperature Pulse Respirations Blood Pressure SpO2   04/16/21 1843 04/16/21 1843 04/16/21 1843 04/16/21 1843 04/16/21 1843   98 1 °F (36 7 °C) (!) 109 18 (!) 194/93 96 %      Temp Source Heart Rate Source Patient Position - Orthostatic VS BP Location FiO2 (%)   04/16/21 1843 04/16/21 2046 04/16/21 1843 04/16/21 1843 --   Oral Monitor Standing Right arm       Pain Score       04/16/21 2046       8           Vitals:    04/16/21 1843 04/16/21 2046 04/16/21 2145   BP: (!) 194/93 (!) 173/84 122/73   Pulse: (!) 109 (!) 108 88   Patient Position - Orthostatic VS: Standing Standing Lying         Visual Acuity      ED Medications  Medications   cephalexin (KEFLEX) capsule 500 mg (has no administration in time range)   lidocaine (XYLOCAINE) 2 % topical gel 1 application (1 application Urethral Given 4/16/21 2111)       Diagnostic Studies  Results Reviewed     Procedure Component Value Units Date/Time    Urine Microscopic [[id number]]  (Abnormal) Collected: 04/16/21 2134    Lab Status: Final result Specimen: Urine, Indwelling Sal Catheter Updated: 04/16/21 2159     RBC, UA 0-1 /hpf      WBC, UA 4-10 /hpf      Epithelial Cells Occasional /hpf      Bacteria, UA Innumerable /hpf     UA w Reflex to Microscopic w Reflex to Culture [[id number]]  (Abnormal) Collected: 04/16/21 2134    Lab Status: Final result Specimen: Urine, Indwelling Sal Catheter Updated: 04/16/21 2151     Color, UA Yellow     Clarity, UA Clear     Specific Gravity, UA 1 010     pH, UA 5 5     Leukocytes, UA Small     Nitrite, UA Positive     Protein, UA Negative mg/dl      Glucose, UA Negative mg/dl      Ketones, UA Negative mg/dl      Urobilinogen, UA 0 2 E U /dl      Bilirubin, UA Negative     Blood, UA Moderate                 No orders to display              Procedures  Procedures         ED Course  ED Course as of Apr 16 2203 Fri Apr 16, 2021 2124 On re-assessment, sal draining clear yellow urine, draining well  Patient reports he feels much better and all symptoms are resolved  Will send UA to r/o any UTI causing retention, though doubt this given patient reports no urinary symptoms   Should f/u with urology for instruction for removal        2154 Nitrite, UA(!): Positive                                           MDM  Number of Diagnoses or Management Options  Acute urinary retention:   Lower abdominal pain:   UTI (urinary tract infection):   Diagnosis management comments: On exam, well-appearing male, no acute distress, nontoxic appearance, afebrile, hypertensive and tachycardic, vitals otherwise stable, pacing around room unable to get comfortable, abdomen soft and tender in the lower abdominal quadrants without guarding or other peritoneal signs, no distention, no palpable mass, remainder of exam unremarkable as above  Vitals likely secondary to pain and discomfort from acute urinary retention  Will place Gupta catheter, if pain resolves and vitals improve and patient feels back at his baseline, will discharge and have patient follow-up with urology for removal   Given symptomatic course, history of acute urinary retention secondary to BPH, and generally benign abdominal exam, there is low suspicion for other abdominal pathology contributing to symptoms  CT will be deferred at this time  If patient continues to have symptoms after placement of Gupta catheter, will consider CT and blood work at this time  Amount and/or Complexity of Data Reviewed  Decide to obtain previous medical records or to obtain history from someone other than the patient: yes  Obtain history from someone other than the patient: yes  Review and summarize past medical records: yes  Discuss the patient with other providers: yes    Patient Progress  Patient progress: improved (See ED course note for dispo and plan  Keflex prescribed for nitrite positive sample  I reviewed and discussed UA findings with the patient at bedside  I discussed emergency department return parameters  I answered any and all questions the patient had regarding emergency department course of evaluation and treatment  The patient verbalized understanding of and agreement with plan  )      Disposition  Final diagnoses:   Lower abdominal pain   Acute urinary retention   UTI (urinary tract infection)     Time reflects when diagnosis was documented in both MDM as applicable and the Disposition within this note     Time User Action Codes Description Comment    4/16/2021  9:54 PM Dearl Vikas Add [R10 30] Lower abdominal pain     4/16/2021  9:55 PM Dearl Vikas Add [R33 8] Acute urinary retention     4/16/2021  9:55 PM Dearl Vikas Add [N39 0] UTI (urinary tract infection)       ED Disposition     ED Disposition Condition Date/Time Comment    Discharge Stable Fri Apr 16, 2021  9:54 PM Laurent Lorenz discharge to home/self care  Follow-up Information     Follow up With Specialties Details Why [address] [place] [address] Emergency Department Emergency Medicine Go to  If symptoms worsen [facility] [id number]  [facility] Emergency Department, [address] [place] [address],  [address] [place] [address] Urology [facility] Urology Call in 1 day For appointment for further evaluation [place] [address] [id number]  [address] [place] [address] For Urology [facility], [address] [place] [address], [id number] [phone]          Patient's Medications   Discharge Prescriptions    CEPHALEXIN (KEFLEX) 500 MG CAPSULE    Take 1 capsule (500 mg total) by mouth 2 (two) times a day for 7 days       Start Date: 4/16/2021 End Date: 4/23/2021       Order Dose: 500 mg       Quantity: 14 capsule    Refills: 0     No discharge procedures on file      PDMP Review     None          ED Provider  Electronically Signed by           Homer Hernandez PA-C  04/16/21 3247

## 2021-04-19 ENCOUNTER — TELEPHONE (OUTPATIENT)
Dept: UROLOGY | Facility: MEDICAL CENTER | Age: 76
End: 2021-04-19

## 2021-04-19 NOTE — TELEPHONE ENCOUNTER
Called and spoke with patient  Patient scheduled 04/27/21 at 9:30 am for sal removal and 2:30 pm for PVR

## 2021-04-19 NOTE — TELEPHONE ENCOUNTER
Patient of Dr Fatoumata Gilliam at [facility]  Was seen in ER 4/16 and cath inserted    Please call and advise at [phone]

## 2021-04-27 ENCOUNTER — PROCEDURE VISIT (OUTPATIENT)
Dept: UROLOGY | Facility: CLINIC | Age: 76
End: 2021-04-27
Payer: MEDICARE

## 2021-04-27 VITALS
WEIGHT: 170 LBS | SYSTOLIC BLOOD PRESSURE: 142 MMHG | BODY MASS INDEX: 25.18 KG/M2 | RESPIRATION RATE: 20 BRPM | HEART RATE: 72 BPM | DIASTOLIC BLOOD PRESSURE: 80 MMHG | HEIGHT: 69 IN

## 2021-04-27 DIAGNOSIS — R33.9 URINARY RETENTION: Primary | ICD-10-CM

## 2021-04-27 DIAGNOSIS — R33.9 URINARY RETENTION: ICD-10-CM

## 2021-04-27 PROCEDURE — 1123F ACP DISCUSS/DSCN MKR DOCD: CPT

## 2021-04-27 PROCEDURE — 99211 OFF/OP EST MAY X REQ PHY/QHP: CPT

## 2021-04-27 RX ORDER — TAMSULOSIN HYDROCHLORIDE 0.4 MG/1
0.4 CAPSULE ORAL
Qty: 30 CAPSULE | Refills: 11 | Status: SHIPPED | OUTPATIENT
Start: 2021-04-27 | End: 2021-11-23 | Stop reason: SDUPTHER

## 2021-04-27 NOTE — PROGRESS NOTES
4/27/2021    [facility]  1945  [id number]    Diagnosis  Chief Complaint     Urinary Retention; Void Trial          Patient presents for void trial managed by Dr Christiano Lord  Patient to return in one week for another void trial  Begin Tamsulosin this evening one daily  Procedure Gupta removal/voiding trial  When reviewing medication list with patient, it was discovered patient "ran out of the Tamsulosin and has not been taking it for about a week and half "  Discussed with patient he is at high risk of failing the void trial without the Flomax  Patient willing to restart the Flomax tonight and reschedule void trial for next week          Vitals:    04/27/21 0926   BP: 142/80   Pulse: 72   Resp: 20   Weight: 77 1 kg (170 lb)   Height: 5' 9" (1 753 m)           Cliff Orona RN

## 2021-05-03 DIAGNOSIS — I25.10 CORONARY ARTERY DISEASE INVOLVING NATIVE HEART: ICD-10-CM

## 2021-05-03 DIAGNOSIS — E78.5 DYSLIPIDEMIA: ICD-10-CM

## 2021-05-03 RX ORDER — EZETIMIBE 10 MG/1
TABLET ORAL
Qty: 30 TABLET | Refills: 14 | Status: SHIPPED | OUTPATIENT
Start: 2021-05-03 | End: 2021-12-07 | Stop reason: SDUPTHER

## 2021-05-04 ENCOUNTER — PROCEDURE VISIT (OUTPATIENT)
Dept: UROLOGY | Facility: CLINIC | Age: 76
End: 2021-05-04
Payer: MEDICARE

## 2021-05-04 VITALS
HEIGHT: 69 IN | RESPIRATION RATE: 20 BRPM | HEART RATE: 84 BPM | BODY MASS INDEX: 24.88 KG/M2 | SYSTOLIC BLOOD PRESSURE: 130 MMHG | DIASTOLIC BLOOD PRESSURE: 70 MMHG | WEIGHT: 168 LBS

## 2021-05-04 DIAGNOSIS — R33.9 URINARY RETENTION: Primary | ICD-10-CM

## 2021-05-04 LAB — POST-VOID RESIDUAL VOLUME, ML POC: 49 ML

## 2021-05-04 PROCEDURE — 51798 US URINE CAPACITY MEASURE: CPT

## 2021-05-04 NOTE — PROGRESS NOTES
5/4/2021    Shelbi June 1945  [id number]    Diagnosis  Chief Complaint     Urinary Retention; Void Trial          Patient presents for void trial managed by Dr Jason Grajeda  Patient to keep scheduled appointment for 05/21/2021 with Sonido William at the [facility] office  Procedure Gupta removal/voiding trial    Gupta catheter removed after deflation of an intact balloon  Patient tolerated well  Encouraged patient to hydrate well and return this afternoon for post void residual   he knows he may return early if uncomfortable and unable to urinate  Patient agrees to this plan  Patient returned this afternoon  Patient states able to void  Patient voided 0 ml while in office  Bladder ultrasound performed and PVR measured 49 ml      Recent Results (from the past 1 hour(s))   POCT Measure PVR    Collection Time: 05/04/21  2:01 PM   Result Value Ref Range    POST-VOID RESIDUAL VOLUME, ML POC 49 mL         Vitals:    05/04/21 0851   BP: 130/70   Pulse: 84   Resp: 20   Weight: 76 2 kg (168 lb)   Height: 5' 9" (1 753 m)           Governor LEVON Aponte

## 2021-05-21 ENCOUNTER — OFFICE VISIT (OUTPATIENT)
Dept: UROLOGY | Facility: CLINIC | Age: 76
End: 2021-05-21
Payer: MEDICARE

## 2021-05-21 VITALS
BODY MASS INDEX: 25.18 KG/M2 | WEIGHT: 170 LBS | DIASTOLIC BLOOD PRESSURE: 80 MMHG | SYSTOLIC BLOOD PRESSURE: 140 MMHG | HEIGHT: 69 IN

## 2021-05-21 DIAGNOSIS — N40.1 BENIGN PROSTATIC HYPERPLASIA WITH URINARY OBSTRUCTION: ICD-10-CM

## 2021-05-21 DIAGNOSIS — N13.8 BENIGN PROSTATIC HYPERPLASIA WITH URINARY OBSTRUCTION: ICD-10-CM

## 2021-05-21 DIAGNOSIS — R33.9 URINARY RETENTION: Primary | ICD-10-CM

## 2021-05-21 LAB — POST-VOID RESIDUAL VOLUME, ML POC: 0 ML

## 2021-05-21 PROCEDURE — 99214 OFFICE O/P EST MOD 30 MIN: CPT | Performed by: PHYSICIAN ASSISTANT

## 2021-05-21 PROCEDURE — 51798 US URINE CAPACITY MEASURE: CPT | Performed by: PHYSICIAN ASSISTANT

## 2021-05-21 RX ORDER — FINASTERIDE 5 MG/1
5 TABLET, FILM COATED ORAL DAILY
Qty: 90 TABLET | Refills: 3 | Status: SHIPPED | OUTPATIENT
Start: 2021-05-21 | End: 2022-07-21

## 2021-05-21 NOTE — PATIENT INSTRUCTIONS
Transurethral Prostatectomy   WHAT YOU NEED TO KNOW:   What do I need to know about a transurethral prostatectomy? A transurethral prostatectomy is surgery to remove part or all of your prostate gland  This surgery is also called transurethral resection of the prostate (TURP)  How do I prepare for a TURP? · Your surgeon will tell you how to prepare  You may be told not to eat or drink anything after midnight on the day of surgery  Arrange to have someone drive you home after surgery  · Tell your surgeon about all the medicines you currently take  He or she will tell you if you need to stop any medicine before surgery, and when to stop  He or she will tell you which medicines to take or not take on the day of surgery  · Tell your surgeon if you have heart disease or blood clotting problems  · You may be given medicine to shrink the size of your prostate  You may also be given antibiotics to help prevent or treat a bacterial infection  Tell your surgeon if you had an allergic reaction to antibiotics, anesthesia, or other medicine  · You may need blood and urine tests  You may also need a rectal exam to check the size of your prostate  What will happen during a TURP? · You may be given anesthesia to make you lose feeling from the waist down, or to keep you asleep during surgery  Your surgeon will insert a resectoscope through your urethra  A resectoscope is a tube with a small monitor on the end  The monitor shows your prostate on a screen during surgery  Your bladder may be filled with fluid during surgery  · Heat that is produced by the resectoscope will be used to remove part, or all, of your prostate  Heat will also be used to stop bleeding in the surgery area  Fluid is used to wash away extra tissue and blood  The resectoscope will be removed from your urethra  A catheter (soft tube) will be put through your urethra and into your bladder   The catheter will be left in place to drain urine out of your body and into a bag  What should I expect after a TURP? A Gupta catheter is a thin tube inserted through your urethra and moved into your bladder  The catheter is used to drain urine into a bag  Healthcare providers will remove the catheter when you no longer need it  Antibiotics may be given to prevent a bacterial infection  What are the risks of a TURP? · Your prostate, bladder, or urethra may be damaged  Your urethra or part of your bladder may grow narrow  This can make it difficult or painful to urinate  You may feel like you need to urinate often, or have trouble controlling when you urinate  You may get blood clots in your urine that can block your urethra  · You may develop a urinary tract infection, or an infection in the surgery area  You may have trouble getting an erection or ejaculating  You may develop TURP syndrome, which can cause dizziness, fatigue, stomach pain, and vomiting  If you had a partial resection of the prostate, the part of your prostate that was not removed may grow too large  This can cause your signs and symptoms to return, and you may need to have surgery again  CARE AGREEMENT:   You have the right to help plan your care  Learn about your health condition and how it may be treated  Discuss treatment options with your healthcare providers to decide what care you want to receive  You always have the right to refuse treatment  The above information is an  only  It is not intended as medical advice for individual conditions or treatments  Talk to your doctor, nurse or pharmacist before following any medical regimen to see if it is safe and effective for you  © Copyright [address] Information is for End User's use only and may not be sold, redistributed or otherwise used for commercial purposes   All illustrations and images included in CareNotes® are the copyrighted property of [employer]  or [facility] [employer]

## 2021-05-21 NOTE — PROGRESS NOTES
5/21/2021      Chief Complaint   Patient presents with    Urinary Retention         Assessment and Plan    77 y o  male    1  BPH with urinary retention  - retention episode precipitated by ran out of his medications  - back on flomax/proscar, compliant with same and doing much better  - pvr 0ml today    2  Prostate cancer screening  - LEO smooth enlarged no nodule  Lab Results   Component Value Date    PSA 3 5 01/24/2018     3  Epididymorchitis  - resolved    Discussed TURP should his symptoms return or any future retention episodes  For now he wishes to continue medications  Final PSA in a few weeks  No recurrence of scrotal symptoms  History of Present Illness  Milagros Elias is a 77 y o  male here for evaluation of Three-week follow-up urinary retention  Developed urinary retention last summer for the third time, and epididymo-orchitis following that  He resumed his Flomax and began voiding better  He underwent cystoscopy with TRUS measurement on August 21, 2020  Showing bilateral lateral lobe hyperplasia and significant intravesical portion of the prostate  No bladder lesions  LEO 60-80 g prostate without nodule  Finasteride was added at that time  He was recommended for TURP should he develop recurrent retention episodes thereafter  He developed urinary retention again three weeks ago after ran out of his meds for about two weeks- a catheter was placed in the emergency department, he had void trial on May 4th, he is voiding well again since  His PVR today is 0  He is back on Flomax and Proscar, tolerating medications well with no voiding complaint at this time  He is on a baby aspirin but no additional blood thinning medications  Review of Systems   Constitutional: Negative for activity change, appetite change, chills, fever and unexpected weight change  HENT: Negative  Respiratory: Negative  Negative for shortness of breath  Cardiovascular: Negative    Negative for chest pain    Gastrointestinal: Negative for abdominal pain, diarrhea, nausea and vomiting  Endocrine: Negative  Genitourinary: Negative for decreased urine volume, difficulty urinating, dysuria, flank pain, frequency, hematuria and urgency  Musculoskeletal: Negative for back pain and gait problem  Skin: Negative  Allergic/Immunologic: Negative  Neurological: Negative  Hematological: Negative for adenopathy  Does not bruise/bleed easily  AUA SYMPTOM SCORE      Most Recent Value   AUA SYMPTOM SCORE   How often have you had a sensation of not emptying your bladder completely after you finished urinating? 1   How often have you had to urinate again less than two hours after you finished urinating? 1   How often have you found you stopped and started again several times when you urinate? 1   How often have you found it difficult to postpone urination? 0   How often have you had a weak urinary stream?  1   How often have you had to push or strain to begin urination? 0   How many times did you most typically get up to urinate from the time you went to bed at night until the time you got up in the morning?  0   Quality of Life: If you were to spend the rest of your life with your urinary condition just the way it is now, how would you feel about that?  2   AUA SYMPTOM SCORE  4           Vitals  Vitals:    05/21/21 0848   BP: 140/80   Weight: 77 1 kg (170 lb)   Height: 5' 9" (1 753 m)       Physical Exam  Vitals signs and nursing note reviewed  Constitutional:       General: He is not in acute distress  Appearance: Normal appearance  He is well-developed  He is not diaphoretic  HENT:      Head: Normocephalic and atraumatic  Pulmonary:      Effort: Pulmonary effort is normal       Comments: No cough or audible wheeze  Abdominal:      General: There is no distension  Tenderness: There is no abdominal tenderness  There is no right CVA tenderness or left CVA tenderness  Genitourinary:     Comments: Circumcised penis, normal phallus, orthotopic patent meatus  Testes smooth descended bilaterally into the scrotum nontender with no palpable mass  Digital rectal exam smooth prostate, without appreciable nodule, induration or asymmetry  Musculoskeletal:      Right lower leg: No edema  Left lower leg: No edema  Skin:     General: Skin is warm and dry  Neurological:      Mental Status: He is alert and oriented to person, place, and time  Gait: Gait normal    Psychiatric:         Speech: Speech normal          Behavior: Behavior normal            Past History  Past Medical History:   Diagnosis Date    Coronary artery disease     Full dentures     upper and lower    GERD (gastroesophageal reflux disease)     Hyperlipidemia     Hypertension     Inguinal hernia 02/2018    right side    Myocardial infarction ([facility] [address]     Pneumonia     Sciatica     Urinary retention 2/13/2019    Wears glasses      Social History     Socioeconomic History    Marital status: /Civil Union     Spouse name: None    Number of children: None    Years of education: None    Highest education level: None   Occupational History    None   Social Needs    Financial resource strain: None    Food insecurity     Worry: None     Inability: None    Transportation needs     Medical: None     Non-medical: None   Tobacco Use    Smoking status: Current Every Day Smoker     Packs/day: 0 50     Years: 40 00     Pack years: 20 00     Types: Cigarettes    Smokeless tobacco: Never Used   Substance and Sexual Activity    Alcohol use:  Yes     Alcohol/week: 10 0 standard drinks     Types: 10 Cans of beer per week     Comment: social     Drug use: No    Sexual activity: None   Lifestyle    Physical activity     Days per week: None     Minutes per session: None    Stress: None   Relationships    Social connections     Talks on phone: None     Gets together: None     Attends [employer] service: None     Active member of club or organization: None     Attends meetings of clubs or organizations: None     Relationship status: None    Intimate partner violence     Fear of current or ex partner: None     Emotionally abused: None     Physically abused: None     Forced sexual activity: None   Other Topics Concern    None   Social History Narrative    None     Social History     Tobacco Use   Smoking Status Current Every Day Smoker    Packs/day: 0 50    Years: 40 00    Pack years: 20 00    Types: Cigarettes   Smokeless Tobacco Never Used     Family History   Problem Relation Age of Onset    Gallbladder disease Mother        The following portions of the patient's history were reviewed and updated as appropriate: allergies, current medications, past medical history, past social history, past surgical history and problem list     Results  No results found for this or any previous visit (from the past 1 hour(s)) ]  Lab Results   Component Value Date    PSA 3 5 01/24/2018     Lab Results   Component Value Date    CALCIUM 8 8 02/09/2019    K 4 1 02/09/2019    CO2 24 02/09/2019     02/09/2019    BUN 11 02/09/2019    CREATININE 0 79 02/09/2019     Lab Results   Component Value Date    WBC 13 35 (H) 01/24/2018    HGB 17 9 (H) 01/24/2018    HCT 51 8 (H) 01/24/2018    MCV 99 (H) 01/24/2018     01/24/2018

## 2021-09-06 DIAGNOSIS — I25.10 CORONARY ARTERY DISEASE INVOLVING NATIVE CORONARY ARTERY OF NATIVE HEART WITHOUT ANGINA PECTORIS: ICD-10-CM

## 2021-09-06 DIAGNOSIS — E78.5 DYSLIPIDEMIA: ICD-10-CM

## 2021-09-08 RX ORDER — METOPROLOL SUCCINATE 100 MG/1
TABLET, EXTENDED RELEASE ORAL
Qty: 30 TABLET | Refills: 11 | Status: SHIPPED | OUTPATIENT
Start: 2021-09-08 | End: 2021-12-07 | Stop reason: SDUPTHER

## 2021-09-08 RX ORDER — ATORVASTATIN CALCIUM 80 MG/1
TABLET, FILM COATED ORAL
Qty: 30 TABLET | Refills: 11 | Status: SHIPPED | OUTPATIENT
Start: 2021-09-08 | End: 2021-12-07 | Stop reason: SDUPTHER

## 2021-11-23 ENCOUNTER — OFFICE VISIT (OUTPATIENT)
Dept: UROLOGY | Facility: CLINIC | Age: 76
End: 2021-11-23
Payer: MEDICARE

## 2021-11-23 VITALS
DIASTOLIC BLOOD PRESSURE: 62 MMHG | HEIGHT: 69 IN | WEIGHT: 165 LBS | BODY MASS INDEX: 24.44 KG/M2 | SYSTOLIC BLOOD PRESSURE: 122 MMHG

## 2021-11-23 DIAGNOSIS — R33.9 URINARY RETENTION: Primary | ICD-10-CM

## 2021-11-23 LAB — POST-VOID RESIDUAL VOLUME, ML POC: 35 ML

## 2021-11-23 PROCEDURE — 99214 OFFICE O/P EST MOD 30 MIN: CPT | Performed by: PHYSICIAN ASSISTANT

## 2021-11-23 PROCEDURE — 51798 US URINE CAPACITY MEASURE: CPT | Performed by: PHYSICIAN ASSISTANT

## 2021-11-23 RX ORDER — TAMSULOSIN HYDROCHLORIDE 0.4 MG/1
0.4 CAPSULE ORAL
Qty: 90 CAPSULE | Refills: 3 | Status: SHIPPED | OUTPATIENT
Start: 2021-11-23 | End: 2022-03-30

## 2021-12-07 DIAGNOSIS — I25.10 CORONARY ARTERY DISEASE INVOLVING NATIVE HEART: ICD-10-CM

## 2021-12-07 DIAGNOSIS — E78.5 DYSLIPIDEMIA: ICD-10-CM

## 2021-12-07 DIAGNOSIS — I25.10 CORONARY ARTERY DISEASE INVOLVING NATIVE CORONARY ARTERY OF NATIVE HEART WITHOUT ANGINA PECTORIS: ICD-10-CM

## 2021-12-07 RX ORDER — ATORVASTATIN CALCIUM 80 MG/1
80 TABLET, FILM COATED ORAL DAILY
Qty: 30 TABLET | Refills: 1 | Status: SHIPPED | OUTPATIENT
Start: 2021-12-07 | End: 2022-02-16

## 2021-12-07 RX ORDER — EZETIMIBE 10 MG/1
10 TABLET ORAL DAILY
Qty: 30 TABLET | Refills: 1 | Status: SHIPPED | OUTPATIENT
Start: 2021-12-07 | End: 2022-02-16

## 2021-12-07 RX ORDER — METOPROLOL SUCCINATE 100 MG/1
100 TABLET, EXTENDED RELEASE ORAL DAILY
Qty: 30 TABLET | Refills: 1 | Status: SHIPPED | OUTPATIENT
Start: 2021-12-07 | End: 2022-02-16

## 2022-01-07 DIAGNOSIS — I10 HTN (HYPERTENSION), BENIGN: ICD-10-CM

## 2022-01-07 RX ORDER — LOSARTAN POTASSIUM 50 MG/1
TABLET ORAL
Qty: 15 TABLET | Refills: 4 | OUTPATIENT
Start: 2022-01-07

## 2022-01-07 NOTE — TELEPHONE ENCOUNTER
Rejecting losartan Rx as pt not seen since 2019  Please request pharmacy to get refill from PCP    Thx    RP

## 2022-01-07 NOTE — TELEPHONE ENCOUNTER
Phone call to [facility]   Pharmacy received denial and notified patient he needs appt      Epic shows patient has appt 1/10/22

## 2022-01-10 ENCOUNTER — OFFICE VISIT (OUTPATIENT)
Dept: CARDIOLOGY CLINIC | Facility: CLINIC | Age: 77
End: 2022-01-10
Payer: MEDICARE

## 2022-01-10 VITALS
HEART RATE: 63 BPM | WEIGHT: 165.8 LBS | RESPIRATION RATE: 16 BRPM | DIASTOLIC BLOOD PRESSURE: 82 MMHG | HEIGHT: 69 IN | SYSTOLIC BLOOD PRESSURE: 146 MMHG | BODY MASS INDEX: 24.56 KG/M2

## 2022-01-10 DIAGNOSIS — I25.10 CORONARY ARTERY DISEASE INVOLVING NATIVE CORONARY ARTERY OF NATIVE HEART WITHOUT ANGINA PECTORIS: Primary | ICD-10-CM

## 2022-01-10 DIAGNOSIS — E78.5 DYSLIPIDEMIA: ICD-10-CM

## 2022-01-10 DIAGNOSIS — I73.9 PERIPHERAL ARTERIAL DISEASE (HCC): ICD-10-CM

## 2022-01-10 DIAGNOSIS — I10 HTN (HYPERTENSION), BENIGN: ICD-10-CM

## 2022-01-10 PROCEDURE — 99214 OFFICE O/P EST MOD 30 MIN: CPT | Performed by: INTERNAL MEDICINE

## 2022-01-10 PROCEDURE — 93000 ELECTROCARDIOGRAM COMPLETE: CPT | Performed by: INTERNAL MEDICINE

## 2022-01-10 RX ORDER — LOSARTAN POTASSIUM 50 MG/1
50 TABLET ORAL DAILY
Qty: 90 TABLET | Refills: 5 | Status: SHIPPED | OUTPATIENT
Start: 2022-01-10

## 2022-01-10 NOTE — PROGRESS NOTES
Cardiology             [facility]  1945  [id number]              Discussion/Summary:     1  CAD, prior PCI/ARIK to the left circumflex artery in 2013, setting of NSTEMI  2  Dyslipidemia  3  Hypertension  4  Peripheral arterial disease with greater than 70% right SFA stenosis on prior Dopplers in 2015, without claudication  5  Moderate aortic insufficiency  6  Abdominal aortic ectasia       · No symptoms of angina  Echocardiogram 09/06/2019 with evidence of probable basal inferior hypokinesis with prior ECG revealing the same  This possibly may be due to left circumflex in stent restenosis  Fortunately, he does not have symptoms, therefore I will recommend aggressive medical management  I have once again advised him to stop smoking  · Blood pressure suboptimal   He has not been taking his losartan as he ran out recently about 1 week ago  Will refill losartan 50 mg daily  He states he was only taking sec 25 mg daily at home  Will recheck BMP and lipid panel before next visit in 2 months  · Prior carotid Dopplers 10/2018 with less than 50% bilateral stenosis  Will repeat after next visit  · Will recheck abdominal aortic ultrasound after 2 years, as prior study in 2018 had slight ectasia of the proximal and distal abdominal aorta       Follow-up in 3 months        Interval History:      This is a very pleasant 79-year-old male with a history of CAD status post PCI/ARIK to the mid circumflex in 2013, setting of NSTEMI, as well as a history of hypertension and dyslipidemia   He followed at the [address] [place] [address] up until 01/2018 when he transition to our practice  [facility] nuclear stress test 06/2013 after left circumflex stenting reported mid to distal inferolateral scar with mild heath-infarct ischemia   Ejection fraction has been within normal limits by prior echocardiogram 06/2018 without regional wall motion abnormality and moderate aortic insufficiency    Prior lower extremity arterial Dopplers in 2015 revealed greater than 75% stenosis of the right distal SFA  He underwent an echocardiogram 09/06/2019 which revealed ejection fraction 55% with possible basal inferior hypokinesis      He presents today for follow-up with no complaints  He denies exertional chest pain, shortness of breath, dizziness, palpitations, lower extremity edema  He still smokes about 6-8 cigarettes daily although on some days he states he does not smoke call  Vitals:  Vitals:    01/10/22 0921   BP: 146/82   Pulse: 63   Resp: 16   Weight: 75 2 kg (165 lb 12 8 oz)   Height: 5' 9" (1 753 m)         Past Medical History:   Diagnosis Date    Coronary artery disease     Full dentures     upper and lower    GERD (gastroesophageal reflux disease)     Hyperlipidemia     Hypertension     Inguinal hernia 02/2018    right side    [facility] infarction ([facility] [address]     Pneumonia     Sciatica     Urinary retention 2/13/2019    Wears glasses      Social History     Socioeconomic History    Marital status: /Civil Union     Spouse name: Not on file    Number of children: Not on file    Years of education: Not on file    Highest education level: Not on file   Occupational History    Not on file   Tobacco Use    Smoking status: Current Every Day Smoker     Packs/day: 0 50     Years: 40 00     Pack years: 20 00     Types: Cigarettes    Smokeless tobacco: Never Used   Vaping Use    Vaping Use: Never used   Substance and Sexual Activity    Alcohol use:  Yes     Alcohol/week: 10 0 standard drinks     Types: 10 Cans of beer per week     Comment: social     Drug use: No    Sexual activity: Not on file   Other Topics Concern    Not on file   Social History Narrative    Not on file     Social Determinants of Health     Financial Resource Strain: Not on file   Food Insecurity: Not on file   Transportation Needs: Not on file   Physical Activity: Not on file   Stress: Not on file   Social Connections: Not on file Intimate Partner Violence: Not on file   Housing Stability: Not on file      Family History   Problem Relation Age of Onset    Gallbladder disease Mother      Past Surgical History:   Procedure Laterality Date    APPENDECTOMY      CORONARY ANGIOPLASTY WITH STENT PLACEMENT  05/2013    x1    CYSTOSCOPY  08/21/2020    HERNIA REPAIR Left     inguinal hernia     LAPAROSCOPIC INGUINAL HERNIA REPAIR Right 02/08/2018    ROBOTIC REPAIR WITH MESH-MANASA TAPIA MD    TONSILLECTOMY      WISDOM TOOTH EXTRACTION         Current Outpatient Medications:     aspirin 81 mg chewable tablet, Chew 81 mg daily, Disp: , Rfl:     atorvastatin (LIPITOR) 80 mg tablet, Take 1 tablet (80 mg total) by mouth daily, Disp: 30 tablet, Rfl: 1    buPROPion (WELLBUTRIN SR) 150 mg 12 hr tablet, Take 1 tablet (150 mg total) by mouth 2 (two) times a day, Disp: 60 tablet, Rfl: 2    ezetimibe (ZETIA) 10 mg tablet, Take 1 tablet (10 mg total) by mouth daily, Disp: 30 tablet, Rfl: 1    finasteride (PROSCAR) 5 mg tablet, Take 1 tablet (5 mg total) by mouth daily, Disp: 90 tablet, Rfl: 3    losartan (COZAAR) 50 mg tablet, Take 1 tablet (50 mg total) by mouth daily, Disp: 90 tablet, Rfl: 5    metoprolol succinate (TOPROL-XL) 100 mg 24 hr tablet, Take 1 tablet (100 mg total) by mouth daily, Disp: 30 tablet, Rfl: 1    tamsulosin (FLOMAX) 0 4 mg, Take 1 capsule (0 4 mg total) by mouth daily with dinner, Disp: 90 capsule, Rfl: 3        Review of Systems:  Review of Systems   Respiratory: Negative  Cardiovascular: Negative  All other systems reviewed and are negative  Physical Exam:  Physical Exam  Constitutional:       General: He is not in acute distress  Appearance: He is well-developed  He is not diaphoretic  HENT:      Head: Normocephalic and atraumatic  Eyes:      General: No scleral icterus  Right eye: No discharge  Pupils: Pupils are equal, round, and reactive to light  Neck:      Thyroid: No thyromegaly  Cardiovascular:      Rate and Rhythm: Normal rate and regular rhythm  Heart sounds: Normal heart sounds  No murmur heard  No friction rub  No gallop  Pulmonary:      Effort: Pulmonary effort is normal       Breath sounds: Normal breath sounds  Abdominal:      General: There is no distension  Tenderness: There is no abdominal tenderness  There is no guarding or rebound  Musculoskeletal:         General: Normal range of motion  Cervical back: Normal range of motion and neck supple  Skin:     General: Skin is warm and dry  Coloration: Skin is not pale  Findings: No erythema or rash  Neurological:      Mental Status: He is alert and oriented to person, place, and time  Coordination: Coordination normal    Psychiatric:         Behavior: Behavior normal          Thought Content: Thought content normal          Judgment: Judgment normal          This note was completed in part utilizing [employer] Direct Software  Grammatical errors, random word insertions, spelling mistakes, and incomplete sentences can be an occasional consequence of this system secondary to software limitations, ambient noise, and hardware issues  If you have any questions or concerns about the content, text, or information contained within the body of this dictation, please contact the provider for clarification

## 2022-02-16 DIAGNOSIS — E78.5 DYSLIPIDEMIA: ICD-10-CM

## 2022-02-16 DIAGNOSIS — I25.10 CORONARY ARTERY DISEASE INVOLVING NATIVE HEART: ICD-10-CM

## 2022-02-16 DIAGNOSIS — I25.10 CORONARY ARTERY DISEASE INVOLVING NATIVE CORONARY ARTERY OF NATIVE HEART WITHOUT ANGINA PECTORIS: ICD-10-CM

## 2022-02-16 RX ORDER — ATORVASTATIN CALCIUM 80 MG/1
TABLET, FILM COATED ORAL
Qty: 30 TABLET | Refills: 1 | Status: SHIPPED | OUTPATIENT
Start: 2022-02-16 | End: 2022-04-21

## 2022-02-16 RX ORDER — EZETIMIBE 10 MG/1
TABLET ORAL
Qty: 30 TABLET | Refills: 1 | Status: SHIPPED | OUTPATIENT
Start: 2022-02-16 | End: 2022-04-21

## 2022-02-16 RX ORDER — METOPROLOL SUCCINATE 100 MG/1
TABLET, EXTENDED RELEASE ORAL
Qty: 30 TABLET | Refills: 1 | Status: SHIPPED | OUTPATIENT
Start: 2022-02-16 | End: 2022-04-19

## 2022-03-30 ENCOUNTER — OFFICE VISIT (OUTPATIENT)
Dept: CARDIOLOGY CLINIC | Facility: CLINIC | Age: 77
End: 2022-03-30
Payer: MEDICARE

## 2022-03-30 VITALS
DIASTOLIC BLOOD PRESSURE: 60 MMHG | HEIGHT: 69 IN | WEIGHT: 168.8 LBS | OXYGEN SATURATION: 98 % | BODY MASS INDEX: 25 KG/M2 | HEART RATE: 69 BPM | SYSTOLIC BLOOD PRESSURE: 130 MMHG

## 2022-03-30 DIAGNOSIS — E78.5 DYSLIPIDEMIA: ICD-10-CM

## 2022-03-30 DIAGNOSIS — I25.10 CORONARY ARTERY DISEASE INVOLVING NATIVE CORONARY ARTERY OF NATIVE HEART WITHOUT ANGINA PECTORIS: Primary | ICD-10-CM

## 2022-03-30 DIAGNOSIS — I10 BENIGN ESSENTIAL HTN: ICD-10-CM

## 2022-03-30 PROCEDURE — 99214 OFFICE O/P EST MOD 30 MIN: CPT | Performed by: INTERNAL MEDICINE

## 2022-03-30 NOTE — PROGRESS NOTES
Cardiology             Camila Miller  1945  [id number]              Discussion/Summary:     1  CAD, prior PCI/ARIK to the left circumflex artery in 2013, setting of NSTEMI  2  Dyslipidemia  3  Hypertension  4  Peripheral arterial disease with greater than 70% right SFA stenosis on prior Dopplers in 2015, without claudication  5  Moderate aortic insufficiency  6  Abdominal aortic ectasia          · Patient without symptoms of angina  Will continue low-dose aspirin, high-dose atorvastatin, ezetimibe  · Blood pressure controlled, continue losartan, metoprolol  Explained to him that ideally systolic blood pressure should be less than 130 mm Hg, and have encouraged him to monitor blood pressures at home and call me if they become uncontrolled  · Encouraged tobacco cessation once more  Explained potential cardiovascular risks of continued tobacco use  · Prior carotid Dopplers 10/2018 with less than 50% bilateral stenosis  Will repeat after next visit  · Will recheck abdominal aortic ultrasound after 2 years, as prior study in 2018 had slight ectasia of the proximal and distal abdominal aorta          Follow-up in  6 months  Will repeat carotid Dopplers after next visit          Interval History:      This is a very pleasant 50-year-old male with a history of CAD status post PCI/ARIK to the mid circumflex in 2013, setting of NSTEMI, as well as a history of hypertension and dyslipidemia   He followed at the [address] [place] [address] up until 01/2018 when he transition to our practice  [place] nuclear stress test 06/2013 after left circumflex stenting reported mid to distal inferolateral scar with mild heath-infarct ischemia   Ejection fraction has been within normal limits by prior echocardiogram 06/2018 without regional wall motion abnormality and moderate aortic insufficiency    Prior lower extremity arterial Dopplers in 2015 revealed greater than 75% stenosis of the right distal SFA      He underwent an echocardiogram 09/06/2019 which revealed ejection fraction 55% with possible basal inferior hypokinesis  He presents today for follow-up with no complaints  Specifically, he denies any checks pain, shortness of breath, dizziness, palpitations, lower extremity edema  He states he has cut down on smoking, now smoking about 5 cigarettes a day  Vitals:  Vitals:    03/30/22 0906   BP: 130/60   Pulse: 69   SpO2: 98%   Weight: 76 6 kg (168 lb 12 8 oz)   Height: 5' 9" (1 753 m)         Past Medical History:   Diagnosis Date    Coronary artery disease     Full dentures     upper and lower    GERD (gastroesophageal reflux disease)     Hyperlipidemia     Hypertension     Inguinal hernia 02/2018    right side    Myocardial infarction ([facility] [address]     Pneumonia     Sciatica     Urinary retention 2/13/2019    Wears glasses      Social History     Socioeconomic History    Marital status: /Civil Union     Spouse name: Not on file    Number of children: Not on file    Years of education: Not on file    Highest education level: Not on file   Occupational History    Not on file   Tobacco Use    Smoking status: Current Every Day Smoker     Packs/day: 0 50     Years: 40 00     Pack years: 20 00     Types: Cigarettes    Smokeless tobacco: Never Used   Vaping Use    Vaping Use: Never used   Substance and Sexual Activity    Alcohol use:  Yes     Alcohol/week: 10 0 standard drinks     Types: 10 Cans of beer per week     Comment: social     Drug use: No    Sexual activity: Not on file   Other Topics Concern    Not on file   Social History Narrative    Not on file     Social Determinants of Health     Financial Resource Strain: Not on file   Food Insecurity: Not on file   Transportation Needs: Not on file   Physical Activity: Not on file   Stress: Not on file   Social Connections: Not on file   Intimate Partner Violence: Not on file   Housing Stability: Not on file      Family History   Problem Relation Age of Onset    Gallbladder disease Mother      Past Surgical History:   Procedure Laterality Date    APPENDECTOMY      CORONARY ANGIOPLASTY WITH STENT PLACEMENT  05/2013    x1    CYSTOSCOPY  08/21/2020    HERNIA REPAIR Left     inguinal hernia     LAPAROSCOPIC INGUINAL HERNIA REPAIR Right 02/08/2018    ROBOTIC REPAIR WITH MESH-MANASA TAPIA MD    TONSILLECTOMY      WISDOM TOOTH EXTRACTION         Current Outpatient Medications:     aspirin 81 mg chewable tablet, Chew 81 mg daily, Disp: , Rfl:     atorvastatin (LIPITOR) 80 mg tablet, TAKE 1 TABLET BY MOUTH EVERY DAY, Disp: 30 tablet, Rfl: 1    ezetimibe (ZETIA) 10 mg tablet, TAKE 1 TABLET BY MOUTH EVERY DAY, Disp: 30 tablet, Rfl: 1    finasteride (PROSCAR) 5 mg tablet, Take 1 tablet (5 mg total) by mouth daily, Disp: 90 tablet, Rfl: 3    losartan (COZAAR) 50 mg tablet, Take 1 tablet (50 mg total) by mouth daily, Disp: 90 tablet, Rfl: 5    metoprolol succinate (TOPROL-XL) 100 mg 24 hr tablet, TAKE 1 TABLET BY MOUTH EVERY DAY, Disp: 30 tablet, Rfl: 1    tamsulosin (FLOMAX) 0 4 mg, Take 1 capsule (0 4 mg total) by mouth daily with dinner, Disp: 90 capsule, Rfl: 3    buPROPion (WELLBUTRIN SR) 150 mg 12 hr tablet, Take 1 tablet (150 mg total) by mouth 2 (two) times a day, Disp: 60 tablet, Rfl: 2        Review of Systems:  Review of Systems   Respiratory: Negative  Cardiovascular: Negative  All other systems reviewed and are negative  Physical Exam:  Physical Exam  Constitutional:       General: He is not in acute distress  Appearance: He is well-developed  He is not diaphoretic  HENT:      Head: Normocephalic and atraumatic  Eyes:      General: No scleral icterus  Right eye: No discharge  Pupils: Pupils are equal, round, and reactive to light  Neck:      Thyroid: No thyromegaly  Cardiovascular:      Rate and Rhythm: Normal rate and regular rhythm  Heart sounds: Normal heart sounds  No murmur heard    No friction rub  No gallop  Pulmonary:      Effort: Pulmonary effort is normal       Breath sounds: Normal breath sounds  Abdominal:      General: There is no distension  Tenderness: There is no abdominal tenderness  There is no guarding or rebound  Musculoskeletal:         General: Normal range of motion  Cervical back: Normal range of motion and neck supple  Skin:     General: Skin is warm and dry  Coloration: Skin is not pale  Findings: No erythema or rash  Neurological:      Mental Status: He is alert and oriented to person, place, and time  Coordination: Coordination normal    Psychiatric:         Behavior: Behavior normal          Thought Content: Thought content normal          Judgment: Judgment normal          This note was completed in part utilizing [employer] Direct Software  Grammatical errors, random word insertions, spelling mistakes, and incomplete sentences can be an occasional consequence of this system secondary to software limitations, ambient noise, and hardware issues  If you have any questions or concerns about the content, text, or information contained within the body of this dictation, please contact the provider for clarification

## 2022-04-19 DIAGNOSIS — I25.10 CORONARY ARTERY DISEASE INVOLVING NATIVE CORONARY ARTERY OF NATIVE HEART WITHOUT ANGINA PECTORIS: ICD-10-CM

## 2022-04-19 DIAGNOSIS — E78.5 DYSLIPIDEMIA: ICD-10-CM

## 2022-04-19 DIAGNOSIS — I25.10 CORONARY ARTERY DISEASE INVOLVING NATIVE HEART: ICD-10-CM

## 2022-04-19 RX ORDER — METOPROLOL SUCCINATE 100 MG/1
TABLET, EXTENDED RELEASE ORAL
Qty: 30 TABLET | Refills: 1 | Status: SHIPPED | OUTPATIENT
Start: 2022-04-19 | End: 2022-06-21

## 2022-04-19 NOTE — TELEPHONE ENCOUNTER
Requested medication(s) are due for refill today: Yes  Patient has already received a courtesy refill: No  Other reason request has been forwarded to provider: failed protocol

## 2022-04-21 RX ORDER — ATORVASTATIN CALCIUM 80 MG/1
TABLET, FILM COATED ORAL
Qty: 30 TABLET | Refills: 1 | Status: SHIPPED | OUTPATIENT
Start: 2022-04-21 | End: 2022-06-21

## 2022-04-21 RX ORDER — EZETIMIBE 10 MG/1
TABLET ORAL
Qty: 30 TABLET | Refills: 1 | Status: SHIPPED | OUTPATIENT
Start: 2022-04-21 | End: 2022-06-21

## 2022-06-21 DIAGNOSIS — E78.5 DYSLIPIDEMIA: ICD-10-CM

## 2022-06-21 DIAGNOSIS — I25.10 CORONARY ARTERY DISEASE INVOLVING NATIVE CORONARY ARTERY OF NATIVE HEART WITHOUT ANGINA PECTORIS: ICD-10-CM

## 2022-06-21 DIAGNOSIS — I25.10 CORONARY ARTERY DISEASE INVOLVING NATIVE HEART: ICD-10-CM

## 2022-06-21 RX ORDER — EZETIMIBE 10 MG/1
TABLET ORAL
Qty: 30 TABLET | Refills: 5 | Status: SHIPPED | OUTPATIENT
Start: 2022-06-21 | End: 2022-07-18 | Stop reason: SDUPTHER

## 2022-06-21 RX ORDER — METOPROLOL SUCCINATE 100 MG/1
TABLET, EXTENDED RELEASE ORAL
Qty: 30 TABLET | Refills: 5 | Status: SHIPPED | OUTPATIENT
Start: 2022-06-21 | End: 2022-07-18 | Stop reason: SDUPTHER

## 2022-06-21 RX ORDER — ATORVASTATIN CALCIUM 80 MG/1
TABLET, FILM COATED ORAL
Qty: 30 TABLET | Refills: 5 | Status: SHIPPED | OUTPATIENT
Start: 2022-06-21

## 2022-07-15 ENCOUNTER — TELEPHONE (OUTPATIENT)
Dept: CARDIOLOGY CLINIC | Facility: CLINIC | Age: 77
End: 2022-07-15

## 2022-07-15 NOTE — TELEPHONE ENCOUNTER
Phone call to patient  Received fax from [phone] requesting 90 day supply refill for Atorvastatin  Per patient, his insurance will not pay for 90 day supply  Faxed script back to [facility] with patients information

## 2022-07-18 DIAGNOSIS — E78.5 DYSLIPIDEMIA: ICD-10-CM

## 2022-07-18 DIAGNOSIS — I25.10 CORONARY ARTERY DISEASE INVOLVING NATIVE CORONARY ARTERY OF NATIVE HEART WITHOUT ANGINA PECTORIS: ICD-10-CM

## 2022-07-18 DIAGNOSIS — I25.10 CORONARY ARTERY DISEASE INVOLVING NATIVE HEART: ICD-10-CM

## 2022-07-18 RX ORDER — METOPROLOL SUCCINATE 100 MG/1
100 TABLET, EXTENDED RELEASE ORAL DAILY
Qty: 90 TABLET | Refills: 1 | Status: SHIPPED | OUTPATIENT
Start: 2022-07-18

## 2022-07-18 RX ORDER — EZETIMIBE 10 MG/1
10 TABLET ORAL DAILY
Qty: 90 TABLET | Refills: 1 | Status: SHIPPED | OUTPATIENT
Start: 2022-07-18

## 2022-07-21 DIAGNOSIS — N40.1 BENIGN PROSTATIC HYPERPLASIA WITH URINARY OBSTRUCTION: ICD-10-CM

## 2022-07-21 DIAGNOSIS — N13.8 BENIGN PROSTATIC HYPERPLASIA WITH URINARY OBSTRUCTION: ICD-10-CM

## 2022-07-21 RX ORDER — FINASTERIDE 5 MG/1
TABLET, FILM COATED ORAL
Qty: 90 TABLET | Refills: 3 | Status: SHIPPED | OUTPATIENT
Start: 2022-07-21

## 2022-10-11 ENCOUNTER — TELEMEDICINE (OUTPATIENT)
Dept: FAMILY MEDICINE CLINIC | Facility: CLINIC | Age: 77
End: 2022-10-11
Payer: MEDICARE

## 2022-10-11 DIAGNOSIS — R05.2 SUBACUTE COUGH: Primary | ICD-10-CM

## 2022-10-11 PROCEDURE — 99442 PR PHYS/QHP TELEPHONE EVALUATION 11-20 MIN: CPT | Performed by: INTERNAL MEDICINE

## 2022-10-11 NOTE — PROGRESS NOTES
Virtual Brief Visit    Patient is located in the following state in which I hold an active license [place]      Assessment/Plan:    Problem List Items Addressed This Visit        Other    Subacute cough - Primary        Patient called today with complaints of 10 days of cough which is getting better  He is afebrile  No shortness of breath  Overall his symptoms are improving  We discussed with him most likely it is postviral cough it may take up to 6 weeks to completely resolve  In the meantime he was instructed to call me if any worsening of the symptoms  Continue with Mucinex  We decided not to test for COVID-19 because it is not going to  for him  Recent Visits  No visits were found meeting these conditions  Showing recent visits within past 7 days and meeting all other requirements  Today's Visits  Date Type Provider Dept   10/11/22 Ernesto Pettit MD [facility]   Showing today's visits and meeting all other requirements  Future Appointments  No visits were found meeting these conditions    Showing future appointments within next 150 days and meeting all other requirements         I spent 20 minutes directly with the patient during this visit

## 2022-10-21 ENCOUNTER — OFFICE VISIT (OUTPATIENT)
Dept: CARDIOLOGY CLINIC | Facility: CLINIC | Age: 77
End: 2022-10-21
Payer: MEDICARE

## 2022-10-21 VITALS
HEART RATE: 69 BPM | BODY MASS INDEX: 24.4 KG/M2 | WEIGHT: 165.2 LBS | SYSTOLIC BLOOD PRESSURE: 146 MMHG | DIASTOLIC BLOOD PRESSURE: 74 MMHG

## 2022-10-21 DIAGNOSIS — E78.5 DYSLIPIDEMIA: ICD-10-CM

## 2022-10-21 DIAGNOSIS — I10 BENIGN ESSENTIAL HTN: ICD-10-CM

## 2022-10-21 DIAGNOSIS — I25.10 CORONARY ARTERY DISEASE INVOLVING NATIVE CORONARY ARTERY OF NATIVE HEART WITHOUT ANGINA PECTORIS: Primary | ICD-10-CM

## 2022-10-21 PROCEDURE — 93000 ELECTROCARDIOGRAM COMPLETE: CPT | Performed by: INTERNAL MEDICINE

## 2022-10-21 PROCEDURE — 99214 OFFICE O/P EST MOD 30 MIN: CPT | Performed by: INTERNAL MEDICINE

## 2022-10-21 NOTE — PROGRESS NOTES
Cardiology             Nile Misael  1945  [id number]              Discussion/Summary:     CAD, prior PCI/ARIK to the left circumflex artery in 2013, setting of NSTEMI  Dyslipidemia  Hypertension  Peripheral arterial disease with greater than 70% right SFA stenosis on prior Dopplers in 2015, without claudication  Moderate aortic insufficiency  Abdominal aortic ectasia           No symptoms of angina, continue low-dose aspirin, high-dose atorvastatin, ezetimibe  Blood pressure acceptable, slightly high today which he attributes to his upper respiratory infection  Continue losartan metoprolol  Prior lipid panel reviewed 03/2022, LDL cholesterol 59 mg/dL, continue high-dose atorvastatin, ezetimibe  Prior carotid Dopplers 10/2018 with less than 50% bilateral stenosis   Will repeat after next visit  Will recheck abdominal aortic ultrasound after 2 years, as prior study in 2018 had slight ectasia of the proximal and distal abdominal aorta           Follow-up in  6 months  Will plan to repeat carotid Dopplers next year         Interval History:      This is a very pleasant 52-year-old male with a history of CAD status post PCI/ARIK to the mid circumflex in 2013, setting of NSTEMI, as well as a history of hypertension and dyslipidemia   He followed at the [address] [place] [address] up until 01/2018 when he transition to our practice  [facility] nuclear stress test 06/2013 after left circumflex stenting reported mid to distal inferolateral scar with mild heath-infarct ischemia   Ejection fraction has been within normal limits by prior echocardiogram 06/2018 without regional wall motion abnormality and moderate aortic insufficiency  Prior lower extremity arterial Dopplers in 2015 revealed greater than 75% stenosis of the right distal SFA      He underwent an echocardiogram 09/06/2019 which revealed ejection fraction 55% with possible basal inferior hypokinesis      He presents today for follow-up and complaints of flu-like symptoms  He states he has body aches and upper respiratory congestion  He denies chest pain or shortness of breath  He denies lower extremity edema, orthopnea, palpitations, lightheadedness  Vitals:  Vitals:    10/21/22 0853   BP: 146/74   BP Location: Right arm   Patient Position: Sitting   Cuff Size: Large   Pulse: 69   Weight: 74 9 kg (165 lb 3 2 oz)         Past Medical History:   Diagnosis Date   • Coronary artery disease    • Full dentures     upper and lower   • GERD (gastroesophageal reflux disease)    • Hyperlipidemia    • Hypertension    • Inguinal hernia 02/2018    right side   • Myocardial infarction [facility])    • Pneumonia    • Sciatica    • Urinary retention 2/13/2019   • Wears glasses      Social History     Socioeconomic History   • Marital status: /Civil Union     Spouse name: Not on file   • Number of children: Not on file   • Years of education: Not on file   • Highest education level: Not on file   Occupational History   • Not on file   Tobacco Use   • Smoking status: Current Every Day Smoker     Packs/day: 0 50     Years: 40 00     Pack years: 20 00     Types: Cigarettes   • Smokeless tobacco: Never Used   Vaping Use   • Vaping Use: Never used   Substance and Sexual Activity   • Alcohol use:  Yes     Alcohol/week: 10 0 standard drinks     Types: 10 Cans of beer per week     Comment: social    • Drug use: No   • Sexual activity: Not on file   Other Topics Concern   • Not on file   Social History Narrative   • Not on file     Social Determinants of Health     Financial Resource Strain: Not on file   Food Insecurity: Not on file   Transportation Needs: Not on file   Physical Activity: Not on file   Stress: Not on file   Social Connections: Not on file   Intimate Partner Violence: Not on file   Housing Stability: Not on file      Family History   Problem Relation Age of Onset   • Gallbladder disease Mother      Past Surgical History:   Procedure Laterality Date   • APPENDECTOMY     • CORONARY ANGIOPLASTY WITH STENT PLACEMENT  05/2013    x1   • CYSTOSCOPY  08/21/2020   • HERNIA REPAIR Left     inguinal hernia    • LAPAROSCOPIC INGUINAL HERNIA REPAIR Right 02/08/2018    ROBOTIC REPAIR WITH MESH-MANASA TAPIA MD   • TONSILLECTOMY     • WISDOM TOOTH EXTRACTION         Current Outpatient Medications:   •  aspirin 81 mg chewable tablet, Chew 81 mg daily, Disp: , Rfl:   •  atorvastatin (LIPITOR) 80 mg tablet, TAKE 1 TABLET BY MOUTH EVERY DAY, Disp: 30 tablet, Rfl: 5  •  buPROPion (WELLBUTRIN SR) 150 mg 12 hr tablet, Take 1 tablet (150 mg total) by mouth 2 (two) times a day, Disp: 60 tablet, Rfl: 2  •  ezetimibe (ZETIA) 10 mg tablet, Take 1 tablet (10 mg total) by mouth daily, Disp: 90 tablet, Rfl: 1  •  finasteride (PROSCAR) 5 mg tablet, TAKE 1 TABLET BY MOUTH EVERY DAY, Disp: 90 tablet, Rfl: 3  •  losartan (COZAAR) 50 mg tablet, Take 1 tablet (50 mg total) by mouth daily, Disp: 90 tablet, Rfl: 5  •  metoprolol succinate (TOPROL-XL) 100 mg 24 hr tablet, Take 1 tablet (100 mg total) by mouth daily, Disp: 90 tablet, Rfl: 1  •  tamsulosin (FLOMAX) 0 4 mg, Take 1 capsule (0 4 mg total) by mouth daily with dinner, Disp: 90 capsule, Rfl: 3        Review of Systems:  Review of Systems   Respiratory: Negative  Cardiovascular: Negative  All other systems reviewed and are negative  Physical Exam:  Physical Exam  Constitutional:       General: He is not in acute distress  Appearance: He is well-developed  He is not diaphoretic  HENT:      Head: Normocephalic and atraumatic  Eyes:      General: No scleral icterus  Right eye: No discharge  Pupils: Pupils are equal, round, and reactive to light  Neck:      Thyroid: No thyromegaly  Cardiovascular:      Rate and Rhythm: Normal rate and regular rhythm  Heart sounds: Normal heart sounds  No murmur heard  No friction rub  No gallop     Pulmonary:      Effort: Pulmonary effort is normal       Breath sounds: Normal breath sounds  Abdominal:      General: There is no distension  Tenderness: There is no abdominal tenderness  There is no guarding or rebound  Musculoskeletal:         General: Normal range of motion  Cervical back: Normal range of motion and neck supple  Skin:     General: Skin is warm and dry  Coloration: Skin is not pale  Findings: No erythema or rash  Neurological:      Mental Status: He is alert and oriented to person, place, and time  Coordination: Coordination normal    Psychiatric:         Behavior: Behavior normal          Thought Content: Thought content normal          Judgment: Judgment normal          This note was completed in part utilizing [employer] Direct Software  Grammatical errors, random word insertions, spelling mistakes, and incomplete sentences can be an occasional consequence of this system secondary to software limitations, ambient noise, and hardware issues  If you have any questions or concerns about the content, text, or information contained within the body of this dictation, please contact the provider for clarification

## 2022-10-27 ENCOUNTER — CLINICAL SUPPORT (OUTPATIENT)
Dept: FAMILY MEDICINE CLINIC | Facility: CLINIC | Age: 77
End: 2022-10-27

## 2022-10-27 DIAGNOSIS — R05.2 SUBACUTE COUGH: ICD-10-CM

## 2022-10-27 DIAGNOSIS — R05.9 COUGH, UNSPECIFIED TYPE: Primary | ICD-10-CM

## 2022-10-27 DIAGNOSIS — R68.89 OTHER GENERAL SYMPTOMS AND SIGNS: ICD-10-CM

## 2022-10-27 DIAGNOSIS — R09.89 CHEST CONGESTION: ICD-10-CM

## 2022-10-27 PROCEDURE — 87636 SARSCOV2 & INF A&B AMP PRB: CPT | Performed by: INTERNAL MEDICINE

## 2022-10-28 LAB
FLUAV RNA RESP QL NAA+PROBE: NEGATIVE
FLUBV RNA RESP QL NAA+PROBE: NEGATIVE
SARS-COV-2 RNA RESP QL NAA+PROBE: NEGATIVE

## 2022-12-10 PROBLEM — R05.2 SUBACUTE COUGH: Status: RESOLVED | Noted: 2022-10-11 | Resolved: 2022-12-10

## 2022-12-14 DIAGNOSIS — R33.9 URINARY RETENTION: ICD-10-CM

## 2022-12-14 RX ORDER — TAMSULOSIN HYDROCHLORIDE 0.4 MG/1
CAPSULE ORAL
Qty: 90 CAPSULE | Refills: 3 | Status: SHIPPED | OUTPATIENT
Start: 2022-12-14

## 2022-12-15 DIAGNOSIS — I25.10 CORONARY ARTERY DISEASE INVOLVING NATIVE CORONARY ARTERY OF NATIVE HEART WITHOUT ANGINA PECTORIS: ICD-10-CM

## 2022-12-15 DIAGNOSIS — E78.5 DYSLIPIDEMIA: ICD-10-CM

## 2022-12-15 RX ORDER — ATORVASTATIN CALCIUM 80 MG/1
TABLET, FILM COATED ORAL
Qty: 30 TABLET | Refills: 5 | Status: SHIPPED | OUTPATIENT
Start: 2022-12-15

## 2023-01-28 DIAGNOSIS — I25.10 CORONARY ARTERY DISEASE INVOLVING NATIVE HEART: ICD-10-CM

## 2023-01-28 DIAGNOSIS — E78.5 DYSLIPIDEMIA: ICD-10-CM

## 2023-01-28 DIAGNOSIS — I25.10 CORONARY ARTERY DISEASE INVOLVING NATIVE CORONARY ARTERY OF NATIVE HEART WITHOUT ANGINA PECTORIS: ICD-10-CM

## 2023-01-29 DIAGNOSIS — I10 HTN (HYPERTENSION), BENIGN: ICD-10-CM

## 2023-01-30 RX ORDER — EZETIMIBE 10 MG/1
TABLET ORAL
Qty: 30 TABLET | Refills: 5 | Status: SHIPPED | OUTPATIENT
Start: 2023-01-30

## 2023-01-30 RX ORDER — METOPROLOL SUCCINATE 100 MG/1
TABLET, EXTENDED RELEASE ORAL
Qty: 30 TABLET | Refills: 5 | Status: SHIPPED | OUTPATIENT
Start: 2023-01-30

## 2023-01-30 RX ORDER — LOSARTAN POTASSIUM 50 MG/1
TABLET ORAL
Qty: 30 TABLET | Refills: 17 | Status: SHIPPED | OUTPATIENT
Start: 2023-01-30

## 2023-02-24 DIAGNOSIS — I25.10 CORONARY ARTERY DISEASE INVOLVING NATIVE CORONARY ARTERY OF NATIVE HEART WITHOUT ANGINA PECTORIS: ICD-10-CM

## 2023-02-24 DIAGNOSIS — I10 HTN (HYPERTENSION), BENIGN: ICD-10-CM

## 2023-02-24 DIAGNOSIS — E78.5 DYSLIPIDEMIA: ICD-10-CM

## 2023-02-24 RX ORDER — ATORVASTATIN CALCIUM 80 MG/1
80 TABLET, FILM COATED ORAL DAILY
Qty: 90 TABLET | Refills: 1 | Status: SHIPPED | OUTPATIENT
Start: 2023-02-24

## 2023-02-24 RX ORDER — METOPROLOL SUCCINATE 100 MG/1
100 TABLET, EXTENDED RELEASE ORAL DAILY
Qty: 90 TABLET | Refills: 1 | Status: SHIPPED | OUTPATIENT
Start: 2023-02-24

## 2023-02-24 RX ORDER — LOSARTAN POTASSIUM 50 MG/1
50 TABLET ORAL DAILY
Qty: 90 TABLET | Refills: 1 | Status: SHIPPED | OUTPATIENT
Start: 2023-02-24

## 2023-02-28 ENCOUNTER — TELEPHONE (OUTPATIENT)
Dept: CARDIOLOGY CLINIC | Facility: CLINIC | Age: 78
End: 2023-02-28

## 2023-02-28 NOTE — TELEPHONE ENCOUNTER
Phone call to patient  We received a refill fax from [address] [place] [address] requesting 90 day supply refill for Ezetimibe 10mg  Patient states does no want 90 day supply, insurance will only cover 30 day supply      Faxed back 90 day supply denied to [facility]

## 2023-04-24 ENCOUNTER — TELEPHONE (OUTPATIENT)
Dept: FAMILY MEDICINE CLINIC | Facility: CLINIC | Age: 78
End: 2023-04-24

## 2023-04-24 NOTE — TELEPHONE ENCOUNTER
Date/Time: 4-24-23 / 3:52 PM    Pt called in stating that his address is correct, and he never received a postcard    He did set up an appt with Dr Philippe Robles for tomorrow @ 10:20 AM

## 2023-04-25 ENCOUNTER — OFFICE VISIT (OUTPATIENT)
Dept: FAMILY MEDICINE CLINIC | Facility: CLINIC | Age: 78
End: 2023-04-25

## 2023-04-25 VITALS
HEIGHT: 69 IN | TEMPERATURE: 96.1 F | HEART RATE: 52 BPM | OXYGEN SATURATION: 98 % | DIASTOLIC BLOOD PRESSURE: 58 MMHG | BODY MASS INDEX: 24.82 KG/M2 | WEIGHT: 167.6 LBS | SYSTOLIC BLOOD PRESSURE: 120 MMHG

## 2023-04-25 DIAGNOSIS — Z23 NEED FOR PROPHYLACTIC VACCINATION AND INOCULATION AGAINST VARICELLA: ICD-10-CM

## 2023-04-25 DIAGNOSIS — Z11.59 NEED FOR HEPATITIS C SCREENING TEST: ICD-10-CM

## 2023-04-25 DIAGNOSIS — I25.10 ASCVD (ARTERIOSCLEROTIC CARDIOVASCULAR DISEASE): ICD-10-CM

## 2023-04-25 DIAGNOSIS — Z13.29 SCREENING FOR HYPOTHYROIDISM: ICD-10-CM

## 2023-04-25 DIAGNOSIS — R09.81 NASAL CONGESTION: ICD-10-CM

## 2023-04-25 DIAGNOSIS — Z87.891 PERSONAL HISTORY OF NICOTINE DEPENDENCE: ICD-10-CM

## 2023-04-25 DIAGNOSIS — Z13.0 SCREENING FOR DEFICIENCY ANEMIA: ICD-10-CM

## 2023-04-25 DIAGNOSIS — Z12.11 COLON CANCER SCREENING: ICD-10-CM

## 2023-04-25 DIAGNOSIS — Z13.1 SCREENING FOR DIABETES MELLITUS: ICD-10-CM

## 2023-04-25 DIAGNOSIS — I10 ESSENTIAL HYPERTENSION: ICD-10-CM

## 2023-04-25 DIAGNOSIS — Z12.2 SCREENING FOR LUNG CANCER: ICD-10-CM

## 2023-04-25 DIAGNOSIS — R79.9 ABNORMAL FINDING OF BLOOD CHEMISTRY, UNSPECIFIED: ICD-10-CM

## 2023-04-25 DIAGNOSIS — Z13.89 SCREENING FOR HEMATURIA OR PROTEINURIA: ICD-10-CM

## 2023-04-25 DIAGNOSIS — I49.8 SINUS ARRHYTHMIA: ICD-10-CM

## 2023-04-25 DIAGNOSIS — E78.2 MIXED HYPERLIPIDEMIA: ICD-10-CM

## 2023-04-25 DIAGNOSIS — Z00.00 MEDICARE ANNUAL WELLNESS VISIT, SUBSEQUENT: ICD-10-CM

## 2023-04-25 DIAGNOSIS — I73.9 PERIPHERAL ARTERIAL DISEASE (HCC): Primary | ICD-10-CM

## 2023-04-25 DIAGNOSIS — Z12.5 SCREENING FOR PROSTATE CANCER: ICD-10-CM

## 2023-04-25 LAB
HBA1C MFR BLD HPLC: 6.1 %
HBA1C MFR BLD HPLC: 6.1 %

## 2023-04-25 RX ORDER — FLUTICASONE PROPIONATE 50 MCG
2 SPRAY, SUSPENSION (ML) NASAL DAILY
Qty: 16 G | Refills: 2 | Status: SHIPPED | OUTPATIENT
Start: 2023-04-25

## 2023-04-25 RX ORDER — ZOSTER VACCINE RECOMBINANT, ADJUVANTED 50 MCG/0.5
0.5 KIT INTRAMUSCULAR ONCE
Qty: 1 EACH | Refills: 1 | Status: SHIPPED | OUTPATIENT
Start: 2023-04-25 | End: 2023-04-25

## 2023-04-25 NOTE — ASSESSMENT & PLAN NOTE
Blood pressure is very well controlled on current therapy with losartan, metoprolol  Continue the same  Recheck electrolytes

## 2023-04-25 NOTE — PROGRESS NOTES
Assessment and Plan:     Problem List Items Addressed This Visit        Cardiovascular and Mediastinum    ASCVD (arteriosclerotic cardiovascular disease)     Recheck lipid panel, follow-up with cardiology  He is on high intensity statin and Zetia  Relevant Orders    Comprehensive metabolic panel    Lipid panel    Peripheral arterial disease ([facility] [address] )    Sinus arrhythmia     Patient with significant sinus arrhythmia, will check electrolytes  Follow-up with cardiology tomorrow  EKG was reviewed and uploaded to the system  Patient is completely asymptomatic  Relevant Orders    Magnesium    Essential hypertension     Blood pressure is very well controlled on current therapy with losartan, metoprolol  Continue the same  Recheck electrolytes  Other    Hyperlipidemia     Recheck lipid panel, continue current therapy           Medicare annual wellness visit, subsequent - Primary   Other Visit Diagnoses     Need for hepatitis C screening test        Screening for hematuria or proteinuria        Relevant Orders    UA (URINE) with reflex to Scope    Screening for diabetes mellitus        Relevant Orders    HEMOGLOBIN A1C W/ EAG ESTIMATION    Screening for deficiency anemia        Relevant Orders    CBC and differential    Screening for hypothyroidism        Relevant Orders    TSH, 3rd generation with Free T4 reflex    Need for prophylactic vaccination and inoculation against varicella        Relevant Medications    Zoster Vac Recomb Adjuvanted (Shingrix) 50 MCG/0 5ML SUSR    Abnormal finding of blood chemistry, unspecified        Relevant Orders    HEMOGLOBIN A1C W/ EAG ESTIMATION    Colon cancer screening        Relevant Orders    Ambulatory Referral to Colorectal Surgery    Screening for prostate cancer        Relevant Orders    PSA, Total Screen    Screening for lung cancer        Relevant Orders    CT lung screening program    Personal history of nicotine dependence        Relevant Orders    CT lung screening program    Nasal congestion        Relevant Medications    fluticasone (FLONASE) 50 mcg/act nasal spray           Preventive health issues were discussed with patient, and age appropriate screening tests were ordered as noted in patient's After Visit Summary  Personalized health advice and appropriate referrals for health education or preventive services given if needed, as noted in patient's After Visit Summary  History of Present Illness:     Patient presents for a Medicare Wellness Visit    Patient came today for Medicare wellness visit and to follow-up on his multiple medical problems  He still continues to smoke, does not drink alcohol  He is vital signs are overall acceptable, his heart rate is very irregular  He is due for his colonoscopy, referral was placed  Agreed for PSA  Agreed for CT scan of the chest for lung cancer screening  Patient Care Team:  Kye Petit MD as PCP - General (Internal Medicine)     Review of Systems:     Review of Systems   Constitutional: Negative for chills, fatigue and fever  HENT: Positive for congestion  Respiratory: Negative for cough, chest tightness and shortness of breath  Cardiovascular: Negative for chest pain, palpitations and leg swelling  Gastrointestinal: Negative for abdominal distention, abdominal pain, blood in stool, constipation, diarrhea and nausea  Genitourinary: Negative for difficulty urinating and dysuria  Musculoskeletal: Negative for arthralgias, back pain, gait problem, joint swelling, myalgias and neck pain  Skin: Negative for rash  Neurological: Negative for dizziness, weakness, numbness and headaches  Psychiatric/Behavioral: Negative for agitation          Problem List:     Patient Active Problem List   Diagnosis   • ASCVD (arteriosclerotic cardiovascular disease)   • Osteopenia   • Hearing loss   • Hyperlipidemia   • Inguinal hernia, right   • Peripheral arterial disease ([facility] [address] )   • Polyp of sigmoid colon   • Esophageal reflux disease   • Epididymoorchitis   • Sinus arrhythmia   • Medicare annual wellness visit, subsequent   • Essential hypertension      Past Medical and Surgical History:     Past Medical History:   Diagnosis Date   • Coronary artery disease    • Full dentures     upper and lower   • GERD (gastroesophageal reflux disease)    • Hyperlipidemia    • Hypertension    • Inguinal hernia 02/2018    right side   • Myocardial infarction [facility])    • Pneumonia    • Sciatica    • Urinary retention 2/13/2019   • Wears glasses      Past Surgical History:   Procedure Laterality Date   • APPENDECTOMY     • CORONARY ANGIOPLASTY WITH STENT PLACEMENT  05/2013    x1   • CYSTOSCOPY  08/21/2020   • HERNIA REPAIR Left     inguinal hernia    • LAPAROSCOPIC INGUINAL HERNIA REPAIR Right 02/08/2018    ROBOTIC REPAIR WITH MESH-MANASA TAPIA MD   • TONSILLECTOMY     • WISDOM TOOTH EXTRACTION        Family History:     Family History   Problem Relation Age of Onset   • Gallbladder disease Mother       Social History:     Social History     Socioeconomic History   • Marital status: /Civil Union     Spouse name: None   • Number of children: None   • Years of education: None   • Highest education level: None   Occupational History   • None   Tobacco Use   • Smoking status: Every Day     Packs/day: 0 50     Years: 40 00     Pack years: 20 00     Types: Cigarettes   • Smokeless tobacco: Never   Vaping Use   • Vaping Use: Never used   Substance and Sexual Activity   • Alcohol use:  Yes     Alcohol/week: 10 0 standard drinks     Types: 10 Cans of beer per week     Comment: social    • Drug use: No   • Sexual activity: None   Other Topics Concern   • None   Social History Narrative   • None     Social Determinants of Health     Financial Resource Strain: Low Risk    • Difficulty of Paying Living Expenses: Not hard at all   Food Insecurity: Not on file   Transportation Needs: No Transportation Needs   • Lack of Transportation (Medical): No   • Lack of Transportation (Non-Medical): No   Physical Activity: Not on file   Stress: Not on file   Social Connections: Not on file   Intimate Partner Violence: Not on file   Housing Stability: Not on file      Medications and Allergies:     Current Outpatient Medications   Medication Sig Dispense Refill   • aspirin 81 mg chewable tablet Chew 81 mg daily     • atorvastatin (LIPITOR) 80 mg tablet Take 1 tablet (80 mg total) by mouth daily 90 tablet 1   • ezetimibe (ZETIA) 10 mg tablet TAKE 1 TABLET BY MOUTH EVERY DAY 30 tablet 5   • finasteride (PROSCAR) 5 mg tablet TAKE 1 TABLET BY MOUTH EVERY DAY 90 tablet 3   • fluticasone (FLONASE) 50 mcg/act nasal spray 2 sprays into each nostril daily 16 g 2   • losartan (COZAAR) 50 mg tablet Take 1 tablet (50 mg total) by mouth daily 90 tablet 1   • metoprolol succinate (TOPROL-XL) 100 mg 24 hr tablet Take 1 tablet (100 mg total) by mouth daily 90 tablet 1   • tamsulosin (FLOMAX) 0 4 mg TAKE 1 CAPSULE BY MOUTH EVERY DAY WITH DINNER 90 capsule 3   • Zoster Vac Recomb Adjuvanted (Shingrix) 50 MCG/0 5ML SUSR Inject 0 5 mL into a muscle once for 1 dose Repeat dose in 2 to 6 months 1 each 1   • buPROPion (WELLBUTRIN SR) 150 mg 12 hr tablet Take 1 tablet (150 mg total) by mouth 2 (two) times a day (Patient not taking: Reported on 4/25/2023) 60 tablet 2     No current facility-administered medications for this visit       No Known Allergies   Immunizations:     Immunization History   Administered Date(s) Administered   • COVID-19 PFIZER VACCINE 0 3 ML IM 04/02/2021, 04/23/2021, 12/01/2021   • COVID-19 Pfizer Vac BIVALENT David-sucrose 12 Yr+ IM (BOOSTER ONLY) 11/17/2022   • INFLUENZA 11/02/2017, 09/15/2018, 10/08/2019, 10/29/2021, 10/02/2022   • Influenza Split High Dose Preservative Free IM 09/23/2014, 10/01/2017   • Influenza, high dose seasonal 0 7 mL 10/02/2022   • Pneumococcal Conjugate 13-Valent 01/05/2018   • Pneumococcal Polysaccharide PPV23 09/23/2014      Health Maintenance:         Topic Date Due   • Hepatitis C Screening  Never done   • Lung Cancer Screening  07/24/2016   • Colorectal Cancer Screening  04/25/2024 (Originally 11/20/1990)     There are no preventive care reminders to display for this patient  Medicare Screening Tests and Risk Assessments:     Shelly Pink is here for his Subsequent Wellness visit  Health Risk Assessment:   Patient rates overall health as good  Patient feels that their physical health rating is same  Patient is very satisfied with their life  Eyesight was rated as same  Hearing was rated as slightly worse  Patient feels that their emotional and mental health rating is much better  Patients states they are never, rarely angry  Patient states they are never, rarely unusually tired/fatigued  Pain experienced in the last 7 days has been none  Patient states that he has experienced no weight loss or gain in last 6 months  Fall Risk Screening: In the past year, patient has experienced: no history of falling in past year      Home Safety:  Patient does not have trouble with stairs inside or outside of their home  Patient has working smoke alarms and has working carbon monoxide detector  Home safety hazards include: none  Nutrition:   Current diet is Regular  Medications:   Patient is currently taking over-the-counter supplements  OTC medications include: see medication list  Patient is able to manage medications  Activities of Daily Living (ADLs)/Instrumental Activities of Daily Living (IADLs):   Walk and transfer into and out of bed and chair?: Yes  Dress and groom yourself?: Yes    Bathe or shower yourself?: Yes    Feed yourself?  Yes  Do your laundry/housekeeping?: Yes  Manage your money, pay your bills and track your expenses?: Yes  Make your own meals?: Yes    Do your own shopping?: Yes    Previous Hospitalizations:   Any hospitalizations or ED visits within the last 12 months?: No      Advance Care "Planning:   Living will: Yes    Durable POA for healthcare: Yes    Advanced directive: Yes      PREVENTIVE SCREENINGS      Cardiovascular Screening:    General: Screening Not Indicated and History Lipid Disorder      Prostate Cancer Screening:    General: Screening Not Indicated      Abdominal Aortic Aneurysm (AAA) Screening:    Risk factors include: tobacco use      Screening, Brief Intervention, and Referral to Treatment (SBIRT)    Screening  Typical number of drinks in a day: 0  Typical number of drinks in a week: 3  Interpretation: Low risk drinking behavior  Single Item Drug Screening:  How often have you used an illegal drug (including marijuana) or a prescription medication for non-medical reasons in the past year? never    Single Item Drug Screen Score: 0  Interpretation: Negative screen for possible drug use disorder    No results found  Physical Exam:     /58 (BP Location: Left arm, Patient Position: Sitting, Cuff Size: Standard)   Pulse (!) 52   Temp (!) 96 1 °F (35 6 °C) (Temporal)   Ht 5' 9\" (1 753 m)   Wt 76 kg (167 lb 9 6 oz)   SpO2 98%   BMI 24 75 kg/m²     Physical Exam  Constitutional:       General: He is not in acute distress  Appearance: He is not ill-appearing or toxic-appearing  Cardiovascular:      Rate and Rhythm: Tachycardia present  Rhythm irregular  Heart sounds: Murmur heard  No gallop  Pulmonary:      Effort: No respiratory distress  Breath sounds: No wheezing or rales  Abdominal:      General: There is no distension  Tenderness: There is no abdominal tenderness  There is no guarding            Shayy Pickard MD  "

## 2023-04-25 NOTE — ASSESSMENT & PLAN NOTE
Patient with significant sinus arrhythmia, will check electrolytes  Follow-up with cardiology tomorrow  EKG was reviewed and uploaded to the system  Patient is completely asymptomatic

## 2023-04-26 ENCOUNTER — OFFICE VISIT (OUTPATIENT)
Dept: CARDIOLOGY CLINIC | Facility: CLINIC | Age: 78
End: 2023-04-26

## 2023-04-26 VITALS
HEART RATE: 52 BPM | WEIGHT: 166.6 LBS | BODY MASS INDEX: 24.6 KG/M2 | DIASTOLIC BLOOD PRESSURE: 72 MMHG | SYSTOLIC BLOOD PRESSURE: 138 MMHG

## 2023-04-26 DIAGNOSIS — I71.40 ABDOMINAL AORTIC ANEURYSM (AAA) WITHOUT RUPTURE, UNSPECIFIED PART (HCC): ICD-10-CM

## 2023-04-26 DIAGNOSIS — I77.9 CAROTID ARTERY DISEASE, UNSPECIFIED LATERALITY, UNSPECIFIED TYPE (HCC): ICD-10-CM

## 2023-04-26 DIAGNOSIS — I35.1 AORTIC VALVE INSUFFICIENCY, ETIOLOGY OF CARDIAC VALVE DISEASE UNSPECIFIED: Primary | ICD-10-CM

## 2023-04-26 NOTE — PROGRESS NOTES
Cardiology             Sonali Liu  1945  [id number]              Discussion/Summary:     CAD, prior PCI/ARIK to the left circumflex artery in 2013, setting of NSTEMI  Dyslipidemia  Hypertension  Peripheral arterial disease with greater than 70% right SFA stenosis on prior Dopplers in 2015, without claudication  Moderate aortic insufficiency  Abdominal aortic ectasia           Patient without symptoms of angina, continue low-dose aspirin, high-dose atorvastatin  Blood pressure controlled, continue losartan 50 mg daily, metoprolol succinate 100 mg daily  Prior lipid panel reviewed, very well controlled, continue atorvastatin 80 mg daily and ezetimibe  We will repeat echocardiogram before next visit in 6 months to reevaluate moderate aortic insufficiency  Patient reluctant to follow-up with carotid Dopplers and abdominal aortic ultrasound as he has been paying a lot of medical bills for his wife  We will plan to repeat the studies after next visit  Prior carotid Dopplers 10/2019 with less than 50% bilateral stenosis and prior abdominal aortic ultrasound 10/2019 with slight ectasia up to 2 6 cm in the proximal segment             Follow-up in  6 months   Will plan to repeat carotid Dopplers  and abdominal aortic ultrasound after next visit  Echocardiogram just before next visit to reevaluate AR        Interval History:      This is a very pleasant 67-year-old male with a history of CAD status post PCI/ARIK to the mid circumflex in 2013, setting of NSTEMI, as well as a history of hypertension and dyslipidemia   He followed at the [address] [place] [address] up until 01/2018 when he transition to our practice  Iliana Chaidez nuclear stress test 06/2013 after left circumflex stenting reported mid to distal inferolateral scar with mild heath-infarct ischemia   Ejection fraction has been within normal limits by prior echocardiogram 06/2018 without regional wall motion abnormality and moderate aortic insufficiency    Prior lower extremity arterial Dopplers in 2015 revealed greater than 75% stenosis of the right distal SFA      He underwent an echocardiogram 09/06/2019 which revealed ejection fraction 55% with possible basal inferior hypokinesis  He presents today for follow-up with no complaints  He denies exertional chest pain, shortness of breath, dizziness, patient, lower extremity edema  He has not been active, as he states his wife has been sick and has been taking well care of her  He plans to start walking more regularly during the spring and summer months  Vitals:  Vitals:    04/26/23 0847   BP: 138/72   BP Location: Left arm   Patient Position: Sitting   Cuff Size: Standard   Pulse: (!) 52   Weight: 75 6 kg (166 lb 9 6 oz)         Past Medical History:   Diagnosis Date   • Coronary artery disease    • Full dentures     upper and lower   • GERD (gastroesophageal reflux disease)    • Hyperlipidemia    • Hypertension    • Inguinal hernia 02/2018    right side   • Myocardial infarction [facility])    • Pneumonia    • Sciatica    • Urinary retention 2/13/2019   • Wears glasses      Social History     Socioeconomic History   • Marital status: /Civil Union     Spouse name: Not on file   • Number of children: Not on file   • Years of education: Not on file   • Highest education level: Not on file   Occupational History   • Not on file   Tobacco Use   • Smoking status: Every Day     Packs/day: 0 50     Years: 40 00     Pack years: 20 00     Types: Cigarettes   • Smokeless tobacco: Never   Vaping Use   • Vaping Use: Never used   Substance and Sexual Activity   • Alcohol use:  Yes     Alcohol/week: 10 0 standard drinks     Types: 10 Cans of beer per week     Comment: social    • Drug use: No   • Sexual activity: Not on file   Other Topics Concern   • Not on file   Social History Narrative   • Not on file     Social Determinants of Health     Financial Resource Strain: Low Risk    • Difficulty of Paying Living Expenses: Not hard at all   Food Insecurity: Not on file   Transportation Needs: No Transportation Needs   • Lack of Transportation (Medical): No   • Lack of Transportation (Non-Medical): No   Physical Activity: Not on file   Stress: Not on file   Social Connections: Not on file   Intimate Partner Violence: Not on file   Housing Stability: Not on file      Family History   Problem Relation Age of Onset   • Gallbladder disease Mother      Past Surgical History:   Procedure Laterality Date   • APPENDECTOMY     • CORONARY ANGIOPLASTY WITH STENT PLACEMENT  05/2013    x1   • CYSTOSCOPY  08/21/2020   • HERNIA REPAIR Left     inguinal hernia    • LAPAROSCOPIC INGUINAL HERNIA REPAIR Right 02/08/2018    ROBOTIC REPAIR WITH MESH-MANASA TAPIA MD   • TONSILLECTOMY     • WISDOM TOOTH EXTRACTION         Current Outpatient Medications:   •  aspirin 81 mg chewable tablet, Chew 81 mg daily, Disp: , Rfl:   •  atorvastatin (LIPITOR) 80 mg tablet, Take 1 tablet (80 mg total) by mouth daily, Disp: 90 tablet, Rfl: 1  •  ezetimibe (ZETIA) 10 mg tablet, TAKE 1 TABLET BY MOUTH EVERY DAY, Disp: 30 tablet, Rfl: 5  •  finasteride (PROSCAR) 5 mg tablet, TAKE 1 TABLET BY MOUTH EVERY DAY, Disp: 90 tablet, Rfl: 3  •  fluticasone (FLONASE) 50 mcg/act nasal spray, 2 sprays into each nostril daily, Disp: 16 g, Rfl: 2  •  losartan (COZAAR) 50 mg tablet, Take 1 tablet (50 mg total) by mouth daily, Disp: 90 tablet, Rfl: 1  •  metoprolol succinate (TOPROL-XL) 100 mg 24 hr tablet, Take 1 tablet (100 mg total) by mouth daily, Disp: 90 tablet, Rfl: 1  •  tamsulosin (FLOMAX) 0 4 mg, TAKE 1 CAPSULE BY MOUTH EVERY DAY WITH DINNER, Disp: 90 capsule, Rfl: 3  •  buPROPion (WELLBUTRIN SR) 150 mg 12 hr tablet, Take 1 tablet (150 mg total) by mouth 2 (two) times a day (Patient not taking: Reported on 4/25/2023), Disp: 60 tablet, Rfl: 2        Review of Systems:  Review of Systems   Respiratory: Negative  Cardiovascular: Negative      All other systems reviewed and are negative  Physical Exam:  Physical Exam  Constitutional:       General: He is not in acute distress  Appearance: He is well-developed  He is not diaphoretic  HENT:      Head: Normocephalic and atraumatic  Eyes:      General: No scleral icterus  Right eye: No discharge  Pupils: Pupils are equal, round, and reactive to light  Neck:      Thyroid: No thyromegaly  Cardiovascular:      Rate and Rhythm: Normal rate and regular rhythm  Heart sounds: Normal heart sounds  No murmur heard  No friction rub  No gallop  Pulmonary:      Effort: Pulmonary effort is normal       Breath sounds: Normal breath sounds  Abdominal:      General: There is no distension  Tenderness: There is no abdominal tenderness  There is no guarding or rebound  Musculoskeletal:         General: Normal range of motion  Cervical back: Normal range of motion and neck supple  Skin:     General: Skin is warm and dry  Coloration: Skin is not pale  Findings: No erythema or rash  Neurological:      Mental Status: He is alert and oriented to person, place, and time  Coordination: Coordination normal    Psychiatric:         Behavior: Behavior normal          Thought Content: Thought content normal          Judgment: Judgment normal          This note was completed in part utilizing [employer] Direct Software  Grammatical errors, random word insertions, spelling mistakes, and incomplete sentences can be an occasional consequence of this system secondary to software limitations, ambient noise, and hardware issues  If you have any questions or concerns about the content, text, or information contained within the body of this dictation, please contact the provider for clarification

## 2023-05-05 DIAGNOSIS — I25.10 CORONARY ARTERY DISEASE INVOLVING NATIVE HEART: ICD-10-CM

## 2023-05-05 DIAGNOSIS — E78.5 DYSLIPIDEMIA: ICD-10-CM

## 2023-05-11 RX ORDER — EZETIMIBE 10 MG/1
10 TABLET ORAL DAILY
Qty: 90 TABLET | Refills: 2 | Status: SHIPPED | OUTPATIENT
Start: 2023-05-11

## 2023-05-12 DIAGNOSIS — N40.1 BENIGN PROSTATIC HYPERPLASIA WITH URINARY OBSTRUCTION: ICD-10-CM

## 2023-05-12 DIAGNOSIS — N13.8 BENIGN PROSTATIC HYPERPLASIA WITH URINARY OBSTRUCTION: ICD-10-CM

## 2023-05-12 RX ORDER — FINASTERIDE 5 MG/1
TABLET, FILM COATED ORAL
Qty: 90 TABLET | Refills: 3 | Status: SHIPPED | OUTPATIENT
Start: 2023-05-12

## 2023-05-17 DIAGNOSIS — R09.81 NASAL CONGESTION: ICD-10-CM

## 2023-05-18 RX ORDER — FLUTICASONE PROPIONATE 50 MCG
SPRAY, SUSPENSION (ML) NASAL
Qty: 48 ML | Refills: 1 | Status: SHIPPED | OUTPATIENT
Start: 2023-05-18

## 2023-06-01 ENCOUNTER — OFFICE VISIT (OUTPATIENT)
Dept: UROLOGY | Facility: CLINIC | Age: 78
End: 2023-06-01

## 2023-06-01 VITALS
HEIGHT: 69 IN | OXYGEN SATURATION: 97 % | DIASTOLIC BLOOD PRESSURE: 78 MMHG | BODY MASS INDEX: 24.14 KG/M2 | WEIGHT: 163 LBS | HEART RATE: 67 BPM | SYSTOLIC BLOOD PRESSURE: 122 MMHG

## 2023-06-01 DIAGNOSIS — N40.1 BENIGN PROSTATIC HYPERPLASIA WITH URINARY OBSTRUCTION: Primary | ICD-10-CM

## 2023-06-01 DIAGNOSIS — N13.8 BENIGN PROSTATIC HYPERPLASIA WITH URINARY OBSTRUCTION: Primary | ICD-10-CM

## 2023-06-01 LAB
POST-VOID RESIDUAL VOLUME, ML POC: 6 ML
SL AMB  POCT GLUCOSE, UA: NORMAL
SL AMB LEUKOCYTE ESTERASE,UA: NORMAL
SL AMB POCT BILIRUBIN,UA: NORMAL
SL AMB POCT BLOOD,UA: NORMAL
SL AMB POCT CLARITY,UA: CLEAR
SL AMB POCT COLOR,UA: YELLOW
SL AMB POCT KETONES,UA: NORMAL
SL AMB POCT NITRITE,UA: NORMAL
SL AMB POCT PH,UA: 7
SL AMB POCT SPECIFIC GRAVITY,UA: 1.01
SL AMB POCT URINE PROTEIN: NORMAL
SL AMB POCT UROBILINOGEN: 0.2

## 2023-06-01 NOTE — PROGRESS NOTES
"6/1/2023      No chief complaint on file  Assessment and Plan    69 y o  male managed by Dr Benito Campbell    1  BPH with obstruction    Elio Gallego is doing well no voiding complaints  No retention pvr 6ml  Taking flomax/proscar daily and will continue forever  Followup q2 yrs or sooner if new problems arise  History of Present Illness  Kristan Esposito is a 69 y o  male here for evaluation of annual BPH visit  History of retention on and off in prior years if/when missed his meds  Taking flomax/proscar daily now and feels great  PVR 6ml  Review of Systems   Constitutional: Negative  Respiratory: Negative  Cardiovascular: Negative  Genitourinary: Negative for decreased urine volume, difficulty urinating, dysuria, flank pain, frequency, hematuria and urgency  Musculoskeletal: Negative  AUA SYMPTOM SCORE    Flowsheet Row Most Recent Value   AUA SYMPTOM SCORE    How often have you had a sensation of not emptying your bladder completely after you finished urinating? 1   How often have you had to urinate again less than two hours after you finished urinating? 0   How often have you found you stopped and started again several times when you urinate? 0   How often have you found it difficult to postpone urination? 0   How often have you had a weak urinary stream? 0   How often have you had to push or strain to begin urination? 0   How many times did you most typically get up to urinate from the time you went to bed at night until the time you got up in the morning? 1   Quality of Life: If you were to spend the rest of your life with your urinary condition just the way it is now, how would you feel about that? 1   AUA SYMPTOM SCORE 2           Vitals  Vitals:    06/01/23 0920   BP: 122/78   BP Location: Left arm   Patient Position: Sitting   Cuff Size: Adult   Pulse: 67   SpO2: 97%   Weight: 73 9 kg (163 lb)   Height: 5' 9\" (1 753 m)       Physical Exam  Vitals and nursing note reviewed   " Constitutional:       General: He is not in acute distress  Appearance: He is well-developed  He is not diaphoretic  HENT:      Head: Normocephalic and atraumatic  Pulmonary:      Effort: Pulmonary effort is normal    Musculoskeletal:      Right lower leg: No edema  Left lower leg: No edema  Skin:     General: Skin is warm and dry  Neurological:      Mental Status: He is alert and oriented to person, place, and time  Gait: Gait normal    Psychiatric:         Speech: Speech normal          Behavior: Behavior normal            Past History  Past Medical History:   Diagnosis Date   • Coronary artery disease    • Full dentures     upper and lower   • GERD (gastroesophageal reflux disease)    • Hyperlipidemia    • Hypertension    • Inguinal hernia 02/2018    right side   • Myocardial infarction [facility])    • Pneumonia    • Sciatica    • Urinary retention 2/13/2019   • Wears glasses      Social History     Socioeconomic History   • Marital status: /Civil Union     Spouse name: None   • Number of children: None   • Years of education: None   • Highest education level: None   Occupational History   • None   Tobacco Use   • Smoking status: Every Day     Packs/day: 0 50     Years: 40 00     Total pack years: 20 00     Types: Cigarettes   • Smokeless tobacco: Never   Vaping Use   • Vaping Use: Never used   Substance and Sexual Activity   • Alcohol use:  Yes     Alcohol/week: 10 0 standard drinks of alcohol     Types: 10 Cans of beer per week     Comment: social    • Drug use: No   • Sexual activity: None   Other Topics Concern   • None   Social History Narrative   • None     Social Determinants of Health     Financial Resource Strain: Low Risk  (4/25/2023)    Overall Financial Resource Strain (CARDIA)    • Difficulty of Paying Living Expenses: Not hard at all   Food Insecurity: Not on file   Transportation Needs: No Transportation Needs (4/25/2023)    PRAPARE - Transportation    • Lack of Transportation (Medical): No    • Lack of Transportation (Non-Medical):  No   Physical Activity: Not on file   Stress: Not on file   Social Connections: Not on file   Intimate Partner Violence: Not on file   Housing Stability: Not on file     Social History     Tobacco Use   Smoking Status Every Day   • Packs/day: 0 50   • Years: 40 00   • Total pack years: 20 00   • Types: Cigarettes   Smokeless Tobacco Never     Family History   Problem Relation Age of Onset   • Gallbladder disease Mother        The following portions of the patient's history were reviewed and updated as appropriate: allergies, current medications, past medical history, past social history, past surgical history and problem list     Results  Recent Results (from the past 1 hour(s))   POCT urine dip    Collection Time: 06/01/23  9:24 AM   Result Value Ref Range    LEUKOCYTE ESTERASE,UA trace     NITRITE,UA -     SL AMB POCT UROBILINOGEN 0 2     POCT URINE PROTEIN trace      PH,UA 7 0     BLOOD,UA -     SPECIFIC GRAVITY,UA 1 010     KETONES,UA -     BILIRUBIN,UA -     GLUCOSE, UA -      COLOR,UA yellow     CLARITY,UA clear    POCT Measure PVR    Collection Time: 06/01/23  9:26 AM   Result Value Ref Range    POST-VOID RESIDUAL VOLUME, ML POC 6 mL   ]  Lab Results   Component Value Date    PSA 3 5 01/24/2018     Lab Results   Component Value Date    BUN 11 02/09/2019    CALCIUM 8 8 02/09/2019     02/09/2019    CO2 24 02/09/2019    CREATININE 0 79 02/09/2019    K 4 1 02/09/2019     Lab Results   Component Value Date    HCT 51 8 (H) 01/24/2018    HGB 17 9 (H) 01/24/2018    MCV 99 (H) 01/24/2018     01/24/2018    WBC 13 35 (H) 01/24/2018

## 2023-06-24 PROBLEM — Z00.00 MEDICARE ANNUAL WELLNESS VISIT, SUBSEQUENT: Status: RESOLVED | Noted: 2023-04-25 | Resolved: 2023-06-24

## 2023-09-15 DIAGNOSIS — E78.5 DYSLIPIDEMIA: ICD-10-CM

## 2023-09-15 DIAGNOSIS — I25.10 CORONARY ARTERY DISEASE INVOLVING NATIVE CORONARY ARTERY OF NATIVE HEART WITHOUT ANGINA PECTORIS: ICD-10-CM

## 2023-09-15 DIAGNOSIS — I10 HTN (HYPERTENSION), BENIGN: ICD-10-CM

## 2023-09-15 RX ORDER — LOSARTAN POTASSIUM 50 MG/1
50 TABLET ORAL DAILY
Qty: 30 TABLET | Refills: 5 | Status: SHIPPED | OUTPATIENT
Start: 2023-09-15

## 2023-09-15 RX ORDER — METOPROLOL SUCCINATE 100 MG/1
100 TABLET, EXTENDED RELEASE ORAL DAILY
Qty: 30 TABLET | Refills: 5 | Status: SHIPPED | OUTPATIENT
Start: 2023-09-15

## 2023-09-15 RX ORDER — ATORVASTATIN CALCIUM 80 MG/1
80 TABLET, FILM COATED ORAL DAILY
Qty: 90 TABLET | Refills: 1 | Status: SHIPPED | OUTPATIENT
Start: 2023-09-15

## 2023-10-17 ENCOUNTER — TELEPHONE (OUTPATIENT)
Dept: CARDIOLOGY CLINIC | Facility: CLINIC | Age: 78
End: 2023-10-17

## 2023-10-17 NOTE — TELEPHONE ENCOUNTER
Received fax from [employer] for 90 day supply of losartan and metoprolol. Attempted to contact patient, left message, advised we received request for  90 day supply of losartan and metoprolol. Not sure if patient now following with cardiology at [facility]. Advised to call us if he will continue to follow with Dr Mariela Francois and would like 90 day supply, if not advised to call [facility] cardiology.

## 2023-10-18 ENCOUNTER — RA CDI HCC (OUTPATIENT)
Dept: OTHER | Facility: HOSPITAL | Age: 78
End: 2023-10-18

## 2024-01-08 DIAGNOSIS — R33.9 URINARY RETENTION: ICD-10-CM

## 2024-01-08 RX ORDER — TAMSULOSIN HYDROCHLORIDE 0.4 MG/1
CAPSULE ORAL
Qty: 90 CAPSULE | Refills: 1 | Status: SHIPPED | OUTPATIENT
Start: 2024-01-08

## 2024-03-02 DIAGNOSIS — I25.10 CORONARY ARTERY DISEASE INVOLVING NATIVE HEART: ICD-10-CM

## 2024-03-02 DIAGNOSIS — E78.5 DYSLIPIDEMIA: ICD-10-CM

## 2024-03-04 DIAGNOSIS — I10 HTN (HYPERTENSION), BENIGN: ICD-10-CM

## 2024-03-04 DIAGNOSIS — I25.10 CORONARY ARTERY DISEASE INVOLVING NATIVE CORONARY ARTERY OF NATIVE HEART WITHOUT ANGINA PECTORIS: ICD-10-CM

## 2024-03-04 RX ORDER — METOPROLOL SUCCINATE 100 MG/1
100 TABLET, EXTENDED RELEASE ORAL DAILY
Qty: 90 TABLET | Refills: 1 | OUTPATIENT
Start: 2024-03-04

## 2024-03-04 RX ORDER — LOSARTAN POTASSIUM 50 MG/1
50 TABLET ORAL DAILY
Qty: 90 TABLET | Refills: 1 | OUTPATIENT
Start: 2024-03-04

## 2024-03-05 RX ORDER — EZETIMIBE 10 MG/1
10 TABLET ORAL DAILY
Qty: 90 TABLET | Refills: 2 | Status: SHIPPED | OUTPATIENT
Start: 2024-03-05

## 2024-04-05 DIAGNOSIS — I10 HTN (HYPERTENSION), BENIGN: ICD-10-CM

## 2024-04-05 RX ORDER — LOSARTAN POTASSIUM 50 MG/1
50 TABLET ORAL DAILY
Qty: 30 TABLET | Refills: 5 | Status: SHIPPED | OUTPATIENT
Start: 2024-04-05

## 2024-05-01 DIAGNOSIS — I10 HTN (HYPERTENSION), BENIGN: ICD-10-CM

## 2024-05-01 RX ORDER — LOSARTAN POTASSIUM 50 MG/1
50 TABLET ORAL DAILY
Qty: 90 TABLET | Refills: 3 | Status: SHIPPED | OUTPATIENT
Start: 2024-05-01

## 2024-05-16 ENCOUNTER — TELEPHONE (OUTPATIENT)
Dept: FAMILY MEDICINE CLINIC | Facility: CLINIC | Age: 79
End: 2024-05-16

## 2024-05-24 DIAGNOSIS — N40.1 BENIGN PROSTATIC HYPERPLASIA WITH URINARY OBSTRUCTION: ICD-10-CM

## 2024-05-24 DIAGNOSIS — N13.8 BENIGN PROSTATIC HYPERPLASIA WITH URINARY OBSTRUCTION: ICD-10-CM

## 2024-05-24 RX ORDER — FINASTERIDE 5 MG/1
TABLET, FILM COATED ORAL
Qty: 90 TABLET | Refills: 1 | Status: SHIPPED | OUTPATIENT
Start: 2024-05-24

## 2024-07-01 ENCOUNTER — TRANSITIONAL CARE MANAGEMENT (OUTPATIENT)
Dept: FAMILY MEDICINE CLINIC | Facility: CLINIC | Age: 79
End: 2024-07-01

## 2024-08-08 DIAGNOSIS — R33.9 URINARY RETENTION: ICD-10-CM

## 2024-08-09 RX ORDER — TAMSULOSIN HYDROCHLORIDE 0.4 MG/1
CAPSULE ORAL
Qty: 90 CAPSULE | Refills: 0 | Status: SHIPPED | OUTPATIENT
Start: 2024-08-09

## 2024-11-29 ENCOUNTER — TELEPHONE (OUTPATIENT)
Dept: FAMILY MEDICINE CLINIC | Facility: CLINIC | Age: 79
End: 2024-11-29

## 2024-12-14 DIAGNOSIS — N40.1 BENIGN PROSTATIC HYPERPLASIA WITH URINARY OBSTRUCTION: ICD-10-CM

## 2024-12-14 DIAGNOSIS — N13.8 BENIGN PROSTATIC HYPERPLASIA WITH URINARY OBSTRUCTION: ICD-10-CM

## 2024-12-17 RX ORDER — FINASTERIDE 5 MG/1
5 TABLET, FILM COATED ORAL DAILY
Qty: 90 TABLET | Refills: 1 | Status: SHIPPED | OUTPATIENT
Start: 2024-12-17

## 2024-12-26 ENCOUNTER — OFFICE VISIT (OUTPATIENT)
Dept: FAMILY MEDICINE CLINIC | Facility: CLINIC | Age: 79
End: 2024-12-26
Payer: MEDICARE

## 2024-12-26 VITALS
TEMPERATURE: 99.5 F | HEART RATE: 80 BPM | WEIGHT: 132.2 LBS | SYSTOLIC BLOOD PRESSURE: 150 MMHG | DIASTOLIC BLOOD PRESSURE: 90 MMHG | BODY MASS INDEX: 19.52 KG/M2 | OXYGEN SATURATION: 96 % | RESPIRATION RATE: 16 BRPM

## 2024-12-26 DIAGNOSIS — J44.89 ASTHMA-COPD OVERLAP SYNDROME (HCC): Primary | ICD-10-CM

## 2024-12-26 DIAGNOSIS — C91.10 CLL (CHRONIC LYMPHOCYTIC LEUKEMIA) (HCC): ICD-10-CM

## 2024-12-26 DIAGNOSIS — D69.6 PLATELETS DECREASED (HCC): ICD-10-CM

## 2024-12-26 DIAGNOSIS — R05.1 ACUTE COUGH: ICD-10-CM

## 2024-12-26 DIAGNOSIS — I73.9 PERIPHERAL ARTERIAL DISEASE (HCC): ICD-10-CM

## 2024-12-26 LAB
SARS-COV-2 AG UPPER RESP QL IA: NEGATIVE
SL AMB POCT RAPID FLU A: NORMAL
SL AMB POCT RAPID FLU B: NORMAL
VALID CONTROL: NORMAL

## 2024-12-26 PROCEDURE — 87804 INFLUENZA ASSAY W/OPTIC: CPT | Performed by: FAMILY MEDICINE

## 2024-12-26 PROCEDURE — 99214 OFFICE O/P EST MOD 30 MIN: CPT | Performed by: FAMILY MEDICINE

## 2024-12-26 PROCEDURE — G2211 COMPLEX E/M VISIT ADD ON: HCPCS | Performed by: FAMILY MEDICINE

## 2024-12-26 PROCEDURE — 87811 SARS-COV-2 COVID19 W/OPTIC: CPT | Performed by: FAMILY MEDICINE

## 2024-12-26 RX ORDER — DOXYCYCLINE 100 MG/1
100 CAPSULE ORAL EVERY 12 HOURS SCHEDULED
Qty: 14 CAPSULE | Refills: 0 | Status: SHIPPED | OUTPATIENT
Start: 2024-12-26 | End: 2025-01-02

## 2024-12-26 NOTE — ASSESSMENT & PLAN NOTE
Woresning cough over 3 weeks, liekly exarcenbation of ephysema, start treatment with doxycline obtain c

## 2024-12-26 NOTE — PROGRESS NOTES
Name: Edgard Medellin      : 1945      MRN: [id number]  Encounter Provider: Maurisio Gonzales MD  Encounter Date: 2024   Encounter department: [facility] PRIMARY [facility]  :  Assessment & Plan  Acute cough    Worsening cough over 3 weeks, liekly exacerbation of emphysema, start treatment with doxycyline obtain cxr  Orders:    POCT Rapid Covid Ag    POCT rapid flu A and B    doxycycline hyclate (VIBRAMYCIN) 100 mg capsule; Take 1 capsule (100 mg total) by mouth every 12 (twelve) hours for 7 days    XR chest pa and lateral; Future    CLL (chronic lymphocytic leukemia) (HCC)    Continues in follow up with oncology       Asthma-COPD overlap syndrome (HCC)    Woresning cough over 3 weeks, liekly exarcenbation of ephysema, start treatment with doxycline obtain c       Platelets decreased (HCC)      Continues in follow up with oncology       Peripheral arterial disease (HCC)    Remains on aspirin             Depression Screening and Follow-up Plan: Patient was screened for depression during today's encounter. They screened negative with a PHQ-2 score of 0.    Tobacco Cessation Counseling: Tobacco cessation counseling was provided. The patient is sincerely urged to quit consumption of tobacco. He is not ready to quit tobacco.       History of Present Illness     HPI    79 year old with pmh of COPD, CLL presenting for 3 weeks of productive cough.    At first with clear mucous now with white.    Reports no shortness of breath or fevers.    No sick contacts.    Review of Systems   Constitutional:  Negative for activity change and appetite change.   Respiratory:  Positive for cough and wheezing. Negative for apnea, chest tightness and shortness of breath.    Cardiovascular:  Negative for chest pain and palpitations.   Gastrointestinal:  Negative for abdominal distention and abdominal pain.   Musculoskeletal:  Negative for arthralgias and back pain.       Objective   /90 (BP Location:  Left arm, Patient Position: Sitting, Cuff Size: Standard)   Pulse 80   Temp 99.5 °F (37.5 °C)   Resp 16   Wt 60 kg (132 lb 3.2 oz)   SpO2 96%   BMI 19.52 kg/m²      Physical Exam  Constitutional:       Appearance: Normal appearance.   Cardiovascular:      Rate and Rhythm: Normal rate and regular rhythm.      Pulses: Normal pulses.      Heart sounds: Normal heart sounds.   Pulmonary:      Effort: Pulmonary effort is normal.      Breath sounds: No wheezing or rhonchi.   Musculoskeletal:         General: Normal range of motion.   Neurological:      Mental Status: He is alert.

## 2025-03-22 DIAGNOSIS — R33.9 URINARY RETENTION: ICD-10-CM

## 2025-03-24 RX ORDER — TAMSULOSIN HYDROCHLORIDE 0.4 MG/1
0.4 CAPSULE ORAL
Qty: 90 CAPSULE | Refills: 0 | Status: SHIPPED | OUTPATIENT
Start: 2025-03-24

## 2025-05-14 ENCOUNTER — TRANSITIONAL CARE MANAGEMENT (OUTPATIENT)
Dept: FAMILY MEDICINE CLINIC | Facility: CLINIC | Age: 80
End: 2025-05-14

## 2025-06-18 DIAGNOSIS — R33.9 URINARY RETENTION: ICD-10-CM

## 2025-06-18 RX ORDER — TAMSULOSIN HYDROCHLORIDE 0.4 MG/1
0.4 CAPSULE ORAL
Qty: 90 CAPSULE | Refills: 1 | Status: SHIPPED | OUTPATIENT
Start: 2025-06-18

## 2025-07-02 ENCOUNTER — TELEPHONE (OUTPATIENT)
Dept: FAMILY MEDICINE CLINIC | Facility: CLINIC | Age: 80
End: 2025-07-02

## 2025-07-07 ENCOUNTER — TRANSITIONAL CARE MANAGEMENT (OUTPATIENT)
Dept: FAMILY MEDICINE CLINIC | Facility: CLINIC | Age: 80
End: 2025-07-07

## 2025-07-09 ENCOUNTER — TELEPHONE (OUTPATIENT)
Dept: FAMILY MEDICINE CLINIC | Facility: CLINIC | Age: 80
End: 2025-07-09

## 2025-07-21 DIAGNOSIS — N13.8 BENIGN PROSTATIC HYPERPLASIA WITH URINARY OBSTRUCTION: ICD-10-CM

## 2025-07-21 DIAGNOSIS — N40.1 BENIGN PROSTATIC HYPERPLASIA WITH URINARY OBSTRUCTION: ICD-10-CM

## 2025-07-22 RX ORDER — FINASTERIDE 5 MG/1
5 TABLET, FILM COATED ORAL DAILY
Qty: 90 TABLET | Refills: 3 | Status: SHIPPED | OUTPATIENT
Start: 2025-07-22

## (undated) DEVICE — GLOVE INDICATOR PI UNDERGLOVE SZ 8 BLUE

## (undated) DEVICE — [HIGH FLOW INSUFFLATOR,  DO NOT USE IF PACKAGE IS DAMAGED,  KEEP DRY,  KEEP AWAY FROM SUNLIGHT,  PROTECT FROM HEAT AND RADIOACTIVE SOURCES.]: Brand: PNEUMOSURE

## (undated) DEVICE — SCD SEQUENTIAL COMPRESSION COMFORT SLEEVE MEDIUM KNEE LENGTH: Brand: KENDALL SCD

## (undated) DEVICE — PROGRASP FORCEPS: Brand: ENDOWRIST;DAVINCI SI

## (undated) DEVICE — PERMANENT CAUTERY HOOK: Brand: ENDOWRIST;DAVINCI SI

## (undated) DEVICE — ALLENTOWN LAP CHOLE APP PACK: Brand: CARDINAL HEALTH

## (undated) DEVICE — ASTOUND STANDARD SURGICAL GOWN, XL: Brand: CONVERTORS

## (undated) DEVICE — ADHESIVE SKN CLSR HISTOACRYL FLEX 0.5ML LF

## (undated) DEVICE — MEGASUTURECUT ND: Brand: ENDOWRIST;DAVINCI SI

## (undated) DEVICE — 3M™ IOBAN™ 2 ANTIMICROBIAL INCISE DRAPE 6648EZ: Brand: IOBAN™ 2

## (undated) DEVICE — GLOVE SRG BIOGEL 6.5

## (undated) DEVICE — CHLORAPREP HI-LITE 26ML ORANGE

## (undated) DEVICE — GLOVE INDICATOR PI UNDERGLOVE SZ 6.5 BLUE

## (undated) DEVICE — SUT VICRYL 0 UR-6 27 IN J603H

## (undated) DEVICE — SUT MONOCRYL 4-0 PS-2 27 IN Y426H

## (undated) DEVICE — TRAY FOLEY 16FR URIMETER SURESTEP

## (undated) DEVICE — INTENDED FOR TISSUE SEPARATION, AND OTHER PROCEDURES THAT REQUIRE A SHARP SURGICAL BLADE TO PUNCTURE OR CUT.: Brand: BARD-PARKER SAFETY BLADES SIZE 11, STERILE

## (undated) DEVICE — SUT VLOC 90 3-0 V-20 9IN VLOCM0644

## (undated) DEVICE — ROBOT ACCESSORY KIT 4 ARM

## (undated) DEVICE — DRAPE SHEET X-LG

## (undated) DEVICE — ROBOT INST CANNULA 8MM OBTURATOR BLUNT DISP

## (undated) DEVICE — GLOVE SRG BIOGEL ECLIPSE 7.5

## (undated) DEVICE — DRAPE FLUID WARMER (BIRD BATH)

## (undated) DEVICE — SUT SILK 2-0 SH 30 IN K833H